# Patient Record
Sex: FEMALE | Race: BLACK OR AFRICAN AMERICAN | Employment: OTHER | URBAN - METROPOLITAN AREA
[De-identification: names, ages, dates, MRNs, and addresses within clinical notes are randomized per-mention and may not be internally consistent; named-entity substitution may affect disease eponyms.]

---

## 2017-03-29 ENCOUNTER — OFFICE VISIT (OUTPATIENT)
Dept: ENT CLINIC | Age: 42
End: 2017-03-29

## 2017-03-29 ENCOUNTER — TELEPHONE (OUTPATIENT)
Dept: ENT CLINIC | Age: 42
End: 2017-03-29

## 2017-03-29 VITALS
HEIGHT: 63 IN | SYSTOLIC BLOOD PRESSURE: 100 MMHG | BODY MASS INDEX: 23.92 KG/M2 | DIASTOLIC BLOOD PRESSURE: 60 MMHG | WEIGHT: 135 LBS | HEART RATE: 62 BPM

## 2017-03-29 DIAGNOSIS — H60.332 ACUTE SWIMMER'S EAR OF LEFT SIDE: Primary | ICD-10-CM

## 2017-03-29 PROCEDURE — 99203 OFFICE O/P NEW LOW 30 MIN: CPT | Performed by: OTOLARYNGOLOGY

## 2018-10-13 ENCOUNTER — HOSPITAL ENCOUNTER (EMERGENCY)
Age: 43
Discharge: HOME | End: 2018-10-13
Payer: COMMERCIAL

## 2018-10-13 VITALS
HEIGHT: 63 IN | RESPIRATION RATE: 14 BRPM | DIASTOLIC BLOOD PRESSURE: 67 MMHG | TEMPERATURE: 97.2 F | OXYGEN SATURATION: 100 % | SYSTOLIC BLOOD PRESSURE: 103 MMHG | WEIGHT: 150 LBS | BODY MASS INDEX: 26.58 KG/M2 | HEART RATE: 59 BPM

## 2018-10-13 DIAGNOSIS — H92.02 OTALGIA OF LEFT EAR: Primary | ICD-10-CM

## 2018-10-13 PROCEDURE — 99282 EMERGENCY DEPT VISIT SF MDM: CPT

## 2018-10-13 RX ORDER — CIPROFLOXACIN AND DEXAMETHASONE 3; 1 MG/ML; MG/ML
4 SUSPENSION/ DROPS AURICULAR (OTIC) 2 TIMES DAILY
Qty: 1 BOTTLE | Refills: 0 | Status: SHIPPED | OUTPATIENT
Start: 2018-10-13 | End: 2018-10-20

## 2018-10-13 RX ORDER — IBUPROFEN 600 MG/1
600 TABLET ORAL EVERY 6 HOURS PRN
Qty: 30 TABLET | Refills: 0 | Status: SHIPPED | OUTPATIENT
Start: 2018-10-13 | End: 2020-12-12

## 2018-10-13 ASSESSMENT — PAIN DESCRIPTION - PAIN TYPE: TYPE: ACUTE PAIN

## 2018-10-13 ASSESSMENT — ENCOUNTER SYMPTOMS
VOMITING: 0
TROUBLE SWALLOWING: 0
COUGH: 0
NAUSEA: 0
SHORTNESS OF BREATH: 0
COLOR CHANGE: 0

## 2018-10-13 ASSESSMENT — PAIN DESCRIPTION - LOCATION: LOCATION: EAR

## 2018-10-13 ASSESSMENT — PAIN DESCRIPTION - ORIENTATION: ORIENTATION: LEFT

## 2018-10-13 ASSESSMENT — PAIN SCALES - GENERAL: PAINLEVEL_OUTOF10: 7

## 2018-10-13 NOTE — ED PROVIDER NOTES
No results found. MEDICAL DECISION MAKING / ED COURSE:      PROCEDURES:   Procedures    None    Patient was given:  Medications - No data to display    Patient presented with some otalgia the left ear. There is no evidence of otitis externa, malignant otitis externa, otitis media, mastoiditis, [place], cellulitis or other emergent etiology. Patient was adamant that she needed antibiotic drops. I had a long conversation with the patient that there is currently no evidence of infectious etiology and that if we put her on antibiotics can lead to resistance. Using shared decision making this patient was extremely adamant a discussed with her that would write her for a prescription but to only take the anti-inflammatories first and only get the drops filled if the ear pain does not improve or gets worse. Patient understood. She will follow-up with her family physician return here for any worsening symptoms or problems at home. The patient tolerated their visit well. I evaluated the patient. The physician was available for consultation as needed. The patient and / or the family were informed of the results of anytests, a time was given to answer questions, a plan was proposed and they agreed with plan. CLINICAL IMPRESSION:  1.  Otalgia of left ear        DISPOSITION Decision To Discharge 10/13/2018 07:37:55 PM      PATIENT REFERRED TO:  Clem Quintanilla MD  [address]   [phone]    Schedule an appointment as soon as possible for a visit in 3 days  For re-check    [facility] Emergency Department  [address] [place] [address]  [phone]    As needed      DISCHARGE MEDICATIONS:  New Prescriptions    CIPROFLOXACIN-DEXAMETHASONE (CIPRODEX) 0.3-0.1 % OTIC SUSPENSION    Place 4 drops in ear(s) 2 times daily for 7 days    IBUPROFEN (ADVIL;MOTRIN) 600 MG TABLET    Take 1 tablet by mouth every 6 hours as needed for Pain       DISCONTINUED MEDICATIONS:  Discontinued Medications    MOMETASONE (NASONEX) 50 MCG/ACT NASAL SPRAY    2 sprays by Nasal route daily    NAPROXEN (NAPROSYN) 500 MG TABLET    Take 1 tablet by mouth 2 times daily (with meals)              (Please note the MDM and HPI sections of this note were completed with a voice recognition program.  Efforts weremade to edit the dictations but occasionally words are mis-transcribed.)    Electronically signed, Zoltan Navarro PA-C,          Zoltan Navarro PA-C  10/13/18 1948

## 2018-10-20 ENCOUNTER — HOSPITAL ENCOUNTER (EMERGENCY)
Age: 43
Discharge: HOME | End: 2018-10-20
Payer: COMMERCIAL

## 2018-10-20 ENCOUNTER — APPOINTMENT (OUTPATIENT)
Dept: GENERAL RADIOLOGY | Age: 43
End: 2018-10-20
Payer: COMMERCIAL

## 2018-10-20 VITALS
RESPIRATION RATE: 18 BRPM | SYSTOLIC BLOOD PRESSURE: 103 MMHG | TEMPERATURE: 97.9 F | HEIGHT: 63 IN | HEART RATE: 70 BPM | WEIGHT: 155 LBS | OXYGEN SATURATION: 94 % | BODY MASS INDEX: 27.46 KG/M2 | DIASTOLIC BLOOD PRESSURE: 69 MMHG

## 2018-10-20 DIAGNOSIS — T59.811A SMOKE INHALATION DUE TO CHEMICAL FUMES AND VAPORS: Primary | ICD-10-CM

## 2018-10-20 DIAGNOSIS — Z57.9 OCCUPATIONAL EXPOSURE IN WORKPLACE: ICD-10-CM

## 2018-10-20 PROCEDURE — 71046 X-RAY EXAM CHEST 2 VIEWS: CPT

## 2018-10-20 PROCEDURE — 99284 EMERGENCY DEPT VISIT MOD MDM: CPT

## 2018-10-20 RX ORDER — PREDNISONE 10 MG/1
60 TABLET ORAL DAILY
Qty: 30 TABLET | Refills: 0 | Status: SHIPPED | OUTPATIENT
Start: 2018-10-20 | End: 2018-10-25

## 2018-10-20 RX ORDER — AMOXICILLIN AND CLAVULANATE POTASSIUM 875; 125 MG/1; MG/1
1 TABLET, FILM COATED ORAL 2 TIMES DAILY
Qty: 20 TABLET | Refills: 0 | Status: SHIPPED | OUTPATIENT
Start: 2018-10-20 | End: 2018-10-30

## 2018-10-20 SDOH — HEALTH STABILITY - PHYSICAL HEALTH: OCCUPATIONAL EXPOSURE TO UNSPECIFIED RISK FACTOR: Z57.9

## 2018-10-20 ASSESSMENT — ENCOUNTER SYMPTOMS
SORE THROAT: 0
NAUSEA: 0
VOMITING: 0
RHINORRHEA: 0
CONSTIPATION: 0
ABDOMINAL PAIN: 0
DIARRHEA: 0
BLOOD IN STOOL: 0
SHORTNESS OF BREATH: 1

## 2018-10-20 ASSESSMENT — PAIN SCALES - GENERAL: PAINLEVEL_OUTOF10: 6

## 2018-10-20 ASSESSMENT — PAIN DESCRIPTION - LOCATION: LOCATION: CHEST

## 2018-10-20 ASSESSMENT — PAIN DESCRIPTION - PAIN TYPE: TYPE: ACUTE PAIN

## 2018-10-20 NOTE — ED PROVIDER NOTES
[address] [facility]  eMERGENCY dEPARTMENT eNCOUnter        Pt Name: Princess Smith  MRN: [id number]  [place] 1975  Date of evaluation: 10/20/2018  Provider: MARISOL Kirby - VESNA  PCP: Mallorie Katz MD      [address] [place] [address]       Chief Complaint   Patient presents with    Shortness of Breath     Pt. comes in today with complaints of shortness of breath that has been going on for a month. Pt. [place] fumes. Pt. would like a note for work, and a prescription for an antibiotic for bronchitis. HISTORY OF PRESENT ILLNESS   (Location/Symptom, Timing/Onset,Context/Setting, Quality, Duration, Modifying Factors, Severity)  Note limiting factors. Princess Smith is a 37 y.o. female who presents emergency Department with concern for shortness of breath. 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 She has been inhaling jet fumes for about the past month. She reports that she thinks this is making her short of breath. She would like a work note to transfer jobs. She reports that she left work today to come here and get the note. She is also requesting antibiotics for bronchitis. She is not a smoker. She denies fever, rash, headaches, dizziness, chest pain, cough, congestion, abdominal pain, nausea, vomiting, diarrhea, constipation, blood in the stool, or painful urination. No family at bedside. Nursing Notes triage note reviewed and agreed with or any disagreements were addressed  in the HPI. REVIEW OF SYSTEMS    (2-9 systems for level 4, 10 or more for level 5)     Review of Systems   Constitutional: Negative for chills and fever. HENT: Negative for postnasal drip, rhinorrhea and sore throat. Eyes: Negative for visual disturbance. Respiratory: Positive for shortness of breath. Cardiovascular: Negative for chest pain.    Gastrointestinal: Negative for abdominal pain, blood in stool, constipation, diarrhea, nausea and vomiting. Genitourinary: Negative for dysuria, flank pain and hematuria. Skin: Negative for rash. Neurological: Negative for weakness and headaches. All other systems reviewed and are negative. Positives and Pertinent negatives as per HPI. Except as noted above in the ROS, all other systems were reviewed and negative. PAST MEDICAL HISTORY     Past Medical History:   Diagnosis Date    Bartholin's cyst     Bell's palsy     Necrotizing pancreatitis          SURGICAL HISTORY       Past Surgical History:   Procedure Laterality Date    BARTHOLIN GLAND CYST EXCISION       SECTION           CURRENT MEDICATIONS       Discharge Medication List as of 10/20/2018 12:09 PM      CONTINUE these medications which have NOT CHANGED    Details   ibuprofen (ADVIL;MOTRIN) 600 MG tablet Take 1 tablet by mouth every 6 hours as needed for Pain, Disp-30 tablet, R-0Print      ciprofloxacin-dexamethasone (CIPRODEX) 0.3-0.1 % otic suspension Place 4 drops in ear(s) 2 times daily for 7 days, Disp-1 Bottle, R-0Print               ALLERGIES     Shellfish-derived products    FAMILY HISTORY     History reviewed. No pertinent family history.        SOCIAL HISTORY       Social History     Social History    Marital status: Single     Spouse name: N/A    Number of children: N/A    Years of education: N/A     Social History Main Topics    Smoking status: Former Smoker     Quit date: 2007    Smokeless tobacco: Never Used    Alcohol use No    Drug use: No    Sexual activity: No     Other Topics Concern    None     Social History Narrative    None       SCREENINGS             PHYSICAL EXAM  (up to 7 for level 4, 8 or more for level 5)     ED Triage Vitals [10/20/18 1052]   BP Temp Temp Source Pulse Resp SpO2 Height Weight   (!) 109/43 97.9 °F (36.6 °C) Oral 60 18 100 % 5' 3\" (1.6 m) 155 lb (70.3 kg)       Physical Exam   Constitutional: She is oriented to person, place, and time. She appears well-developed and well-nourished. No distress. HENT:   Head: Normocephalic and atraumatic. Eyes: Right eye exhibits no discharge. Left eye exhibits no discharge. No scleral icterus. Neck: Normal range of motion. Neck supple. Cardiovascular: Normal rate, regular rhythm, normal heart sounds and intact distal pulses. Exam reveals no gallop and no friction rub. No murmur heard. Pulmonary/Chest: Effort normal and breath sounds normal. No stridor. No respiratory distress. She has no wheezes. She has no rales. She exhibits no tenderness. Abdominal: Soft. Bowel sounds are normal. She exhibits no distension and no mass. There is no tenderness. There is no rebound and no guarding. Musculoskeletal: Normal range of motion. She exhibits no edema or tenderness. Neurological: She is alert and oriented to person, place, and time. Coordination normal.   Skin: Skin is warm and dry. She is not diaphoretic. No pallor. Psychiatric: She has a normal mood and affect. Her behavior is normal.   Nursing note and vitals reviewed. DIAGNOSTIC RESULTS   LABS:    Labs Reviewed - No data to display    All other labs werewithin normal range or not returned as of this dictation. EKG: All EKG's are interpreted by the Emergency Department Physician who either signs or Co-signs this chart in the absence of acardiologist.  Please see their note for interpretation of EKG. RADIOLOGY:   Interpretation per the Radiologist below, if available at the time of this note:    XR CHEST STANDARD (2 VW)   Final Result   No acute cardiopulmonary disease. Xr Chest Standard (2 Vw)    Result Date: 10/20/2018  EXAMINATION: TWO VIEWS OF THE CHEST 10/20/2018 11:34 am COMPARISON: Radiograph 08/23/2018 HISTORY: ORDERING SYSTEM PROVIDED HISTORY: shortness of breath TECHNOLOGIST PROVIDED HISTORY: Reason for exam:->shortness of breath Ordering Physician Provided Reason for Exam: SOB x1 month.

## 2018-11-24 ENCOUNTER — APPOINTMENT (OUTPATIENT)
Dept: CT IMAGING | Age: 43
End: 2018-11-24
Payer: COMMERCIAL

## 2018-11-24 ENCOUNTER — HOSPITAL ENCOUNTER (EMERGENCY)
Age: 43
Discharge: HOME | End: 2018-11-24
Attending: EMERGENCY MEDICINE
Payer: COMMERCIAL

## 2018-11-24 VITALS
OXYGEN SATURATION: 98 % | DIASTOLIC BLOOD PRESSURE: 52 MMHG | SYSTOLIC BLOOD PRESSURE: 100 MMHG | RESPIRATION RATE: 16 BRPM | BODY MASS INDEX: 27.46 KG/M2 | HEART RATE: 60 BPM | WEIGHT: 155 LBS | TEMPERATURE: 98.1 F | HEIGHT: 63 IN

## 2018-11-24 DIAGNOSIS — R51.9 NONINTRACTABLE HEADACHE, UNSPECIFIED CHRONICITY PATTERN, UNSPECIFIED HEADACHE TYPE: Primary | ICD-10-CM

## 2018-11-24 LAB
A/G RATIO: 1.4 (ref 1.1–2.2)
ALBUMIN SERPL-MCNC: 3.8 G/DL (ref 3.4–5)
ALP BLD-CCNC: 67 U/L (ref 40–129)
ALT SERPL-CCNC: 22 U/L (ref 10–40)
ANION GAP SERPL CALCULATED.3IONS-SCNC: 12 MMOL/L (ref 3–16)
AST SERPL-CCNC: 27 U/L (ref 15–37)
BASOPHILS ABSOLUTE: 0 K/UL (ref 0–0.2)
BASOPHILS RELATIVE PERCENT: 0.4 %
BILIRUB SERPL-MCNC: 0.4 MG/DL (ref 0–1)
BUN BLDV-MCNC: 15 MG/DL (ref 7–20)
CALCIUM SERPL-MCNC: 8.6 MG/DL (ref 8.3–10.6)
CHLORIDE BLD-SCNC: 107 MMOL/L (ref 99–110)
CO2: 22 MMOL/L (ref 21–32)
CREAT SERPL-MCNC: 0.8 MG/DL (ref 0.6–1.1)
EOSINOPHILS ABSOLUTE: 0.1 K/UL (ref 0–0.6)
EOSINOPHILS RELATIVE PERCENT: 2.8 %
GFR AFRICAN AMERICAN: >60
GFR NON-AFRICAN AMERICAN: >60
GLOBULIN: 2.7 G/DL
GLUCOSE BLD-MCNC: 112 MG/DL (ref 70–99)
HCG QUALITATIVE: NEGATIVE
HCT VFR BLD CALC: 39.9 % (ref 36–48)
HEMOGLOBIN: 12.8 G/DL (ref 12–16)
LIPASE: 40 U/L (ref 13–60)
LYMPHOCYTES ABSOLUTE: 0.7 K/UL (ref 1–5.1)
LYMPHOCYTES RELATIVE PERCENT: 14.5 %
MCH RBC QN AUTO: 27.8 PG (ref 26–34)
MCHC RBC AUTO-ENTMCNC: 32.2 G/DL (ref 31–36)
MCV RBC AUTO: 86.3 FL (ref 80–100)
MONOCYTES ABSOLUTE: 0.3 K/UL (ref 0–1.3)
MONOCYTES RELATIVE PERCENT: 6.2 %
NEUTROPHILS ABSOLUTE: 3.9 K/UL (ref 1.7–7.7)
NEUTROPHILS RELATIVE PERCENT: 76.1 %
PDW BLD-RTO: 13.8 % (ref 12.4–15.4)
PLATELET # BLD: 173 K/UL (ref 135–450)
PMV BLD AUTO: 10.2 FL (ref 5–10.5)
POTASSIUM SERPL-SCNC: 4.4 MMOL/L (ref 3.5–5.1)
RBC # BLD: 4.62 M/UL (ref 4–5.2)
SODIUM BLD-SCNC: 141 MMOL/L (ref 136–145)
TOTAL PROTEIN: 6.5 G/DL (ref 6.4–8.2)
WBC # BLD: 5.1 K/UL (ref 4–11)

## 2018-11-24 PROCEDURE — 6360000002 HC RX W HCPCS: Performed by: PHYSICIAN ASSISTANT

## 2018-11-24 PROCEDURE — 70450 CT HEAD/BRAIN W/O DYE: CPT

## 2018-11-24 PROCEDURE — 96375 TX/PRO/DX INJ NEW DRUG ADDON: CPT

## 2018-11-24 PROCEDURE — 83690 ASSAY OF LIPASE: CPT

## 2018-11-24 PROCEDURE — 99284 EMERGENCY DEPT VISIT MOD MDM: CPT

## 2018-11-24 PROCEDURE — 85025 COMPLETE CBC W/AUTO DIFF WBC: CPT

## 2018-11-24 PROCEDURE — 36415 COLL VENOUS BLD VENIPUNCTURE: CPT

## 2018-11-24 PROCEDURE — 80053 COMPREHEN METABOLIC PANEL: CPT

## 2018-11-24 PROCEDURE — 96374 THER/PROPH/DIAG INJ IV PUSH: CPT

## 2018-11-24 PROCEDURE — 96361 HYDRATE IV INFUSION ADD-ON: CPT

## 2018-11-24 PROCEDURE — 6370000000 HC RX 637 (ALT 250 FOR IP): Performed by: PHYSICIAN ASSISTANT

## 2018-11-24 PROCEDURE — 2580000003 HC RX 258: Performed by: PHYSICIAN ASSISTANT

## 2018-11-24 PROCEDURE — 84703 CHORIONIC GONADOTROPIN ASSAY: CPT

## 2018-11-24 RX ORDER — METOCLOPRAMIDE HYDROCHLORIDE 5 MG/ML
10 INJECTION INTRAMUSCULAR; INTRAVENOUS ONCE
Status: COMPLETED | OUTPATIENT
Start: 2018-11-24 | End: 2018-11-24

## 2018-11-24 RX ORDER — ACETAMINOPHEN 500 MG
1000 TABLET ORAL ONCE
Status: COMPLETED | OUTPATIENT
Start: 2018-11-24 | End: 2018-11-24

## 2018-11-24 RX ORDER — DIPHENHYDRAMINE HYDROCHLORIDE 50 MG/ML
12.5 INJECTION INTRAMUSCULAR; INTRAVENOUS ONCE
Status: COMPLETED | OUTPATIENT
Start: 2018-11-24 | End: 2018-11-24

## 2018-11-24 RX ORDER — NAPROXEN 500 MG/1
500 TABLET ORAL 2 TIMES DAILY WITH MEALS
COMMUNITY
End: 2020-12-12

## 2018-11-24 RX ORDER — 0.9 % SODIUM CHLORIDE 0.9 %
1000 INTRAVENOUS SOLUTION INTRAVENOUS ONCE
Status: COMPLETED | OUTPATIENT
Start: 2018-11-24 | End: 2018-11-24

## 2018-11-24 RX ADMIN — SODIUM CHLORIDE 1000 ML: 9 INJECTION, SOLUTION INTRAVENOUS at 07:29

## 2018-11-24 RX ADMIN — METOCLOPRAMIDE 10 MG: 5 INJECTION, SOLUTION INTRAMUSCULAR; INTRAVENOUS at 07:30

## 2018-11-24 RX ADMIN — DIPHENHYDRAMINE HYDROCHLORIDE 12.5 MG: 50 INJECTION, SOLUTION INTRAMUSCULAR; INTRAVENOUS at 07:30

## 2018-11-24 RX ADMIN — ACETAMINOPHEN 1000 MG: 500 TABLET, FILM COATED ORAL at 07:29

## 2018-11-24 ASSESSMENT — ENCOUNTER SYMPTOMS
COUGH: 0
VOMITING: 0
NAUSEA: 0
SHORTNESS OF BREATH: 0
RHINORRHEA: 0
DIARRHEA: 0
ABDOMINAL PAIN: 0

## 2018-11-24 ASSESSMENT — PAIN SCALES - GENERAL
PAINLEVEL_OUTOF10: 7
PAINLEVEL_OUTOF10: 7

## 2018-11-24 NOTE — ED PROVIDER NOTES
[address] [facility]  eMERGENCY dEPARTMENT eNCOUnter        Pt Name: Margarita Monahan  MRN: [id number]  [place] 1975  Date of evaluation: 11/24/2018  Provider: Ramon Rosales PA-C  PCP: Talib Turcios MD    This patient was seen and evaluated by the attending physician Joseph Moore MD.      [address] [place] [address]       Chief Complaint   Patient presents with    Headache     headache since this morning. took 5 aleve and it helped some , also c/o facial tingling in L side and weakness of L extremities. denies numbness. HISTORY OF PRESENT ILLNESS   (Location/Symptom, Timing/Onset, Context/Setting, Quality, Duration, Modifying Factors, Severity)  Note limiting factors. Margarita Monahan is a 37 y.o. female who presents to the emergency department today for evaluation for a headache. The patient states that she woke up today she states that she is getting ready for work and noticed a gradual onset of headache to the left side of her head. This began around 5:30 AM this morning. The patient states that she has a \"weakness\" to the left side of her body as well as a \"tingling\" sensation to both sides of her face. Patient states that she did take 5 leave before the ED and her headache is improving she is currently rating her pain as a 7/10. This is not the worse headache of her life. She denies any fall or head injury. No visual changes. She denies any numbness or tingling. She has no other complaints at this time. She denies any fever or chills. No cough or congestion. She denies any abdominal pain, nausea, vomiting or diarrhea. No urinary symptoms. Nursing Notes were all reviewed and agreed with or any disagreements were addressed  in the HPI. REVIEW OF SYSTEMS    (2-9 systems for level 4, 10 or more for level 5)     Review of Systems   Constitutional: Negative for activity change, appetite change, chills and fever.    HENT: Negative for congestion and 155 lb (70.3 kg)       Physical Exam   Constitutional: She is oriented to person, place, and time. She appears well-developed and well-nourished. HENT:   Head: Normocephalic and atraumatic. Right Ear: External ear normal.   Left Ear: External ear normal.   Nose: Nose normal.   Mouth/Throat: Oropharynx is clear and moist. No oropharyngeal exudate. Eyes: Pupils are equal, round, and reactive to light. Conjunctivae and EOM are normal. Right eye exhibits no discharge. Left eye exhibits no discharge. Neck: Normal range of motion. Neck supple. No tracheal deviation present. Cardiovascular: Normal rate, regular rhythm and normal heart sounds. No murmur heard. Pulmonary/Chest: Effort normal and breath sounds normal. No respiratory distress. She has no wheezes. She has no rales. Abdominal: Soft. Bowel sounds are normal. She exhibits no distension and no mass. There is no tenderness. There is no rebound and no guarding. Musculoskeletal: Normal range of motion. Neurological: She is alert and oriented to person, place, and time. She has normal strength. No cranial nerve deficit or sensory deficit. Coordination and gait normal. GCS eye subscore is 4. GCS verbal subscore is 5. GCS motor subscore is 6. Motor strength is 5/5 throughout. Normal sensation light touch. Cranial nerves II through XII are grossly intact. No ataxia with finger to nose or heel to shin. No pronator drift. No leg drift   Skin: Skin is warm and dry. She is not diaphoretic. Psychiatric: She has a normal mood and affect. Her behavior is normal.   Nursing note and vitals reviewed.       DIAGNOSTIC RESULTS   LABS:    Labs Reviewed   CBC WITH AUTO DIFFERENTIAL - Abnormal; Notable for the following:        Result Value    Lymphocytes # 0.7 (*)     All other components within normal limits    Narrative:     Performed at:  [facility]-[address], [facility] [address]   Phone [phone] (BENADRYL) injection 12.5 mg (12.5 mg Intravenous Given 11/24/18 3697)   0.9 % sodium chloride bolus (0 mLs Intravenous Stopped 11/24/18 0840)     The patient presents to the emergency department today for evaluation for a headache. The patient states that she woke up today she states that she is getting ready for work and noticed a gradual onset of headache to the left side of her head. This began around 5:30 AM this morning. The patient states that she has a \"weakness\" to the left side of her body as well as a \"tingling\" sensation to both sides of her face. Patient states that she did take 5 leave before the ED and her headache is improving she is currently rating her pain as a 7/10. This is not the worse headache of her life. She denies any fall or head injury. No visual changes. She denies any numbness or tingling. She has no other complaints at this time. She denies any fever or chills. No cough or congestion. She denies any abdominal pain, nausea, vomiting or diarrhea. No urinary symptoms. Physical exam is unremarkable, there are no focal neurological deficits. CT of head is negative. Patient was given Tylenol, Reglan, Benadryl and fluids in the ED. Upon reevaluation she is overall feeling much better. As patient has an NIH scale of 0, workup in ED is negative, she is overall feeling better after medications do not feel that she needs a LP, or further workup as my suspicion is low at this time for acute subarachnoid hemorrhage, acute intracranial hemorrhage, skull fracture, epidural hematoma, subdural hematoma, CVA, TIA, posterior etiology or other emergent etiology. She is to follow-up with her primary care physician in 2-3 days reevaluation. She is to discuss a possible referral to neurology at that time. She is to return to the ED for any new or worsening symptoms. Patient was understanding is agreeable plan. Stable for discharge        FINAL IMPRESSION      1.  Nonintractable

## 2018-11-24 NOTE — LETTER
[facility] Emergency Department  [address] [facility] [address]  Phone: [phone]               November 24, 2018    Patient: Devika Hou   YOB: 1975   Date of Visit: 11/24/2018       To Whom It May Concern:    Hector Turner was seen and treated in our emergency department on 11/24/2018. She may return to work on 11/26/2018.       Sincerely,       Yemi Noyola RN         Signature:__________________________________

## 2019-01-05 ENCOUNTER — HOSPITAL ENCOUNTER (EMERGENCY)
Age: 44
Discharge: LEFT BEFORE DISCHARGE | End: 2019-01-05
Payer: COMMERCIAL

## 2019-01-05 PROCEDURE — 4500000002 HC ER NO CHARGE

## 2019-03-09 ENCOUNTER — APPOINTMENT (OUTPATIENT)
Dept: CT IMAGING | Age: 44
End: 2019-03-09
Payer: COMMERCIAL

## 2019-03-09 ENCOUNTER — HOSPITAL ENCOUNTER (EMERGENCY)
Age: 44
Discharge: HOME | End: 2019-03-09
Attending: EMERGENCY MEDICINE
Payer: COMMERCIAL

## 2019-03-09 VITALS
HEART RATE: 66 BPM | TEMPERATURE: 97.3 F | SYSTOLIC BLOOD PRESSURE: 108 MMHG | OXYGEN SATURATION: 100 % | DIASTOLIC BLOOD PRESSURE: 65 MMHG | RESPIRATION RATE: 16 BRPM

## 2019-03-09 DIAGNOSIS — K59.00 CONSTIPATION, UNSPECIFIED CONSTIPATION TYPE: ICD-10-CM

## 2019-03-09 DIAGNOSIS — R10.2 SUPRAPUBIC ABDOMINAL PAIN: Primary | ICD-10-CM

## 2019-03-09 LAB
BACTERIA WET PREP: NORMAL
BACTERIA: ABNORMAL /HPF
BILIRUBIN URINE: NEGATIVE
BLOOD, URINE: NEGATIVE
CLARITY: CLEAR
CLUE CELLS: NORMAL
COLOR: YELLOW
EPITHELIAL CELLS WET PREP: NORMAL
EPITHELIAL CELLS, UA: ABNORMAL /HPF
GLUCOSE URINE: NEGATIVE MG/DL
HCG(URINE) PREGNANCY TEST: NEGATIVE
KETONES, URINE: NEGATIVE MG/DL
LEUKOCYTE ESTERASE, URINE: ABNORMAL
MICROSCOPIC EXAMINATION: YES
NITRITE, URINE: NEGATIVE
PH UA: 6 (ref 5–8)
PROTEIN UA: NEGATIVE MG/DL
RBC UA: ABNORMAL /HPF (ref 0–2)
RBC WET PREP: NORMAL
REASON FOR REJECTION: NORMAL
REJECTED TEST: NORMAL
SOURCE WET PREP: NORMAL
SPECIFIC GRAVITY UA: 1 (ref 1–1.03)
TRICHOMONAS PREP: NORMAL
URINE REFLEX TO CULTURE: YES
URINE TYPE: ABNORMAL
UROBILINOGEN, URINE: 0.2 E.U./DL
WBC UA: ABNORMAL /HPF (ref 0–5)
WBC WET PREP: NORMAL
YEAST WET PREP: NORMAL

## 2019-03-09 PROCEDURE — 87210 SMEAR WET MOUNT SALINE/INK: CPT

## 2019-03-09 PROCEDURE — 87086 URINE CULTURE/COLONY COUNT: CPT

## 2019-03-09 PROCEDURE — 81001 URINALYSIS AUTO W/SCOPE: CPT

## 2019-03-09 PROCEDURE — 99284 EMERGENCY DEPT VISIT MOD MDM: CPT

## 2019-03-09 PROCEDURE — 87491 CHLMYD TRACH DNA AMP PROBE: CPT

## 2019-03-09 PROCEDURE — 84703 CHORIONIC GONADOTROPIN ASSAY: CPT

## 2019-03-09 PROCEDURE — 74176 CT ABD & PELVIS W/O CONTRAST: CPT

## 2019-03-09 PROCEDURE — 87591 N.GONORRHOEAE DNA AMP PROB: CPT

## 2019-03-09 RX ORDER — IBUPROFEN 200 MG
600 TABLET ORAL EVERY 8 HOURS PRN
Qty: 60 TABLET | Refills: 0 | OUTPATIENT
Start: 2019-03-09 | End: 2020-12-12

## 2019-03-09 RX ORDER — POLYETHYLENE GLYCOL 3350 17 G/17G
17 POWDER, FOR SOLUTION ORAL 2 TIMES DAILY PRN
Qty: 510 G | Refills: 0 | Status: SHIPPED | OUTPATIENT
Start: 2019-03-09

## 2019-03-09 ASSESSMENT — ENCOUNTER SYMPTOMS
ALLERGIC/IMMUNOLOGIC NEGATIVE: 1
COUGH: 0
NAUSEA: 0
DIARRHEA: 0
BACK PAIN: 0
VOMITING: 0
CONSTIPATION: 0
WHEEZING: 0
SHORTNESS OF BREATH: 0
STRIDOR: 0
COLOR CHANGE: 0
ABDOMINAL PAIN: 1
ABDOMINAL DISTENTION: 0

## 2019-03-09 ASSESSMENT — PAIN SCALES - GENERAL: PAINLEVEL_OUTOF10: 7

## 2019-03-11 LAB
C TRACH DNA GENITAL QL NAA+PROBE: NEGATIVE
N. GONORRHOEAE DNA: NEGATIVE
URINE CULTURE, ROUTINE: NORMAL

## 2019-05-11 ENCOUNTER — HOSPITAL ENCOUNTER (EMERGENCY)
Age: 44
Discharge: HOME | End: 2019-05-11
Payer: COMMERCIAL

## 2019-05-11 ENCOUNTER — APPOINTMENT (OUTPATIENT)
Dept: GENERAL RADIOLOGY | Age: 44
End: 2019-05-11
Payer: COMMERCIAL

## 2019-05-11 VITALS
HEIGHT: 63 IN | TEMPERATURE: 98.2 F | HEART RATE: 55 BPM | DIASTOLIC BLOOD PRESSURE: 60 MMHG | SYSTOLIC BLOOD PRESSURE: 95 MMHG | OXYGEN SATURATION: 100 % | RESPIRATION RATE: 16 BRPM | WEIGHT: 150 LBS | BODY MASS INDEX: 26.58 KG/M2

## 2019-05-11 DIAGNOSIS — J20.9 ACUTE BRONCHITIS, UNSPECIFIED ORGANISM: Primary | ICD-10-CM

## 2019-05-11 PROCEDURE — 6370000000 HC RX 637 (ALT 250 FOR IP): Performed by: PHYSICIAN ASSISTANT

## 2019-05-11 PROCEDURE — 94640 AIRWAY INHALATION TREATMENT: CPT

## 2019-05-11 PROCEDURE — 99283 EMERGENCY DEPT VISIT LOW MDM: CPT

## 2019-05-11 PROCEDURE — 71046 X-RAY EXAM CHEST 2 VIEWS: CPT

## 2019-05-11 RX ORDER — IPRATROPIUM BROMIDE AND ALBUTEROL SULFATE 2.5; .5 MG/3ML; MG/3ML
1 SOLUTION RESPIRATORY (INHALATION) ONCE
Status: COMPLETED | OUTPATIENT
Start: 2019-05-11 | End: 2019-05-11

## 2019-05-11 RX ORDER — ALBUTEROL SULFATE 90 UG/1
2 AEROSOL, METERED RESPIRATORY (INHALATION) EVERY 4 HOURS PRN
Qty: 1 INHALER | Refills: 0 | Status: SHIPPED | OUTPATIENT
Start: 2019-05-11 | End: 2021-08-11

## 2019-05-11 RX ORDER — PREDNISONE 20 MG/1
60 TABLET ORAL ONCE
Status: COMPLETED | OUTPATIENT
Start: 2019-05-11 | End: 2019-05-11

## 2019-05-11 RX ORDER — PREDNISONE 10 MG/1
TABLET ORAL
Qty: 30 TABLET | Refills: 0 | Status: SHIPPED | OUTPATIENT
Start: 2019-05-11 | End: 2019-05-21

## 2019-05-11 RX ADMIN — PREDNISONE 60 MG: 20 TABLET ORAL at 11:27

## 2019-05-11 RX ADMIN — IPRATROPIUM BROMIDE AND ALBUTEROL SULFATE 1 AMPULE: .5; 3 SOLUTION RESPIRATORY (INHALATION) at 11:29

## 2019-05-11 ASSESSMENT — PAIN SCALES - GENERAL: PAINLEVEL_OUTOF10: 9

## 2019-05-11 ASSESSMENT — ENCOUNTER SYMPTOMS
BACK PAIN: 0
SORE THROAT: 0
CHEST TIGHTNESS: 0
DIARRHEA: 0
SHORTNESS OF BREATH: 1
COLOR CHANGE: 0
VOMITING: 0
NAUSEA: 0
ABDOMINAL PAIN: 0
COUGH: 1

## 2019-05-11 ASSESSMENT — PAIN DESCRIPTION - LOCATION: LOCATION: GENERALIZED

## 2019-05-11 NOTE — ED PROVIDER NOTES
**EVALUATED BY ADVANCED PRACTICE PROVIDER**        [address] [facility]      Pt Name: Atul Savage  HSD:[id number]  [place] 1975  Date of evaluation: 5/11/2019  Provider: Lian Blank PA-C      Chief Complaint:    Chief Complaint   Patient presents with    Cough     Patient c/o productive cough, chills, fatigue. States she feels very weak. Nursing Notes, Past Medical Hx, Past Surgical Hx, Social Hx, Allergies, and Family Hx were all reviewed and agreed with or any disagreements were addressed in the HPI.    HPI:  (Location, Duration, Timing, Severity,Quality, Assoc Sx, Context, Modifying factors)  This is a  40 y.o. female who presents the emergency department with reports of cough, fever and chills as well as some mild weakness is been ongoing for approximately 2 weeks. Patient states she's had increasing symptoms related to cough. She goes on to report that she feels some tray shortness of breath especially when she is coughing. She states that she is not a smoker and she thinks that she has bronchitis. She goes on to report she has not had a documented fever that she is aware of but she does feel chilled. She has some associated symptoms of mild fatigue and malaise. She reports that she did get an influenza sedation this year. She states she's not currently experiencing substernal chest pain or palpitations. She states she has not had difficulty such as this in the past.  Because of the above-mentioned she presents for evaluation and treatment. Patient states that she has no additional complaints voiced the present time. Upon further questioning the patient denies a history of unilateral leg pain or swelling. She denies a history of DVT and her PE. She has no additional thromboembolic risk factors.   When asked the patient states that she normally has a blood pressure on the lower end of normal.  This is confirmed in reviewing previous emergency Department visit vital signs as recently as a month ago. PastMedical/Surgical History:      Diagnosis Date    Bartholin's cyst     Bell's palsy     Necrotizing pancreatitis          Procedure Laterality Date    BARTHOLIN GLAND CYST EXCISION       SECTION         Medications:  Previous Medications    IBUPROFEN (ADVIL) 200 MG TABLET    Take 3 tablets by mouth every 8 hours as needed for Pain    IBUPROFEN (ADVIL;MOTRIN) 600 MG TABLET    Take 1 tablet by mouth every 6 hours as needed for Pain    NAPROXEN (NAPROSYN) 500 MG TABLET    Take 500 mg by mouth 2 times daily (with meals)    POLYETHYLENE GLYCOL (MIRALAX) POWDER    Take 17 g by mouth 2 times daily as needed (constipation)       Review of Systems:  Review of Systems   Constitutional: Positive for chills and fatigue. Negative for activity change and fever. HENT: Negative for ear pain and sore throat. Respiratory: Positive for cough and shortness of breath. Negative for chest tightness. Cardiovascular: Negative for chest pain, palpitations and leg swelling. Gastrointestinal: Negative for abdominal pain, diarrhea, nausea and vomiting. Genitourinary: Negative for dysuria and flank pain. Musculoskeletal: Negative for back pain and myalgias. Skin: Negative for color change and wound. Neurological: Negative for seizures, syncope and headaches. Positives and Pertinent negatives as per HPI. Except as noted above in the ROS, problem specific ROS was completed and is negative. Physical Exam:  Physical Exam   Constitutional: She is oriented to person, place, and time. She appears well-developed and well-nourished. No distress. HENT:   Head: Normocephalic and atraumatic. Right Ear: External ear normal.   Left Ear: External ear normal.   Eyes: Conjunctivae are normal. Right eye exhibits no discharge. Left eye exhibits no discharge. No scleral icterus. Neck: Normal range of motion. No JVD present. Cardiovascular: Normal rate and regular rhythm. Exam reveals no gallop and no friction rub. No murmur heard. Pulmonary/Chest: Effort normal. No accessory muscle usage. No respiratory distress. She has wheezes. She has no rhonchi. She has no rales. Abdominal: Soft. Normal appearance. She exhibits no distension and no mass. There is no tenderness. There is no rigidity, no rebound, no guarding and no CVA tenderness. Neurological: She is alert and oriented to person, place, and time. She has normal strength. No cranial nerve deficit or sensory deficit. Coordination normal. GCS eye subscore is 4. GCS verbal subscore is 5. GCS motor subscore is 6. Skin: Skin is warm and dry. She is not diaphoretic. Psychiatric: She has a normal mood and affect. Her behavior is normal.   Nursing note and vitals reviewed. MEDICAL DECISION MAKING    Vitals:    Vitals:    05/11/19 1111 05/11/19 1130   BP: 95/60    Pulse: 55    Resp: 16 16   Temp: 98.2 °F (36.8 °C)    SpO2: 100% 100%   Weight: 150 lb (68 kg)    Height: 5' 3\" (1.6 m)        LABS:Labs Reviewed - No data to display     Remainder of labs reviewed and werenegative at this time or not returned at the time of this note. RADIOLOGY:   Non-plain film images such as CT, Ultrasound and MRI are read by the radiologist. Melly Bledsoe PA-C have directly visualized the radiologic plain film image(s) with the below findings:        Interpretation per the Radiologist below, if available at the time of thisnote:    XR CHEST STANDARD (2 VW)   Final Result   Negative chest.              MEDICAL DECISION MAKING / ED COURSE:      PROCEDURES:   Procedures  None    Patient was given:  Medications   predniSONE (DELTASONE) tablet 60 mg (60 mg Oral Given 5/11/19 1127)   ipratropium-albuterol (DUONEB) nebulizer solution 1 ampule (1 ampule Inhalation Given 5/11/19 1129)       The patient's detailed history of present illness is documented as above.  Upon arrival to the emergency department the patient's vital signs are as documented. The patient is noted to be hemodynamically stable and afebrile. Physical examination findings are as above. Patient was offered a big gulp. The emergency department because her blood pressure being on the lower ends of normal and since she states that is the case she refused it. She was given a DuoNeb breathing treatment and this dramatically improved her symptoms of both cough as well as her sensation of feeling somewhat short of breath. She was also given her initial dose of prednisone. Chest x-ray shows no evidence of acute cardiopulmonary process specifically no infiltrate. I discussed the treatment of her bronchitis on an outpatient basis and follow-up with Jose Posada her primary care physician. The patient has been made aware of the signs and symptoms which would necessitate an immediate return to the emergency department and verbalizes an understanding of these signs and symptoms. There is no current evidence to suggest sepsis, meningitis, epiglottitis, pneumonia, acute bacterial sinusitis, streptococcal pharyngitis, otitis media, or other bacterial infection that would be managed with antibiotics. The patient is tolerating by mouth without difficulty and is in evidence of acute distress on examination. Breath sounds are clear to ascultation. Vital signs are unremarkable for significant abnormality. Supportive care recommended at this time and the patient can be managed on an outpatient basis with follow-up with their primary care provider. Strict return precautions given for changing or worsening pain, trouble swallowing or breating, neck stiffness, fever, or rash. The patient tolerated their visit well. I evaluated the patient. The physician was available for consultation as needed.   The patient and / or the family were informed of the results of anytests, a time was given to answer questions, a plan was proposed and they agreed with plan.      CLINICAL IMPRESSION:  1. Acute bronchitis, unspecified organism        DISPOSITION Decision To Discharge 05/11/2019 12:16:05 PM      PATIENT REFERRED TO:  Josiane Blankenship MD  [address]  [address] [place] [address]  Glenna Choudhury  [phone]    Schedule an appointment as soon as possible for a visit   As needed    [facility] Emergency Department  [address] [facility]  [phone]    If symptoms worsen      DISCHARGE MEDICATIONS:  New Prescriptions    ALBUTEROL SULFATE HFA (PROVENTIL HFA) 108 (90 BASE) MCG/ACT INHALER    Inhale 2 puffs into the lungs every 4 hours as needed for Wheezing or Shortness of Breath With spacer (and mask if indicated). Thanks.     PREDNISONE (DELTASONE) 10 MG TABLET    5 tabs po qam for 2 days then 4,3,2,1 tabs qam for 2 days each total of 10 days       DISCONTINUED MEDICATIONS:  Discontinued Medications    No medications on file            (Please note the MDM and HPI sections of this note were completed with a voice recognition program.  Efforts weremade to edit the dictations but occasionally words are mis-transcribed.)    Electronically signed, Renato Nicholson PA-C,          Mark Castano PA-C  05/11/19 1567

## 2019-05-11 NOTE — ED NOTES
Pt discharged in stable condition, VSS, no signs of distress. Discharge instructions and meds reviewed. Pt verbalizes understanding and states no further questions or concerns unaddressed.        Jyotsna Prabhakar RN  05/11/19 1167

## 2020-07-24 ENCOUNTER — HOSPITAL ENCOUNTER (EMERGENCY)
Age: 45
Discharge: HOME | End: 2020-07-24
Attending: EMERGENCY MEDICINE
Payer: COMMERCIAL

## 2020-07-24 ENCOUNTER — APPOINTMENT (OUTPATIENT)
Dept: GENERAL RADIOLOGY | Age: 45
End: 2020-07-24
Payer: COMMERCIAL

## 2020-07-24 VITALS
WEIGHT: 153 LBS | TEMPERATURE: 98.8 F | BODY MASS INDEX: 27.11 KG/M2 | DIASTOLIC BLOOD PRESSURE: 63 MMHG | HEART RATE: 59 BPM | RESPIRATION RATE: 13 BRPM | OXYGEN SATURATION: 98 % | HEIGHT: 63 IN | SYSTOLIC BLOOD PRESSURE: 111 MMHG

## 2020-07-24 LAB
A/G RATIO: 1.5 (ref 1.1–2.2)
ALBUMIN SERPL-MCNC: 4.2 G/DL (ref 3.4–5)
ALP BLD-CCNC: 60 U/L (ref 40–129)
ALT SERPL-CCNC: 20 U/L (ref 10–40)
ANION GAP SERPL CALCULATED.3IONS-SCNC: 8 MMOL/L (ref 3–16)
AST SERPL-CCNC: 32 U/L (ref 15–37)
BASOPHILS ABSOLUTE: 0 K/UL (ref 0–0.2)
BASOPHILS RELATIVE PERCENT: 0.4 %
BILIRUB SERPL-MCNC: 0.4 MG/DL (ref 0–1)
BILIRUBIN URINE: NEGATIVE
BLOOD, URINE: NEGATIVE
BUN BLDV-MCNC: 15 MG/DL (ref 7–20)
CALCIUM SERPL-MCNC: 8.9 MG/DL (ref 8.3–10.6)
CHLORIDE BLD-SCNC: 106 MMOL/L (ref 99–110)
CHLORIDE URINE RANDOM: 32 MMOL/L
CLARITY: CLEAR
CO2: 25 MMOL/L (ref 21–32)
COLOR: YELLOW
CREAT SERPL-MCNC: 0.9 MG/DL (ref 0.6–1.1)
EKG ATRIAL RATE: 53 BPM
EKG DIAGNOSIS: NORMAL
EKG P AXIS: 30 DEGREES
EKG P-R INTERVAL: 162 MS
EKG Q-T INTERVAL: 432 MS
EKG QRS DURATION: 72 MS
EKG QTC CALCULATION (BAZETT): 405 MS
EKG R AXIS: -25 DEGREES
EKG T AXIS: 11 DEGREES
EKG VENTRICULAR RATE: 53 BPM
EOSINOPHILS ABSOLUTE: 0.2 K/UL (ref 0–0.6)
EOSINOPHILS RELATIVE PERCENT: 2.6 %
GFR AFRICAN AMERICAN: >60
GFR NON-AFRICAN AMERICAN: >60
GLOBULIN: 2.8 G/DL
GLUCOSE BLD-MCNC: 98 MG/DL (ref 70–99)
GLUCOSE URINE: NEGATIVE MG/DL
HCT VFR BLD CALC: 42 % (ref 36–48)
HEMOGLOBIN: 13.7 G/DL (ref 12–16)
KETONES, URINE: NEGATIVE MG/DL
LEUKOCYTE ESTERASE, URINE: NEGATIVE
LIPASE: 64 U/L (ref 13–60)
LYMPHOCYTES ABSOLUTE: 1.1 K/UL (ref 1–5.1)
LYMPHOCYTES RELATIVE PERCENT: 19 %
MCH RBC QN AUTO: 28 PG (ref 26–34)
MCHC RBC AUTO-ENTMCNC: 32.7 G/DL (ref 31–36)
MCV RBC AUTO: 85.6 FL (ref 80–100)
MICROSCOPIC EXAMINATION: NORMAL
MONOCYTES ABSOLUTE: 0.4 K/UL (ref 0–1.3)
MONOCYTES RELATIVE PERCENT: 7 %
NEUTROPHILS ABSOLUTE: 4.1 K/UL (ref 1.7–7.7)
NEUTROPHILS RELATIVE PERCENT: 71 %
NITRITE, URINE: NEGATIVE
PDW BLD-RTO: 14.9 % (ref 12.4–15.4)
PH UA: 6.5 (ref 5–8)
PLATELET # BLD: 187 K/UL (ref 135–450)
PMV BLD AUTO: 9.3 FL (ref 5–10.5)
POTASSIUM REFLEX MAGNESIUM: 5.5 MMOL/L (ref 3.5–5.1)
POTASSIUM, UR: 23.7 MMOL/L
PROTEIN UA: NEGATIVE MG/DL
RBC # BLD: 4.9 M/UL (ref 4–5.2)
SARS-COV-2, PCR: NOT DETECTED
SODIUM BLD-SCNC: 139 MMOL/L (ref 136–145)
SODIUM URINE: 22 MMOL/L
SPECIFIC GRAVITY UA: 1 (ref 1–1.03)
TOTAL CK: 208 U/L (ref 26–192)
TOTAL PROTEIN: 7 G/DL (ref 6.4–8.2)
TROPONIN: <0.01 NG/ML
URINE TYPE: NORMAL
UROBILINOGEN, URINE: 0.2 E.U./DL
WBC # BLD: 5.8 K/UL (ref 4–11)

## 2020-07-24 PROCEDURE — 84133 ASSAY OF URINE POTASSIUM: CPT

## 2020-07-24 PROCEDURE — 83690 ASSAY OF LIPASE: CPT

## 2020-07-24 PROCEDURE — 93010 ELECTROCARDIOGRAM REPORT: CPT | Performed by: INTERNAL MEDICINE

## 2020-07-24 PROCEDURE — 93005 ELECTROCARDIOGRAM TRACING: CPT | Performed by: EMERGENCY MEDICINE

## 2020-07-24 PROCEDURE — U0003 INFECTIOUS AGENT DETECTION BY NUCLEIC ACID (DNA OR RNA); SEVERE ACUTE RESPIRATORY SYNDROME CORONAVIRUS 2 (SARS-COV-2) (CORONAVIRUS DISEASE [COVID-19]), AMPLIFIED PROBE TECHNIQUE, MAKING USE OF HIGH THROUGHPUT TECHNOLOGIES AS DESCRIBED BY CMS-2020-01-R: HCPCS

## 2020-07-24 PROCEDURE — 82436 ASSAY OF URINE CHLORIDE: CPT

## 2020-07-24 PROCEDURE — 84484 ASSAY OF TROPONIN QUANT: CPT

## 2020-07-24 PROCEDURE — 6370000000 HC RX 637 (ALT 250 FOR IP): Performed by: EMERGENCY MEDICINE

## 2020-07-24 PROCEDURE — 81003 URINALYSIS AUTO W/O SCOPE: CPT

## 2020-07-24 PROCEDURE — 71046 X-RAY EXAM CHEST 2 VIEWS: CPT

## 2020-07-24 PROCEDURE — 84300 ASSAY OF URINE SODIUM: CPT

## 2020-07-24 PROCEDURE — 82550 ASSAY OF CK (CPK): CPT

## 2020-07-24 PROCEDURE — 85025 COMPLETE CBC W/AUTO DIFF WBC: CPT

## 2020-07-24 PROCEDURE — 80053 COMPREHEN METABOLIC PANEL: CPT

## 2020-07-24 PROCEDURE — 99284 EMERGENCY DEPT VISIT MOD MDM: CPT

## 2020-07-24 RX ORDER — SODIUM POLYSTYRENE SULFONATE 15 G/60ML
15 SUSPENSION ORAL; RECTAL DAILY
Qty: 180 ML | Refills: 0 | Status: SHIPPED | OUTPATIENT
Start: 2020-07-24 | End: 2020-07-27

## 2020-07-24 RX ORDER — MECLIZINE HYDROCHLORIDE 25 MG/1
25 TABLET ORAL 3 TIMES DAILY PRN
Qty: 15 TABLET | Refills: 0 | Status: SHIPPED | OUTPATIENT
Start: 2020-07-24 | End: 2020-08-03

## 2020-07-24 RX ORDER — MECLIZINE HCL 12.5 MG/1
12.5 TABLET ORAL ONCE
Status: COMPLETED | OUTPATIENT
Start: 2020-07-24 | End: 2020-07-24

## 2020-07-24 RX ADMIN — MECLIZINE 12.5 MG: 12.5 TABLET ORAL at 09:51

## 2020-07-24 NOTE — ED NOTES
Patient in [facility] again, being disruptive to staff. Telling us that she is not happy with her care. Patient redirected and educated on need to remain in room due to risk for infection.       Mary Mao RN  07/24/20 8593

## 2020-07-24 NOTE — ED NOTES
Pt d/c instructions given, v/u. New Scripts for home discussed, pt v/u. Denies needs at this time. A&O with no signs of distress. Pt ambulated to exit.        Galileo Strong RN  07/24/20 7840

## 2020-07-24 NOTE — ED NOTES
This RN entered room, preformed hand hygiene and introduced patient. Patient was sitting in chair, this RN asked if patient was able to get into the bed for IV placement, the patient refused to move to the bed and insisted in getting gum out of her purse. Patient refusing to leave monitor on and continues to tell this RN, \"you are too young, you are not respecting me, you do not have to use a tourniquet to receive blood, you are not treating me with care\" at this point patient is yelling at this RN degrading me and tell me I am not appropriate for the job due to my age and asking if it is okay to preform procedures prior to completing them. Patient told this RN that the blood being drawn was going to make her ugly and that this RN was being too rough on her delicate skin. This RN feels verbally attacked and spoke with management about a nurse that may be a better fit for the patient. The patient said to this RN, Daniel iKng, you disgraceful bitch\". This RN left room and is attempting to find another staff member to cover her care. Blood work and urine was sent for this patient.       Lola Thomas RN  07/24/20 1675

## 2020-07-24 NOTE — ED PROVIDER NOTES
[address] [facility] PROVIDER NOTE    Patient Identification  Pt Name: Margaret Rubi  MRN: [id number]  [place] 1975  Date of evaluation: 7/24/2020  Provider: Lamont Renteria MD  PCP: Les Torres MD    Chief Complaint  Urinary Tract Infection (Pt reporting UTI and started on abx, but states the symptoms haven't improved. Pt also reporting dizziness when she moves certain ways.)      HPI  (History provided by patient)  This is a 39 y.o. female who was brought in by self for intermittent dizziness with certain positions that she has had for the last few weeks. Patient also reports intermittent mild shortness of breath without associated chest pain. She did have an episode of chest pain 2 days ago, but it has not returned since. Patient denies associated numbness, tingling, focal weakness, visual changes, speech deficits, and headache. She is not having vomiting, but she has felt nauseous. She denies diarrhea and constipation. She does not have abdominal pain. Patient thinks she may have a urinary tract infection. She states she has had urinary frequency, but denies dysuria, hematuria, and urinary hesitancy. She is not having vaginal discharge or bleeding. She denies possible STD exposure as she states she has not had sexual contact and years. She has not had fever or chills and cannot recall any exposure to someone with COVID. However, she is afraid she may have it given her unusual persistent symptoms. Patient reports recent installation of water softener. She also reports increased exercise and physical activity recently, correlated with the onset of symptoms. ROS  10 systems reviewed, pertinent positives/negatives per HPI otherwise noted to be negative.     I have reviewed the following nursing documentation:  Allergies: Shellfish-derived products    Past medical history:   Past Medical History:   Diagnosis Date    Bartholin's cyst     Bell's palsy     Necrotizing pancreatitis      Past surgical history:   Past Surgical History:   Procedure Laterality Date    BARTHOLIN GLAND CYST EXCISION       SECTION         Home medications:   Previous Medications    ALBUTEROL SULFATE HFA (PROVENTIL HFA) 108 (90 BASE) MCG/ACT INHALER    Inhale 2 puffs into the lungs every 4 hours as needed for Wheezing or Shortness of Breath With spacer (and mask if indicated). Thanks. IBUPROFEN (ADVIL) 200 MG TABLET    Take 3 tablets by mouth every 8 hours as needed for Pain    IBUPROFEN (ADVIL;MOTRIN) 600 MG TABLET    Take 1 tablet by mouth every 6 hours as needed for Pain    NAPROXEN (NAPROSYN) 500 MG TABLET    Take 500 mg by mouth 2 times daily (with meals)    POLYETHYLENE GLYCOL (MIRALAX) POWDER    Take 17 g by mouth 2 times daily as needed (constipation)       Social history:  reports that she quit smoking about 13 years ago. She has never used smokeless tobacco. She reports that she does not drink alcohol or use drugs. Family history:  History reviewed. No pertinent family history. Exam  ED Triage Vitals [20 0922]   BP Temp Temp Source Pulse Resp SpO2 Height Weight   111/63 98.8 °F (37.1 °C) Oral 59 16 98 % 5' 3\" (1.6 m) 153 lb (69.4 kg)     Nursing note and vitals reviewed. Constitutional: Well developed, well nourished. Non-toxic in appearance. HENT:      Head: Normocephalic and atraumatic. Ears: External ears normal.      Nose: Nose normal.     Mouth: Membrane mucosa moist and pink. Eyes: Anicteric sclera. No discharge. Neck: Supple. Trachea midline. Cardiovascular: RRR; no murmurs, rubs, or gallops. Pulmonary/Chest: Effort normal. No respiratory distress. CTAB. No stridor. No wheezes. No rales. Abdominal: Soft. No distension. Non-tender to palpation  Musculoskeletal: Moves all extremities. No gross deformity. Neurological: Alert and oriented to person, place, time, and situation. CN II-XII intact as tested.  Strength 5/5 in upper and lower 8.2 g/dL    Alb 4.2 3.4 - 5.0 g/dL    Albumin/Globulin Ratio 1.5 1.1 - 2.2    Total Bilirubin 0.4 0.0 - 1.0 mg/dL    Alkaline Phosphatase 60 40 - 129 U/L    ALT 20 10 - 40 U/L    AST 32 15 - 37 U/L    Globulin 2.8 g/dL   Lipase   Result Value Ref Range    Lipase 64.0 (H) 13.0 - 60.0 U/L   Troponin   Result Value Ref Range    Troponin <0.01 <0.01 ng/mL   Urinalysis, reflex to microscopic   Result Value Ref Range    Color, UA YELLOW Straw/Yellow    Clarity, UA Clear Clear    Glucose, Ur Negative Negative mg/dL    Bilirubin Urine Negative Negative    Ketones, Urine Negative Negative mg/dL    Specific Gravity, UA 1.005 1.005 - 1.030    Blood, Urine Negative Negative    pH, UA 6.5 5.0 - 8.0    Protein, UA Negative Negative mg/dL    Urobilinogen, Urine 0.2 <2.0 E.U./dL    Nitrite, Urine Negative Negative    Leukocyte Esterase, Urine Negative Negative    Microscopic Examination Not Indicated     Urine Type NotGiven    CK   Result Value Ref Range    Total  (H) 26 - 192 U/L       MDM and ED Course  Surprisingly, the patient had a mild hyperkalemia at 5.5 without obvious cause initially. Given her history of exercise, I added on CK to see if this was due to muscle/cellular breakdown. The CK was mildly elevated, so I suspect this to be the case. I am putting the patient on 3 days of Kayexalate to bring her potassium down and I have advised her to follow-up with her PCP. Other causes, such as renal failure, were not present, so I do not believe she needs a nephrology evaluation or consultation at this time. The patient's vital signs are stable and she has no evidence of hyperkalemia changes on her EKG, so further interventions or acute inpatient treatment are also not indicated. This may explain the patient's vague symptoms. Further, although the patient was concerned she has a UTI, her urine was found to be completely clean with no evidence of that at all, so antibiotics are not indicated.     Also have low suspicion of stroke or stroke spectrum disease. The patient has an NIH stroke score of 0 with a completely normal neurologic exam and a normal test of skew, so her risk of stroke is well under 1%. Further evaluation for this is not indicated. I estimate there is LOW risk for INTRACRANIAL HEMORRHAGE,SUBARACHNOID HEMORRHAGE, SUBDURAL HEMATOMA, STROKE, TIA, MENINGITIS, ENCEPHALITIS, SPINAL CORD INJURY/COMPRESSION/LESION, VERTEBROBASILAR INSUFFICIENCY, thus I consider the discharge disposition reasonable. Romana Pak and I have discussed the diagnosis and risks, and we agree with discharging home to follow-up with their primary doctor. We also discussed returning to the Emergency Department immediately if new or worsening symptoms occur. We have discussed the symptoms which are most concerning (e.g., numbness, tingling, weakness, facial droop, speech changes) that necessitate immediate return. Final Impression  1. Acute hyperkalemia    2. Dizziness        Blood pressure 111/63, pulse 59, temperature 98.8 °F (37.1 °C), temperature source Oral, resp. rate 13, height 5' 3\" (1.6 m), weight 153 lb (69.4 kg), last menstrual period 06/24/2020, SpO2 98 %. Disposition:  Discharge      Patient Referrals:  [facility] Emergency Department  [address] [facility]  [phone]    As needed, If symptoms worsen or new symptoms develop    Rosmery Arriaga MD  [address] [phone]    In 3 days  for re-evaluation and re-check of your potassium level      Discharge Medications:  New Prescriptions    MECLIZINE (ANTIVERT) 25 MG TABLET    Take 1 tablet by mouth 3 times daily as needed for Dizziness    SODIUM POLYSTYRENE (SPS) 15 GM/60ML SUSPENSION    Take 60 mLs by mouth daily for 3 days         This chart was generated using the [address] [place] [address] [address] dictation system.  I created this record but it may contain dictation errors given the limitations of this technology.        Rebeca Harding MD  07/24/20 3139

## 2020-07-24 NOTE — ED NOTES
Patient keeps coming in king asking to leave and yelling at staff regarding wanting to leave. Patient was updated on need for CK for discharge. Patient refuses to stay in room to wait regardless of infection control and CDC guidelines. Patient refusing vitals at this time.       Lola Thomas RN  07/24/20 3536

## 2020-12-12 ENCOUNTER — HOSPITAL ENCOUNTER (EMERGENCY)
Age: 45
Discharge: HOME | End: 2020-12-12
Payer: COMMERCIAL

## 2020-12-12 VITALS
RESPIRATION RATE: 18 BRPM | SYSTOLIC BLOOD PRESSURE: 113 MMHG | BODY MASS INDEX: 25.69 KG/M2 | WEIGHT: 145 LBS | HEIGHT: 63 IN | TEMPERATURE: 98.1 F | DIASTOLIC BLOOD PRESSURE: 78 MMHG | HEART RATE: 69 BPM | OXYGEN SATURATION: 96 %

## 2020-12-12 PROCEDURE — 99283 EMERGENCY DEPT VISIT LOW MDM: CPT

## 2020-12-12 RX ORDER — HYDROCODONE BITARTRATE AND ACETAMINOPHEN 5; 325 MG/1; MG/1
1 TABLET ORAL EVERY 6 HOURS PRN
Qty: 8 TABLET | Refills: 0 | Status: SHIPPED | OUTPATIENT
Start: 2020-12-12 | End: 2020-12-14

## 2020-12-12 RX ORDER — PENICILLIN V POTASSIUM 500 MG/1
500 TABLET ORAL 4 TIMES DAILY
Qty: 28 TABLET | Refills: 0 | Status: SHIPPED | OUTPATIENT
Start: 2020-12-12 | End: 2020-12-19

## 2020-12-12 RX ORDER — NAPROXEN 500 MG/1
500 TABLET ORAL 2 TIMES DAILY PRN
Qty: 20 TABLET | Refills: 0 | OUTPATIENT
Start: 2020-12-12 | End: 2022-05-24

## 2020-12-12 ASSESSMENT — ENCOUNTER SYMPTOMS
ABDOMINAL PAIN: 0
SHORTNESS OF BREATH: 0
DIARRHEA: 0
CHEST TIGHTNESS: 0
NAUSEA: 0
VOMITING: 0

## 2020-12-12 ASSESSMENT — PAIN SCALES - GENERAL: PAINLEVEL_OUTOF10: 8

## 2020-12-12 NOTE — ED PROVIDER NOTES
[address] [facility]        Pt Name: Andrea Pineda  MRN: [id number]  [place] 1975  Date of evaluation: 12/12/2020  Provider: Amie Cesar PA-C  PCP: Laura Mccullough MD    ERIN. I have evaluated this patient. My supervising physician was available for consultation. CHIEF COMPLAINT       Chief Complaint   Patient presents with    Dental Pain     Pt presents to the ED with left sided tooth pain after having a root canal done on Monday. Pt also thinks they need to have an antibiotic because they believe that they it is in infected. Denies N/V/Fever        HISTORY OF PRESENT ILLNESS   (Location, Timing/Onset, Context/Setting, Quality, Duration, Modifying Factors, Severity, Associated Signs and Symptoms)  Note limiting factors. Andrea Pineda is a 39 y.o. female presents the emergency department with reports of dentalgia. Patient had difficulties with a root canal.  She states that she has been dealing with this tooth for some time and last week at the dental clinic at [facility] she had the tooth extracted. Patient tells me that she does smoke. She states she has had increasing pain over the past 2 days and notes that what used to be a blood clot in the area is no tiny yellowish in color. She states that she has pain and discomfort rated to be 8 out of 10. She is able to eat and drink and handle her secretions and swallow without significant difficulty but because of increasing symptoms and concern for potential infection she presents to the ED for evaluation and treatment. Nursing Notes were all reviewed and agreed with or any disagreements were addressed in the HPI. REVIEW OF SYSTEMS    (2-9 systems for level 4, 10 or more for level 5)     Review of Systems   Constitutional: Negative for activity change, chills and fever. HENT: Positive for dental problem.     Respiratory: Negative for chest tightness and shortness of breath. Cardiovascular: Negative for chest pain. Gastrointestinal: Negative for abdominal pain, diarrhea, nausea and vomiting. Genitourinary: Negative for dysuria and flank pain. Positives and Pertinent negatives as per HPI. Except as noted above in the ROS, all other systems were reviewed and negative. PAST MEDICAL HISTORY     Past Medical History:   Diagnosis Date    Bartholin's cyst     Bell's palsy     Necrotizing pancreatitis          SURGICAL HISTORY     Past Surgical History:   Procedure Laterality Date    BARTHOLIN GLAND CYST EXCISION       SECTION           CURRENTMEDICATIONS       Previous Medications    ALBUTEROL SULFATE HFA (PROVENTIL HFA) 108 (90 BASE) MCG/ACT INHALER    Inhale 2 puffs into the lungs every 4 hours as needed for Wheezing or Shortness of Breath With spacer (and mask if indicated). Thanks. POLYETHYLENE GLYCOL (MIRALAX) POWDER    Take 17 g by mouth 2 times daily as needed (constipation)    SODIUM POLYSTYRENE (SPS) 15 GM/60ML SUSPENSION    Take 60 mLs by mouth daily for 3 days         ALLERGIES     Shellfish-derived products    FAMILYHISTORY     History reviewed. No pertinent family history. SOCIAL HISTORY       Social History     Tobacco Use    Smoking status: Former Smoker     Quit date: 2007     Years since quittin.4    Smokeless tobacco: Never Used   Substance Use Topics    Alcohol use: No    Drug use: No       SCREENINGS             PHYSICAL EXAM    (up to 7 for level 4, 8 or more for level 5)     ED Triage Vitals [20 1017]   BP Temp Temp Source Pulse Resp SpO2 Height Weight   113/78 98.1 °F (36.7 °C) Oral 69 18 96 % 5' 3\" (1.6 m) 145 lb (65.8 kg)       Physical Exam  Vitals signs and nursing note reviewed. Constitutional:       General: She is awake. She is not in acute distress. Appearance: She is well-developed. She is not ill-appearing or diaphoretic. HENT:      Head: Normocephalic and atraumatic.  No raccoon eyes, Alvarado's sign or laceration. Right Ear: Hearing and external ear normal.      Left Ear: Hearing and external ear normal.      Nose: Nose normal.      Mouth/Throat:      Lips: Pink. Mouth: Mucous membranes are moist.      Dentition: Abnormal dentition. Dental tenderness present. No dental caries. Eyes:      General: No scleral icterus. Right eye: No discharge. Left eye: No discharge. Conjunctiva/sclera: Conjunctivae normal.   Neck:      Musculoskeletal: Normal range of motion. Vascular: No JVD. Cardiovascular:      Rate and Rhythm: Normal rate and regular rhythm. Heart sounds: No murmur. No friction rub. No gallop. Pulmonary:      Effort: Pulmonary effort is normal. No accessory muscle usage or respiratory distress. Breath sounds: Normal breath sounds. No wheezing, rhonchi or rales. Skin:     General: Skin is warm and dry. Neurological:      Mental Status: She is alert and oriented to person, place, and time. GCS: GCS eye subscore is 4. GCS verbal subscore is 5. GCS motor subscore is 6. Cranial Nerves: No cranial nerve deficit. Sensory: No sensory deficit. Coordination: Coordination normal.   Psychiatric:         Behavior: Behavior normal. Behavior is cooperative. DIAGNOSTIC RESULTS   LABS:    Labs Reviewed - No data to display    All other labs were within normal range or not returned as of this dictation. EKG: All EKG's are interpreted by the Emergency Department Physician in the absence of a cardiologist.  Please see their note for interpretation of EKG. RADIOLOGY:   Non-plain film images such as CT, Ultrasound and MRI are read by the radiologist. Plain radiographic images are visualized and preliminarily interpreted by the ED Provider with the below findings:        Interpretation per the Radiologist below, if available at the time of this note:    No orders to display     No results found.         PROCEDURES Dentalgia          DISPOSITION/PLAN   DISPOSITION Decision To Discharge 12/12/2020 10:54:24 AM      PATIENT REFERREDTO:    Call [facility] on Monday and inform them of the events        [facility] Emergency Department  [address] [place] [address]  [phone]    If symptoms worsen      DISCHARGE MEDICATIONS:  New Prescriptions    HYDROCODONE-ACETAMINOPHEN (NORCO) 5-325 MG PER TABLET    Take 1 tablet by mouth every 6 hours as needed for Pain for up to 2 days.     NAPROXEN (NAPROSYN) 500 MG TABLET    Take 1 tablet by mouth 2 times daily as needed for Pain    PENICILLIN V POTASSIUM (VEETID) 500 MG TABLET    Take 1 tablet by mouth 4 times daily for 7 days       DISCONTINUED MEDICATIONS:  Discontinued Medications    IBUPROFEN (ADVIL) 200 MG TABLET    Take 3 tablets by mouth every 8 hours as needed for Pain    IBUPROFEN (ADVIL;MOTRIN) 600 MG TABLET    Take 1 tablet by mouth every 6 hours as needed for Pain    NAPROXEN (NAPROSYN) 500 MG TABLET    Take 500 mg by mouth 2 times daily (with meals)              (Please note that portions of this note were completed with a voice recognition program.  Efforts were made to edit the dictations but occasionally words are mis-transcribed.)    Cristofer Truong PA-C (electronically signed)           Maury Gaviria PA-C  12/12/20 1100

## 2020-12-12 NOTE — ED NOTES
Pt Discharged in stable condition, VSS, no signs of distress, discharge instructions and meds reviewed. Pt verbalizes understanding and states no further questions or concerns unaddressed.        Ramesh Johansen RN  12/12/20 5758

## 2021-08-11 ENCOUNTER — HOSPITAL ENCOUNTER (EMERGENCY)
Age: 46
Discharge: HOME | End: 2021-08-11
Payer: COMMERCIAL

## 2021-08-11 VITALS
WEIGHT: 141 LBS | TEMPERATURE: 97.5 F | DIASTOLIC BLOOD PRESSURE: 64 MMHG | BODY MASS INDEX: 24.98 KG/M2 | OXYGEN SATURATION: 100 % | HEIGHT: 63 IN | SYSTOLIC BLOOD PRESSURE: 100 MMHG | HEART RATE: 67 BPM | RESPIRATION RATE: 18 BRPM

## 2021-08-11 DIAGNOSIS — N89.8 VAGINAL DISCHARGE: Primary | ICD-10-CM

## 2021-08-11 LAB
BACTERIA WET PREP: NORMAL
BACTERIA: ABNORMAL /HPF
BILIRUBIN URINE: NEGATIVE
BLOOD, URINE: NEGATIVE
CLARITY: ABNORMAL
CLUE CELLS: NORMAL
COLOR: YELLOW
EPITHELIAL CELLS WET PREP: NORMAL
EPITHELIAL CELLS, UA: 8 /HPF (ref 0–5)
GLUCOSE URINE: NEGATIVE MG/DL
HCG(URINE) PREGNANCY TEST: NEGATIVE
HYALINE CASTS: 2 /LPF (ref 0–8)
KETONES, URINE: NEGATIVE MG/DL
LEUKOCYTE ESTERASE, URINE: ABNORMAL
MICROSCOPIC EXAMINATION: YES
NITRITE, URINE: NEGATIVE
PH UA: 5.5 (ref 5–8)
PROTEIN UA: NEGATIVE MG/DL
RBC UA: 2 /HPF (ref 0–4)
RBC WET PREP: NORMAL
SOURCE WET PREP: NORMAL
SPECIFIC GRAVITY UA: 1.02 (ref 1–1.03)
TRICHOMONAS PREP: NORMAL
URINE REFLEX TO CULTURE: ABNORMAL
URINE TYPE: ABNORMAL
UROBILINOGEN, URINE: 0.2 E.U./DL
WBC UA: 6 /HPF (ref 0–5)
WBC WET PREP: NORMAL
YEAST WET PREP: NORMAL

## 2021-08-11 PROCEDURE — 6360000002 HC RX W HCPCS: Performed by: PHYSICIAN ASSISTANT

## 2021-08-11 PROCEDURE — 96372 THER/PROPH/DIAG INJ SC/IM: CPT

## 2021-08-11 PROCEDURE — 99283 EMERGENCY DEPT VISIT LOW MDM: CPT

## 2021-08-11 PROCEDURE — 87210 SMEAR WET MOUNT SALINE/INK: CPT

## 2021-08-11 PROCEDURE — 6370000000 HC RX 637 (ALT 250 FOR IP): Performed by: PHYSICIAN ASSISTANT

## 2021-08-11 PROCEDURE — 81001 URINALYSIS AUTO W/SCOPE: CPT

## 2021-08-11 PROCEDURE — 87491 CHLMYD TRACH DNA AMP PROBE: CPT

## 2021-08-11 PROCEDURE — 84703 CHORIONIC GONADOTROPIN ASSAY: CPT

## 2021-08-11 PROCEDURE — 87591 N.GONORRHOEAE DNA AMP PROB: CPT

## 2021-08-11 RX ORDER — CEFTRIAXONE 1 G/1
500 INJECTION, POWDER, FOR SOLUTION INTRAMUSCULAR; INTRAVENOUS ONCE
Status: COMPLETED | OUTPATIENT
Start: 2021-08-11 | End: 2021-08-11

## 2021-08-11 RX ORDER — AZITHROMYCIN 250 MG/1
1000 TABLET, FILM COATED ORAL ONCE
Status: COMPLETED | OUTPATIENT
Start: 2021-08-11 | End: 2021-08-11

## 2021-08-11 RX ADMIN — AZITHROMYCIN MONOHYDRATE 1000 MG: 250 TABLET ORAL at 15:15

## 2021-08-11 RX ADMIN — CEFTRIAXONE SODIUM 500 MG: 1 INJECTION, POWDER, FOR SOLUTION INTRAMUSCULAR; INTRAVENOUS at 15:15

## 2021-08-11 ASSESSMENT — ENCOUNTER SYMPTOMS
DIARRHEA: 0
ABDOMINAL PAIN: 0
SHORTNESS OF BREATH: 0
COUGH: 0
RHINORRHEA: 0
NAUSEA: 0
VOMITING: 0

## 2021-08-11 ASSESSMENT — PAIN SCALES - GENERAL: PAINLEVEL_OUTOF10: 7

## 2021-08-11 NOTE — ED PROVIDER NOTES
[facility]        Pt Name: Lindsey Bedolla  MRN: [id number]  [place] 1975  Date of evaluation: 8/11/2021  Provider: Olamide Vance PA-C  PCP: Alex Varela MD  Note Started: 3:26 PM EDT       ERIN. I have evaluated this patient. My supervising physician was available for consultation. CHIEF COMPLAINT       Chief Complaint   Patient presents with    Vaginal Pain     pt states vaginal irritation concerned for STD, bad white discharge, pt states swollen glands, bad odor       HISTORY OF PRESENT ILLNESS   (Location, Timing/Onset, Context/Setting, Quality, Duration, Modifying Factors, Severity, Associated Signs and Symptoms)  Note limiting factors. Chief Complaint: Vaginal discomfort    Lindsey Bedolla is a 55 y.o. female who presents to the emergency department today for evaluation for vaginal discomfort. The patient states that she was sexually active with a new partner in January of this year, and she states that she has not actually been sexually active since. The patient states that over the past 2 weeks she has noticed some white vaginal discharge and some discomfort to her vaginal area and is concerned that this could be an STD from her a sexual encounter in January. Patient denies any vaginal bleeding. She denies any abdominal pain, pelvic pain. She has no fever chills. She has no nausea, vomiting or diarrhea. She has no dysuria hematuria patient denies any rashes. She is just reporting discomfort to her vaginal area only which she is rating is a 7/10, patient denies any known alleviating or aggravating factors. She states that she like to be tested and treated in the ED while she is here. Nursing Notes were all reviewed and agreed with or any disagreements were addressed in the HPI.     REVIEW OF SYSTEMS    (2-9 systems for level 4, 10 or more for level 5)     Review of Systems   Constitutional: Negative for activity change, appetite change, chills and fever. HENT: Negative for congestion and rhinorrhea. Respiratory: Negative for cough and shortness of breath. Cardiovascular: Negative for chest pain. Gastrointestinal: Negative for abdominal pain, diarrhea, nausea and vomiting. Genitourinary: Positive for vaginal discharge. Negative for difficulty urinating, dysuria and hematuria. Positives and Pertinent negatives as per HPI. Except as noted above in the ROS, all other systems were reviewed and negative. PAST MEDICAL HISTORY     Past Medical History:   Diagnosis Date    Bartholin's cyst     Bell's palsy     Necrotizing pancreatitis          SURGICAL HISTORY     Past Surgical History:   Procedure Laterality Date    BARTHOLIN GLAND CYST EXCISION       SECTION           [address]       Discharge Medication List as of 2021  4:03 PM      CONTINUE these medications which have NOT CHANGED    Details   albuterol sulfate HFA (PROVENTIL HFA) 108 (90 Base) MCG/ACT inhaler Inhale 2 puffs into the lungs every 4 hours as needed for Wheezing or Shortness of Breath With spacer (and mask if indicated). Thanks. , Disp-1 Inhaler, R-0Print      naproxen (NAPROSYN) 500 MG tablet Take 1 tablet by mouth 2 times daily as needed for Pain, Disp-20 tablet, R-0Print      sodium polystyrene (SPS) 15 GM/60ML suspension Take 60 mLs by mouth daily for 3 days, Disp-180 mL,R-0Print      polyethylene glycol (MIRALAX) powder Take 17 g by mouth 2 times daily as needed (constipation), Disp-510 g, R-0Print               ALLERGIES     Shellfish-derived products    FAMILYHISTORY     History reviewed. No pertinent family history. SOCIAL HISTORY       Social History     Tobacco Use    Smoking status: Former Smoker     Quit date: 2007     Years since quittin.1    Smokeless tobacco: Never Used   Vaping Use    Vaping Use: Never used   Substance Use Topics    Alcohol use: No    Drug use:  No SCREENINGS             PHYSICAL EXAM    (up to 7 for level 4, 8 or more for level 5)     ED Triage Vitals [08/11/21 1448]   BP Temp Temp Source Pulse Resp SpO2 Height Weight   100/64 97.5 °F (36.4 °C) Oral 67 18 100 % 5' 3\" (1.6 m) 141 lb (64 kg)       Physical Exam  Vitals and nursing note reviewed. Constitutional:       Appearance: She is well-developed. She is not diaphoretic. HENT:      Head: Normocephalic and atraumatic. Right Ear: External ear normal.      Left Ear: External ear normal.      Nose: Nose normal.   Eyes:      General:         Right eye: No discharge. Left eye: No discharge. Neck:      Trachea: No tracheal deviation. Cardiovascular:      Rate and Rhythm: Normal rate and regular rhythm. Heart sounds: No murmur heard. Pulmonary:      Effort: Pulmonary effort is normal. No respiratory distress. Breath sounds: Normal breath sounds. No wheezing or rales. Abdominal:      General: Bowel sounds are normal. There is no distension. Palpations: Abdomen is soft. Tenderness: There is no abdominal tenderness. There is no guarding. Genitourinary:     Comments: External  exam performed only, there is erythema to the bilateral labia majora. No ulcers. No cankers  Musculoskeletal:         General: Normal range of motion. Cervical back: Normal range of motion and neck supple. Skin:     General: Skin is warm and dry. Neurological:      Mental Status: She is alert and oriented to person, place, and time.    Psychiatric:         Behavior: Behavior normal.         DIAGNOSTIC RESULTS   LABS:    Labs Reviewed   URINE RT REFLEX TO CULTURE - Abnormal; Notable for the following components:       Result Value    Clarity, UA CLOUDY (*)     Leukocyte Esterase, Urine TRACE (*)     All other components within normal limits    Narrative:     Performed at:  [facility] [address], [facility], [address]   Phone [phone] for possible STDs as she states that she did have a new sexual partner in January. She tells me that she has not had any sexual encounters since then but she believes that there could be an STD present    Physical examination, her abdomen is benign. External  exam performed only does show mild erythema,  bilateral labia majora    Urine shows no evidence of infection, she has 8 epithelial cells and 6 red blood cells we will culture before treatment. Pregnancy is negative. Wet prep is pending    Gonorrhea and Chlamydia cultures are pending. Patient was given Rocephin and Zithromax in the ED per her request.  She did not feel that she would be compliant with doxycycline. Patient elected to leave before the wet prep results, she states that she has to go  her son. I did discuss with her that I would call in a prescription if needed. I recommend that she follow-up with her primary care physician within 2 to 3 days for reevaluation. She is to return to the ED for any new or worsening symptoms, stable for discharge. My suspicion is low at this time for topic pregnancy, tubo-ovarian abscess, ovarian torsion, acute surgical abdomen kidney stone, infected stone, pyelonephritis or other emergent etiology. FINAL IMPRESSION      1.  Vaginal discharge          DISPOSITION/PLAN   DISPOSITION        PATIENT REFERRED TO:  Ángel Moe MD  [address] [place], [place] [id number] [id number]    Schedule an appointment as soon as possible for a visit in 2 days      [facility] Emergency Department  [address] [facility]  [phone]    As needed, If symptoms worsen      DISCHARGE MEDICATIONS:  Discharge Medication List as of 8/11/2021  4:03 PM          DISCONTINUED MEDICATIONS:  Discharge Medication List as of 8/11/2021  4:03 PM      STOP taking these medications       ibuprofen (ADVIL) 200 MG tablet Comments:   Reason for Stopping:                      (Please note that portions of this note were completed with a voice recognition program.  Efforts were made to edit the dictations but occasionally words are mis-transcribed.)    James Alexandre PA-C (electronically signed)            James Alexandre PA-C  08/11/21 2313

## 2021-08-12 LAB
C TRACH DNA GENITAL QL NAA+PROBE: NEGATIVE
N. GONORRHOEAE DNA: NEGATIVE

## 2021-10-14 ENCOUNTER — HOSPITAL ENCOUNTER (EMERGENCY)
Age: 46
Discharge: HOME | End: 2021-10-14
Attending: STUDENT IN AN ORGANIZED HEALTH CARE EDUCATION/TRAINING PROGRAM
Payer: COMMERCIAL

## 2021-10-14 VITALS
HEART RATE: 64 BPM | OXYGEN SATURATION: 100 % | RESPIRATION RATE: 19 BRPM | TEMPERATURE: 98.2 F | DIASTOLIC BLOOD PRESSURE: 68 MMHG | SYSTOLIC BLOOD PRESSURE: 108 MMHG

## 2021-10-14 DIAGNOSIS — N89.8 VAGINAL DISCHARGE: Primary | ICD-10-CM

## 2021-10-14 LAB
BILIRUBIN URINE: NEGATIVE
BLOOD, URINE: NEGATIVE
CLARITY: CLEAR
COLOR: YELLOW
GLUCOSE URINE: NEGATIVE MG/DL
HCG(URINE) PREGNANCY TEST: NEGATIVE
KETONES, URINE: NEGATIVE MG/DL
LEUKOCYTE ESTERASE, URINE: NEGATIVE
MICROSCOPIC EXAMINATION: NORMAL
NITRITE, URINE: NEGATIVE
PH UA: 6.5 (ref 5–8)
PROTEIN UA: NEGATIVE MG/DL
SPECIFIC GRAVITY UA: 1.01 (ref 1–1.03)
URINE TYPE: NORMAL
UROBILINOGEN, URINE: 0.2 E.U./DL

## 2021-10-14 PROCEDURE — 99284 EMERGENCY DEPT VISIT MOD MDM: CPT

## 2021-10-14 PROCEDURE — 84703 CHORIONIC GONADOTROPIN ASSAY: CPT

## 2021-10-14 PROCEDURE — 81003 URINALYSIS AUTO W/O SCOPE: CPT

## 2021-10-14 ASSESSMENT — ENCOUNTER SYMPTOMS
EYES NEGATIVE: 1
ALLERGIC/IMMUNOLOGIC NEGATIVE: 1
GASTROINTESTINAL NEGATIVE: 1
RESPIRATORY NEGATIVE: 1

## 2021-10-14 NOTE — ED PROVIDER NOTES
ED Attending Attestation Note     Date of evaluation: 10/14/2021    This patient was seen by the resident. I have seen and examined the patient, agree with the workup, evaluation, management and diagnosis. The care plan has been discussed. I have reviewed the ECG and concur with the resident's interpretation. My assessment reveals a woman who reports a chronic history of concern for odor in her vaginal area. She has had multiple evaluations without etiology identified. She has no abdominal pain today and symptoms are isolated to vaginal odor with possible associated discharge on my history. She had a recent CT abdomen pelvis which was reassuring. There was some concern for possible diverticulosis on that CT scan, but the history and exam here does not suggest any type of diverticulitis. On exam, she has a benign abdomen with not TTP, rebound or guarding. No fever and reassuring vitals. Given the absence abdominal pain we do not feel that laboratory studies the bladder required. Will check urinalysis and plan for evaluation clinically of the pelvic region. If reassuring clinical evaluation with no evidence of Bartholin's gland cyst recurrence or significant abnormality, I suspect she likely will be able to follow-up with gynecology.      Nasir Olivares MD  10/14/21 5984

## 2021-10-14 NOTE — ED PROVIDER NOTES
[facility] RESIDENT NOTE       Date of evaluation: 10/14/2021    Chief Complaint     Vaginal Discharge (pt states, \"Ann had an odor discharge, that smells like the gas in my colon. Ann been FL3XX and I think I have a fistula. Its been some months now. \" pt states her GYN told her everything was normal, states, \"Ann been to 5 different professionals and they state everything was normal. I guess I need to get my  to make them tell me the truth. \")      History of Present Illness     Margo Garza is a 55 y.o. female who presents with vaginal discharge. Patient states she has had the vaginal discharge on and off for the last 5 months. States the discharge is sometimes white and is sometimes clear. Describes it as very foul-smelling, most like fecal material.  She has not had any vaginal pain. She has not had any urinary symptoms such as frequency urgency, or pelvic pain. She has had normal bowel movements without any constipation or diarrhea. She states she was sexually active in January however has since had STD testing multiple times it has been negative and has also been empirically treated for gonorrhea chlamydia and bacterial vaginosis and has not since had sexual contact. Patient also states she has been seen multiple times by other providers including her primary care doctor and gynecologist who had performed exams that have been normal.    Review of Systems     Review of Systems   Constitutional: Negative. HENT: Negative. Eyes: Negative. Respiratory: Negative. Cardiovascular: Negative. Gastrointestinal: Negative. Endocrine: Negative. Genitourinary: Positive for vaginal discharge. Negative for difficulty urinating, frequency, genital sores, pelvic pain and urgency. Musculoskeletal: Negative. Skin: Negative. Allergic/Immunologic: Negative. Neurological: Negative. Hematological: Negative. Psychiatric/Behavioral: Negative. All other systems reviewed and are negative. Past Medical, Surgical, Family, and Social History     She has a past medical history of Bartholin's cyst, Bell's palsy, and Necrotizing pancreatitis. She has a past surgical history that includes  section and Bartholin gland cyst excision. Her family history is not on file. She reports that she quit smoking about 14 years ago. She has never used smokeless tobacco. She reports that she does not drink alcohol and does not use drugs. Medications     Discharge Medication List as of 10/14/2021  2:57 PM      CONTINUE these medications which have NOT CHANGED    Details   naproxen (NAPROSYN) 500 MG tablet Take 1 tablet by mouth 2 times daily as needed for Pain, Disp-20 tablet, R-0Print      sodium polystyrene (SPS) 15 GM/60ML suspension Take 60 mLs by mouth daily for 3 days, Disp-180 mL,R-0Print      albuterol sulfate HFA (PROVENTIL HFA) 108 (90 Base) MCG/ACT inhaler Inhale 2 puffs into the lungs every 4 hours as needed for Wheezing or Shortness of Breath With spacer (and mask if indicated). Thanks. , Disp-1 Inhaler, R-0Print      polyethylene glycol (MIRALAX) powder Take 17 g by mouth 2 times daily as needed (constipation), Disp-510 g, R-0Print             Allergies     She is allergic to shellfish-derived products. Physical Exam     INITIAL VITALS: BP: 107/73, Temp: 98.2 °F (36.8 °C), Pulse: 65, Resp: 19, SpO2: 100 %   Physical Exam  Vitals and nursing note reviewed. Exam conducted with a chaperone present. Constitutional:       General: She is not in acute distress. Appearance: She is normal weight. She is not toxic-appearing. HENT:      Head: Normocephalic and atraumatic. Right Ear: Tympanic membrane normal.      Left Ear: Tympanic membrane normal.      Nose: Nose normal. No congestion. Mouth/Throat:      Mouth: Mucous membranes are moist.      Pharynx: Oropharynx is clear.    Eyes:      Extraocular Movements: Extraocular movements intact. Conjunctiva/sclera: Conjunctivae normal.      Pupils: Pupils are equal, round, and reactive to light. Cardiovascular:      Rate and Rhythm: Normal rate and regular rhythm. Pulses: Normal pulses. Pulmonary:      Effort: Pulmonary effort is normal.      Breath sounds: Normal breath sounds. Abdominal:      General: There is no distension. Palpations: Abdomen is soft. Tenderness: There is no guarding. Hernia: There is no hernia in the left inguinal area or right inguinal area. Genitourinary:     Exam position: Supine. Pubic Area: No rash or pubic lice. Labia:         Right: No rash, tenderness or lesion. Left: No rash, tenderness or lesion. Urethra: No urethral swelling. Vagina: Normal. No signs of injury and foreign body. No vaginal discharge, erythema, tenderness, bleeding or lesions. Cervix: No cervical motion tenderness, discharge, friability, lesion or erythema. Uterus: Normal. Not tender. Adnexa: Right adnexa normal and left adnexa normal.   Musculoskeletal:         General: Normal range of motion. Cervical back: Neck supple. No rigidity or tenderness. Right lower leg: No edema. Lymphadenopathy:      Lower Body: No right inguinal adenopathy. Skin:     General: Skin is warm and dry. Capillary Refill: Capillary refill takes less than 2 seconds. Findings: No erythema or rash. Neurological:      General: No focal deficit present. Mental Status: She is alert and oriented to person, place, and time. Mental status is at baseline.    Psychiatric:         Mood and Affect: Mood normal.         Behavior: Behavior normal.         DiagnosticResults       RADIOLOGY:  No orders to display       LABS:   Results for orders placed or performed during the hospital encounter of 10/14/21   Urinalysis, reflex to microscopic   Result Value Ref Range    Color, UA Yellow Straw/Yellow Clarity, UA Clear Clear    Glucose, Ur Negative Negative mg/dL    Bilirubin Urine Negative Negative    Ketones, Urine Negative Negative mg/dL    Specific Gravity, UA 1.015 1.005 - 1.030    Blood, Urine Negative Negative    pH, UA 6.5 5.0 - 8.0    Protein, UA Negative Negative mg/dL    Urobilinogen, Urine 0.2 <2.0 E.U./dL    Nitrite, Urine Negative Negative    Leukocyte Esterase, Urine Negative Negative    Microscopic Examination Not Indicated     Urine Type NotGiven    Pregnancy, urine   Result Value Ref Range    HCG(Urine) Pregnancy Test Negative Detects HCG level >20 MIU/mL       ED BEDSIDE ULTRASOUND:  None     RECENT VITALS:  BP: 108/68, Temp: 98.2 °F (36.8 °C), Pulse: 64,Resp: 19, SpO2: 100 %     Procedures     None     ED Course     Nursing Notes, Past Medical Hx, Past Surgical Hx, Social Hx, Allergies, and Family Hx were reviewed. The patient was given the followingmedications:  No orders of the defined types were placed in this encounter. CONSULTS:  None    MEDICAL DECISION MAKING / ASSESSMENT / Kelley Raymundo is a 55 y.o. female presenting with vaginal discharge. On physical exam the patient's abdomen is soft and nontender. On genitourinary exam, she has normal external genitalia without any rashes or swelling. On internal exam she has a normal vagina without any redness, tenderness or areas of abscess or fluctuance. She has a normal-appearing cervix without any redness, os closed cervical os and no cervical motional tenderness. No evidence of a fistula or abscess. Patient also did not have any vaginal discharge on my exam and had normal vaginal odor. Overall patient has reassuring exam, will be given a referral for gynecology for second opinion as patient does not like her current gynecologist.  Urinalysis was sent which was normal.  Patient declined STD testing given prior negative testing without any new sexual contacts.     This patient was also evaluated by the attending physician. All care plans werediscussed and agreed upon. Clinical Impression     1.  Vaginal discharge        Disposition     PATIENT REFERRED TO:  Ann Delaney MD  [address] [place] [address]  [phone]            DISCHARGE MEDICATIONS:  Discharge Medication List as of 10/14/2021  2:57 PM          DISPOSITION Decision To Discharge 10/14/2021 02:38:34 PM      Cecilio Menjivar MD  Resident  10/14/21 0195

## 2021-10-15 ENCOUNTER — TELEPHONE (OUTPATIENT)
Dept: GYNECOLOGY | Age: 46
End: 2021-10-15

## 2021-10-15 NOTE — TELEPHONE ENCOUNTER
Seen in ER at [facility] on 10/14/21 for vaginal discharge. She is calling requesting an appointment, please review and advise how quickly she needs to be seen.  She can be reached at [phone]

## 2021-10-15 NOTE — TELEPHONE ENCOUNTER
Patient advised, but Patient would like to see Dr. Yamile Jin and she would like a appointment on a Thursday in February, appt made DONE

## 2022-02-08 ENCOUNTER — OFFICE VISIT (OUTPATIENT)
Dept: GYNECOLOGY | Age: 47
End: 2022-02-08
Payer: COMMERCIAL

## 2022-02-08 VITALS
SYSTOLIC BLOOD PRESSURE: 106 MMHG | OXYGEN SATURATION: 98 % | HEIGHT: 63 IN | DIASTOLIC BLOOD PRESSURE: 72 MMHG | BODY MASS INDEX: 25.45 KG/M2 | RESPIRATION RATE: 17 BRPM | HEART RATE: 76 BPM | WEIGHT: 143.6 LBS

## 2022-02-08 DIAGNOSIS — R82.90 ABNORMAL URINE ODOR: ICD-10-CM

## 2022-02-08 DIAGNOSIS — N75.0 BARTHOLIN CYST: ICD-10-CM

## 2022-02-08 DIAGNOSIS — N89.8 VAGINAL DISCHARGE: Primary | ICD-10-CM

## 2022-02-08 DIAGNOSIS — N89.8 VAGINAL ODOR: ICD-10-CM

## 2022-02-08 PROCEDURE — G8419 CALC BMI OUT NRM PARAM NOF/U: HCPCS | Performed by: OBSTETRICS & GYNECOLOGY

## 2022-02-08 PROCEDURE — 1036F TOBACCO NON-USER: CPT | Performed by: OBSTETRICS & GYNECOLOGY

## 2022-02-08 PROCEDURE — 99213 OFFICE O/P EST LOW 20 MIN: CPT | Performed by: OBSTETRICS & GYNECOLOGY

## 2022-02-08 PROCEDURE — G8427 DOCREV CUR MEDS BY ELIG CLIN: HCPCS | Performed by: OBSTETRICS & GYNECOLOGY

## 2022-02-08 PROCEDURE — G8484 FLU IMMUNIZE NO ADMIN: HCPCS | Performed by: OBSTETRICS & GYNECOLOGY

## 2022-02-08 RX ORDER — SULFAMETHOXAZOLE AND TRIMETHOPRIM 800; 160 MG/1; MG/1
1 TABLET ORAL 2 TIMES DAILY
Qty: 14 TABLET | Refills: 0 | Status: SHIPPED | OUTPATIENT
Start: 2022-02-08 | End: 2022-02-15

## 2022-02-08 RX ORDER — FLUCONAZOLE 150 MG/1
150 TABLET ORAL ONCE
Qty: 1 TABLET | Refills: 1 | Status: SHIPPED | OUTPATIENT
Start: 2022-02-08 | End: 2022-02-08

## 2022-02-08 NOTE — PROGRESS NOTES
[address] [place] [address]  Dept: [phone]  Dept Fax: [phone]  Loc: [phone]     2022    Meme Lloyd (:  1975) is a 52 y.o. female, here for evaluation of the following medical concerns:    Patient having some discharge with odor and some irritation. Patient with hx bartholin cyst surgery on left side-not sure what type of surgery. Review of Systems   Constitutional: Negative. HENT: Negative. Eyes: Negative. Respiratory: Negative. Cardiovascular: Negative. Gastrointestinal: Negative. Genitourinary: Positive for vaginal discharge. Musculoskeletal: Negative. Skin: Negative. Neurological: Negative. Psychiatric/Behavioral: Negative.       Date of Birth 1975  Past Medical History:   Diagnosis Date    Bacterial vaginosis     Bartholin's cyst     Bell's palsy     Fibroid     hx [place]    Necrotizing pancreatitis      Past Surgical History:   Procedure Laterality Date    BARTHOLIN GLAND CYST EXCISION       SECTION      DILATION AND CURETTAGE OF UTERUS       OB History    Para Term  AB Living   3 3 3     3   SAB IAB Ectopic Molar Multiple Live Births                    # Outcome Date GA Lbr Alexandr/2nd Weight Sex Delivery Anes PTL Lv   3 Term     M CS-LTranv      2 Term     M CS-LTranv      1 Term     M CS-LTranv        Social History     Socioeconomic History    Marital status: Single     Spouse name: Not on file    Number of children: Not on file    Years of education: Not on file    Highest education level: Not on file   Occupational History    Not on file   Tobacco Use    Smoking status: Former Smoker     Quit date: 2007     Years since quittin.6    Smokeless tobacco: Never Used   Vaping Use    Vaping Use: Never used   Substance and Sexual Activity    Alcohol use: No    Drug use: No    Sexual activity: Not Currently     Partners: Male   Other Topics Concern    Not on file   Social History Narrative    Not on file     Social Determinants of Health     Financial Resource Strain:     Difficulty of Paying Living Expenses: Not on file   Food Insecurity:     Worried About [address] in the Last Year: Not on file    Juan of Food in the Last Year: Not on file   Transportation Needs:     Lack of Transportation (Medical): Not on file    Lack of Transportation (Non-Medical):  Not on file   Physical Activity:     Days of Exercise per Week: Not on file    Minutes of Exercise per Session: Not on file   Stress:     Feeling of Stress : Not on file   Social Connections:     Frequency of Communication with Friends and Family: Not on file    Frequency of Social Gatherings with Friends and Family: Not on file    Attends Jew Services: Not on file    Active Member of [address] [place] [address] or Organizations: Not on file    Attends Club or Organization Meetings: Not on file    Marital Status: Not on file   Intimate Partner Violence:     Fear of Current or Ex-Partner: Not on file    Emotionally Abused: Not on file    Physically Abused: Not on file    Sexually Abused: Not on file   Housing Stability:     Unable to Pay for Housing in the Last Year: Not on file    Number of [place] in the Last Year: Not on file    Unstable Housing in the Last Year: Not on file     Allergies   Allergen Reactions    Shellfish-Derived Products Itching     Outpatient Medications Marked as Taking for the 22 encounter (Office Visit) with Екатерина Hernandez MD   Medication Sig Dispense Refill    sulfamethoxazole-trimethoprim (BACTRIM DS) 800-160 MG per tablet Take 1 tablet by mouth 2 times daily for 7 days 14 tablet 0    [] fluconazole (DIFLUCAN) 150 MG tablet Take 1 tablet by mouth once for 1 dose 1 tablet 1    polyethylene glycol (MIRALAX) powder Take 17 g by mouth 2 times daily as needed (constipation) 510 g 0     History used   Substance and Sexual Activity    Alcohol use: No    Drug use: No    Sexual activity: Not Currently     Partners: Male   Other Topics Concern    Not on file   Social History Narrative    Not on file     Social Determinants of Health     Financial Resource Strain:     Difficulty of Paying Living Expenses: Not on file   Food Insecurity:     Worried About Running Out of Food in the Last Year: Not on file    Juan of Food in the Last Year: Not on file   Transportation Needs:     Lack of Transportation (Medical): Not on file    Lack of Transportation (Non-Medical): Not on file   Physical Activity:     Days of Exercise per Week: Not on file    Minutes of Exercise per Session: Not on file   Stress:     Feeling of Stress : Not on file   Social Connections:     Frequency of Communication with Friends and Family: Not on file    Frequency of Social Gatherings with Friends and Family: Not on file    Attends Protestant Services: Not on file    Active Member of [address] [place] [address] or Organizations: Not on file    Attends Club or Organization Meetings: Not on file    Marital Status: Not on file   Intimate Partner Violence:     Fear of Current or Ex-Partner: Not on file    Emotionally Abused: Not on file    Physically Abused: Not on file    Sexually Abused: Not on file   Housing Stability:     Unable to Pay for Housing in the Last Year: Not on file    Number of [place] in the Last Year: Not on file    Unstable Housing in the Last Year: Not on file        History reviewed. No pertinent family history. Vitals:    02/08/22 1540   BP: 106/72   Site: Right Upper Arm   Position: Sitting   Cuff Size: Large Adult   Pulse: 76   Resp: 17   SpO2: 98%   Weight: 143 lb 9.6 oz (65.1 kg)   Height: 5' 3\" (1.6 m)     Estimated body mass index is 25.44 kg/m² as calculated from the following:    Height as of this encounter: 5' 3\" (1.6 m). Weight as of this encounter: 143 lb 9.6 oz (65.1 kg).     Physical Exam  Constitutional:       General: She is not in acute distress. Appearance: She is well-developed. She is not diaphoretic. HENT:      Head: Normocephalic. Eyes:      Pupils: Pupils are equal, round, and reactive to light. Abdominal:      General: There is no distension. Palpations: Abdomen is soft. There is no mass. Tenderness: There is no abdominal tenderness. There is no guarding or rebound. Genitourinary:     Labia:         Right: No rash, tenderness, lesion or injury. Left: Tenderness present. No rash, lesion or injury. Vagina: No signs of injury and foreign body. Vaginal discharge present. No erythema, tenderness or bleeding. Cervix: No cervical motion tenderness, discharge or friability. Uterus: Not deviated, not enlarged, not fixed and not tender. Adnexa:         Right: No mass, tenderness or fullness. Left: No mass, tenderness or fullness. Rectum: No external hemorrhoid. Comments: Normal urethral meatus, nl urethra, nl bladder. Irritation on left vagina-slight opening with some drainage and tenderness  Musculoskeletal:         General: No tenderness. Normal range of motion. Skin:     General: Skin is warm and dry. Coloration: Skin is not pale. Findings: No erythema or rash. Neurological:      Mental Status: She is alert and oriented to person, place, and time. Psychiatric:         Behavior: Behavior normal.         Thought Content: Thought content normal.         Judgment: Judgment normal.         ASSESSMENT/PLAN:  1. Vaginal discharge/odor-cultures  2. Bartholin cyst drainage-old scar with some fluid draining-bactrim  3.  Urine odor-UC

## 2022-02-09 LAB
BACTERIA WET PREP: NORMAL
CLUE CELLS: NORMAL
EPITHELIAL CELLS WET PREP: NORMAL
RBC WET PREP: NORMAL
SOURCE WET PREP: NORMAL
TRICHOMONAS PREP: NORMAL
WBC WET PREP: NORMAL
YEAST WET PREP: NORMAL

## 2022-02-10 LAB
GENITAL CULTURE, ROUTINE: ABNORMAL
GENITAL CULTURE, ROUTINE: ABNORMAL
GRAM STAIN RESULT: ABNORMAL
ORGANISM: ABNORMAL
URINE CULTURE, ROUTINE: ABNORMAL
WOUND/ABSCESS: ABNORMAL
WOUND/ABSCESS: ABNORMAL

## 2022-02-12 ASSESSMENT — ENCOUNTER SYMPTOMS
GASTROINTESTINAL NEGATIVE: 1
RESPIRATORY NEGATIVE: 1
EYES NEGATIVE: 1

## 2022-02-13 LAB
FINAL REPORT: NORMAL
PRELIMINARY: NORMAL

## 2022-02-25 DIAGNOSIS — A49.1 GROUP B STREPTOCOCCAL INFECTION: Primary | ICD-10-CM

## 2022-02-25 RX ORDER — AMPICILLIN 500 MG/1
500 CAPSULE ORAL 3 TIMES DAILY
Qty: 21 CAPSULE | Refills: 0 | OUTPATIENT
Start: 2022-02-25 | End: 2022-03-02 | Stop reason: SDUPTHER

## 2022-03-02 DIAGNOSIS — A49.1 GROUP B STREPTOCOCCAL INFECTION: ICD-10-CM

## 2022-03-02 RX ORDER — AMPICILLIN 500 MG/1
500 CAPSULE ORAL 3 TIMES DAILY
Qty: 21 CAPSULE | Refills: 0 | Status: SHIPPED | OUTPATIENT
Start: 2022-03-02 | End: 2022-03-09

## 2022-03-02 NOTE — TELEPHONE ENCOUNTER
Patient believes that she needs another round of antibiotics. Symptoms are clearing but not completely gone.     Patient can be reached at [facility] [address] [place] [address] 0998

## 2022-03-07 ENCOUNTER — TELEPHONE (OUTPATIENT)
Dept: GYNECOLOGY | Age: 47
End: 2022-03-07

## 2022-03-07 NOTE — TELEPHONE ENCOUNTER
Patient requesting an appointment. Says nothing has cleared up. Says she still has 5-7 pills left. Please advise.       Patient can be reached at [phone]

## 2022-03-18 ENCOUNTER — HOSPITAL ENCOUNTER (EMERGENCY)
Age: 47
Discharge: LEFT BEFORE DISCHARGE | End: 2022-03-18

## 2022-03-21 ENCOUNTER — HOSPITAL ENCOUNTER (EMERGENCY)
Age: 47
Discharge: HOME | End: 2022-03-21
Payer: COMMERCIAL

## 2022-03-21 VITALS
DIASTOLIC BLOOD PRESSURE: 75 MMHG | RESPIRATION RATE: 18 BRPM | BODY MASS INDEX: 23.79 KG/M2 | TEMPERATURE: 98 F | OXYGEN SATURATION: 100 % | HEART RATE: 60 BPM | SYSTOLIC BLOOD PRESSURE: 114 MMHG | WEIGHT: 134.3 LBS

## 2022-03-21 DIAGNOSIS — Z71.1 CONCERN ABOUT STD IN FEMALE WITHOUT DIAGNOSIS: Primary | ICD-10-CM

## 2022-03-21 DIAGNOSIS — N94.9 VAGINAL DISCOMFORT: ICD-10-CM

## 2022-03-21 LAB
BACTERIA WET PREP: NORMAL
BILIRUBIN URINE: NEGATIVE
BLOOD, URINE: NEGATIVE
CLARITY: CLEAR
CLUE CELLS: NORMAL
COLOR: YELLOW
EPITHELIAL CELLS WET PREP: NORMAL
EPITHELIAL CELLS, UA: 2 /HPF (ref 0–5)
GLUCOSE URINE: NEGATIVE MG/DL
HCG(URINE) PREGNANCY TEST: NEGATIVE
HYALINE CASTS: 0 /LPF (ref 0–8)
KETONES, URINE: NEGATIVE MG/DL
LEUKOCYTE ESTERASE, URINE: ABNORMAL
MICROSCOPIC EXAMINATION: YES
NITRITE, URINE: NEGATIVE
PH UA: 7 (ref 5–8)
PROTEIN UA: NEGATIVE MG/DL
RBC UA: 0 /HPF (ref 0–4)
RBC WET PREP: NORMAL
SOURCE WET PREP: NORMAL
SPECIFIC GRAVITY UA: 1.01 (ref 1–1.03)
TRICHOMONAS PREP: NORMAL
URINE REFLEX TO CULTURE: ABNORMAL
URINE TYPE: ABNORMAL
UROBILINOGEN, URINE: 0.2 E.U./DL
WBC UA: 2 /HPF (ref 0–5)
WBC WET PREP: NORMAL
YEAST WET PREP: NORMAL

## 2022-03-21 PROCEDURE — 87491 CHLMYD TRACH DNA AMP PROBE: CPT

## 2022-03-21 PROCEDURE — 6360000002 HC RX W HCPCS: Performed by: GENERAL ACUTE CARE HOSPITAL

## 2022-03-21 PROCEDURE — 84703 CHORIONIC GONADOTROPIN ASSAY: CPT

## 2022-03-21 PROCEDURE — 81001 URINALYSIS AUTO W/SCOPE: CPT

## 2022-03-21 PROCEDURE — 6370000000 HC RX 637 (ALT 250 FOR IP): Performed by: GENERAL ACUTE CARE HOSPITAL

## 2022-03-21 PROCEDURE — 87210 SMEAR WET MOUNT SALINE/INK: CPT

## 2022-03-21 PROCEDURE — 87591 N.GONORRHOEAE DNA AMP PROB: CPT

## 2022-03-21 PROCEDURE — 96365 THER/PROPH/DIAG IV INF INIT: CPT

## 2022-03-21 PROCEDURE — 2580000003 HC RX 258

## 2022-03-21 PROCEDURE — 99283 EMERGENCY DEPT VISIT LOW MDM: CPT

## 2022-03-21 RX ORDER — CYCLOSPORINE 0.5 MG/ML
EMULSION OPHTHALMIC
COMMUNITY
Start: 2022-01-25

## 2022-03-21 RX ORDER — VALACYCLOVIR HYDROCHLORIDE 500 MG/1
TABLET, FILM COATED ORAL
COMMUNITY
Start: 2022-02-22

## 2022-03-21 RX ORDER — CEFTRIAXONE 1 G/1
500 INJECTION, POWDER, FOR SOLUTION INTRAMUSCULAR; INTRAVENOUS ONCE
Status: COMPLETED | OUTPATIENT
Start: 2022-03-21 | End: 2022-03-21

## 2022-03-21 RX ORDER — DOXYCYCLINE HYCLATE 100 MG
100 TABLET ORAL ONCE
Status: COMPLETED | OUTPATIENT
Start: 2022-03-21 | End: 2022-03-21

## 2022-03-21 RX ORDER — DOXYCYCLINE HYCLATE 100 MG
100 TABLET ORAL 2 TIMES DAILY
Qty: 14 TABLET | Refills: 0 | Status: SHIPPED | OUTPATIENT
Start: 2022-03-21 | End: 2022-03-28

## 2022-03-21 RX ORDER — ACYCLOVIR 400 MG/1
TABLET ORAL
COMMUNITY
Start: 2022-02-22

## 2022-03-21 RX ADMIN — DOXYCYCLINE HYCLATE 100 MG: 100 TABLET, COATED ORAL at 11:03

## 2022-03-21 RX ADMIN — WATER 10 ML: 1 INJECTION INTRAMUSCULAR; INTRAVENOUS; SUBCUTANEOUS at 11:04

## 2022-03-21 RX ADMIN — CEFTRIAXONE SODIUM 500 MG: 1 INJECTION, POWDER, FOR SOLUTION INTRAMUSCULAR; INTRAVENOUS at 11:03

## 2022-03-21 ASSESSMENT — PAIN SCALES - GENERAL: PAINLEVEL_OUTOF10: 9

## 2022-03-21 ASSESSMENT — PAIN DESCRIPTION - PAIN TYPE: TYPE: ACUTE PAIN

## 2022-03-21 ASSESSMENT — PAIN DESCRIPTION - LOCATION: LOCATION: ABDOMEN

## 2022-03-21 ASSESSMENT — PAIN - FUNCTIONAL ASSESSMENT: PAIN_FUNCTIONAL_ASSESSMENT: 0-10

## 2022-03-21 NOTE — ED PROVIDER NOTES
[address]        Pt Name: Sean Faustin  MRN: [id number]  [place] 1975  Date of evaluation: 3/21/2022  Provider: MARISOL Cortez - CNP  PCP: Dontae Parnell MD  Note Started: 10:48 AM EDT       ERIN. I have evaluated this patient. My supervising physician was available for consultation. CHIEF COMPLAINT       Chief Complaint   Patient presents with    Exposure to STD     pt had new sexual partner, feels like she has contracted a std oral and vaginally. not feel well       HISTORY OF PRESENT ILLNESS   (Location, Timing/Onset, Context/Setting, Quality, Duration, Modifying Factors, Severity, Associated Signs and Symptoms)  Note limiting factors. Chief Complaint: Throat discomfort, vaginal discomfort, concern for STD    Sean Faustin is a 52 y.o. female who presents to the emergency department today reporting concern for possible STD. Patient states that she had a new sexual partner over the past week and states that over the last couple of days she has not felt well. She states that her throat is sore. She states that her vagina feels \"irritated\". She states that she has had vaginal discharge and vaginal odor as well. Patient with known history of genital herpes. She denies concern for outbreak at present. She denies any urinary symptoms. She denies fever, chills, other symptoms. Nursing Notes were all reviewed and agreed with or any disagreements were addressed in the HPI. REVIEW OF SYSTEMS    (2-9 systems for level 4, 10 or more for level 5)     Review of Systems   Constitutional: Negative for chills, fatigue and fever. HENT: Positive for mouth sores and sore throat. Negative for congestion. Eyes: Negative for visual disturbance. Respiratory: Negative for cough, chest tightness, shortness of breath and wheezing. Cardiovascular: Negative for chest pain and palpitations.    Gastrointestinal: Negative for abdominal pain, nausea and vomiting. Endocrine: Negative for polydipsia and polyuria. Genitourinary: Positive for genital sores and vaginal discharge. Negative for difficulty urinating, dysuria and flank pain. Reports history of genital herpes   Musculoskeletal: Negative for neck pain and neck stiffness. Skin: Negative for rash. Allergic/Immunologic: Negative for immunocompromised state. Neurological: Negative for dizziness, weakness, light-headedness and headaches. Hematological: Does not bruise/bleed easily. Psychiatric/Behavioral: Negative for suicidal ideas. Positives and Pertinent negatives as per HPI. Except as noted above in the ROS, all other systems were reviewed and negative. PAST MEDICAL HISTORY     Past Medical History:   Diagnosis Date    Bacterial vaginosis     Bartholin's cyst     Bell's palsy     Fibroid     hx [place]    Necrotizing pancreatitis          SURGICAL HISTORY     Past Surgical History:   Procedure Laterality Date    BARTHOLIN GLAND CYST EXCISION       SECTION      DILATION AND CURETTAGE OF UTERUS           [address]       Discharge Medication List as of 3/21/2022 11:08 AM      CONTINUE these medications which have NOT CHANGED    Details   acyclovir (ZOVIRAX) 400 MG tablet Historical Med      RESTASIS 0.05 % ophthalmic emulsion DAWHistorical Med      valACYclovir (VALTREX) 500 MG tablet Historical Med      naproxen (NAPROSYN) 500 MG tablet Take 1 tablet by mouth 2 times daily as needed for Pain, Disp-20 tablet, R-0Print      sodium polystyrene (SPS) 15 GM/60ML suspension Take 60 mLs by mouth daily for 3 days, Disp-180 mL,R-0Print      albuterol sulfate HFA (PROVENTIL HFA) 108 (90 Base) MCG/ACT inhaler Inhale 2 puffs into the lungs every 4 hours as needed for Wheezing or Shortness of Breath With spacer (and mask if indicated). Thanks. , Disp-1 Inhaler, R-0Print      polyethylene glycol (MIRALAX) powder Take 17 g by mouth 2 times daily as needed (constipation), Disp-510 g, R-0Print               ALLERGIES     Shellfish-derived products    FAMILYHISTORY     No family history on file. SOCIAL HISTORY       Social History     Tobacco Use    Smoking status: Former Smoker     Quit date: 2007     Years since quittin.7    Smokeless tobacco: Never Used   Vaping Use    Vaping Use: Never used   Substance Use Topics    Alcohol use: No    Drug use: No       SCREENINGS    Avril Coma Scale  Eye Opening: Spontaneous  Best Verbal Response: Oriented  Best Motor Response: Obeys commands  Avril Coma Scale Score: 15        PHYSICAL EXAM    (up to 7 for level 4, 8 or more for level 5)     ED Triage Vitals   BP Temp Temp Source Pulse Resp SpO2 Height Weight   22 0928 22 0928 22 0928 22 0928 22 0928 22 09 -- 22 09   114/75 98 °F (36.7 °C) Oral 60 18 100 %  134 lb 4.8 oz (60.9 kg)       Physical Exam  Vitals and nursing note reviewed. Exam conducted with a chaperone present. Constitutional:       General: She is not in acute distress. Appearance: Normal appearance. She is not ill-appearing, toxic-appearing or diaphoretic. HENT:      Head: Normocephalic and atraumatic. Right Ear: External ear normal.      Left Ear: External ear normal.      Nose: Nose normal.      Mouth/Throat:      Mouth: Mucous membranes are moist.      Pharynx: Oropharynx is clear. Eyes:      General:         Right eye: No discharge. Left eye: No discharge. Extraocular Movements: Extraocular movements intact. Conjunctiva/sclera: Conjunctivae normal.   Cardiovascular:      Rate and Rhythm: Normal rate and regular rhythm. Pulses: Normal pulses. Heart sounds: Normal heart sounds. Pulmonary:      Effort: Pulmonary effort is normal. No respiratory distress. Breath sounds: Normal breath sounds. Abdominal:      General: Bowel sounds are normal.      Palpations: Abdomen is soft. Tenderness: There is no abdominal tenderness. There is no right CVA tenderness or left CVA tenderness. Genitourinary:     General: Normal vulva. Vagina: No signs of injury and foreign body. Vaginal discharge, erythema and tenderness present. Cervix: Normal.      Uterus: Normal.       Adnexa: Right adnexa normal and left adnexa normal.      Rectum: Normal.   Musculoskeletal:         General: Normal range of motion. Cervical back: Normal range of motion and neck supple. No rigidity or tenderness. Skin:     General: Skin is warm and dry. Capillary Refill: Capillary refill takes less than 2 seconds. Neurological:      General: No focal deficit present. Mental Status: She is alert and oriented to person, place, and time. Psychiatric:         Mood and Affect: Mood normal.         Behavior: Behavior normal.         Thought Content: Thought content normal.         Judgment: Judgment normal.         DIAGNOSTIC RESULTS   LABS:    Labs Reviewed   URINALYSIS WITH REFLEX TO CULTURE - Abnormal; Notable for the following components:       Result Value    Leukocyte Esterase, Urine TRACE (*)     All other components within normal limits   WET PREP, GENITAL   C.TRACHOMATIS N.GONORRHOEAE DNA   PREGNANCY, URINE   MICROSCOPIC URINALYSIS       When ordered only abnormal lab results are displayed. All other labs were within normal range or not returned as of this dictation. EKG: When ordered, EKG's are interpreted by the Emergency Department Physician in the absence of a cardiologist.  Please see their note for interpretation of EKG. RADIOLOGY:   Non-plain film images such as CT, Ultrasound and MRI are read by the radiologist. Plain radiographic images are visualized and preliminarily interpreted by the ED Provider with the below findings:        Interpretation per the Radiologist below, if available at the time of this note:    No orders to display     No results found.         PROCEDURES   Unless otherwise noted below, none     Procedures    CRITICAL CARE TIME   none    CONSULTS:  None      EMERGENCY DEPARTMENT COURSE and DIFFERENTIAL DIAGNOSIS/MDM:   Vitals:    Vitals:    03/21/22 0926 03/21/22 0928   BP:  114/75   Pulse:  60   Resp:  18   Temp:  98 °F (36.7 °C)   TempSrc:  Oral   SpO2:  100%   Weight: 134 lb 4.8 oz (60.9 kg)        Patient was given the following medications:  Medications   cefTRIAXone (ROCEPHIN) injection 500 mg (500 mg IntraMUSCular Given 3/21/22 1103)   doxycycline hyclate (VIBRA-TABS) tablet 100 mg (100 mg Oral Given 3/21/22 1103)   sterile water injection (10 mLs  Given 3/21/22 1104)           Previous records reviewed in order to gain further information regarding patient's PMH as well as her HPI. Nursing notes reviewed. This a 80-year-old -American female who presents to the emergency department today requesting STD check. Patient reports having a new sexual partner over the past week and states that since having sexual intercourse both vaginally and orally she has not felt well. Physical exam complete. Patient is nontoxic, afebrile, normotensive. No signs or symptoms of acute distress noted. Pelvic exam as above. Wet prep is negative. Patient does request empiric STD treatment. Patient counseled on the importance of safe sex practices. She is encouraged to refrain from sexual intercourse for the next 14 days. She advised to notify her sexual partners so that they to may be treated. She agrees to follow-up with her GYN specialist as directed. She agrees return for high fever, incessant vomiting, severe pain, any other worsening symptoms. She is discharged home in stable condition. FINAL IMPRESSION      1. Concern about STD in female without diagnosis    2.  Vaginal discomfort          DISPOSITION/PLAN   DISPOSITION Decision To Discharge 03/21/2022 10:48:20 AM      PATIENT REFERRED TO:  Chandra Shone, MD  [facility]  [id number]  [address] [place] [address]  [phone]      As needed      DISCHARGE MEDICATIONS:  Discharge Medication List as of 3/21/2022 11:08 AM      START taking these medications    Details   doxycycline hyclate (VIBRA-TABS) 100 MG tablet Take 1 tablet by mouth 2 times daily for 7 days, Disp-14 tablet, R-0Normal             DISCONTINUED MEDICATIONS:  Discharge Medication List as of 3/21/2022 11:08 AM      STOP taking these medications       ibuprofen (ADVIL) 200 MG tablet Comments:   Reason for Stopping:                      (Please note that portions of this note were completed with a voice recognition program.  Efforts were made to edit the dictations but occasionally words are mis-transcribed.)    Finley Cooks, APRN - CNP (electronically signed)            Finley Cooks, APRN - CNP  03/23/22 8930

## 2022-03-21 NOTE — ED NOTES
Pt discharged home, verbalized discharge instructions. Ambulated independently to exit without difficulty.        Maria Esther Esquivel RN  03/21/22 4550

## 2022-03-22 LAB
C TRACH DNA GENITAL QL NAA+PROBE: NEGATIVE
N. GONORRHOEAE DNA: NEGATIVE

## 2022-03-23 ASSESSMENT — ENCOUNTER SYMPTOMS
NAUSEA: 0
ABDOMINAL PAIN: 0
SHORTNESS OF BREATH: 0
WHEEZING: 0
COUGH: 0
VOMITING: 0
CHEST TIGHTNESS: 0
SORE THROAT: 1

## 2022-03-30 RX ORDER — AMOXICILLIN AND CLAVULANATE POTASSIUM 875; 125 MG/1; MG/1
1 TABLET, FILM COATED ORAL 2 TIMES DAILY
Qty: 14 TABLET | Refills: 0 | Status: SHIPPED | OUTPATIENT
Start: 2022-03-30 | End: 2022-04-06

## 2022-03-30 NOTE — TELEPHONE ENCOUNTER
Patient has lingering symptoms. Symptoms: odor in urine and abdominal cramping on right side. Patient states that she might need another appointment or antibiotics. Please advise.      [facility] [address] [place] [address] 058

## 2022-04-07 ENCOUNTER — OFFICE VISIT (OUTPATIENT)
Dept: GYNECOLOGY | Age: 47
End: 2022-04-07
Payer: COMMERCIAL

## 2022-04-07 VITALS
HEART RATE: 64 BPM | HEIGHT: 63 IN | BODY MASS INDEX: 23.77 KG/M2 | RESPIRATION RATE: 17 BRPM | DIASTOLIC BLOOD PRESSURE: 58 MMHG | SYSTOLIC BLOOD PRESSURE: 110 MMHG | WEIGHT: 134.13 LBS

## 2022-04-07 DIAGNOSIS — R10.2 PELVIC PAIN: Primary | ICD-10-CM

## 2022-04-07 PROCEDURE — G8427 DOCREV CUR MEDS BY ELIG CLIN: HCPCS | Performed by: OBSTETRICS & GYNECOLOGY

## 2022-04-07 PROCEDURE — 1036F TOBACCO NON-USER: CPT | Performed by: OBSTETRICS & GYNECOLOGY

## 2022-04-07 PROCEDURE — G8420 CALC BMI NORM PARAMETERS: HCPCS | Performed by: OBSTETRICS & GYNECOLOGY

## 2022-04-07 PROCEDURE — 99213 OFFICE O/P EST LOW 20 MIN: CPT | Performed by: OBSTETRICS & GYNECOLOGY

## 2022-04-08 LAB — URINE CULTURE, ROUTINE: NORMAL

## 2022-04-10 RX ORDER — CONJUGATED ESTROGENS 0.62 MG/G
0.5 CREAM VAGINAL
Qty: 1 EACH | Refills: 1 | COMMUNITY
Start: 2022-04-11 | End: 2022-08-09

## 2022-04-10 ASSESSMENT — ENCOUNTER SYMPTOMS
RESPIRATORY NEGATIVE: 1
GASTROINTESTINAL NEGATIVE: 1
EYES NEGATIVE: 1

## 2022-04-11 NOTE — PROGRESS NOTES
[address] [place] [address]  Dept: [phone]  Dept Fax: [phone]  Loc: [phone]     2022    Jordan Primrose (:  1975) is a 52 y.o. female, here for evaluation of the following medical concerns:    Patient is having some vaginal irritation. Patient with some urinary discomfort. Feels better where bartholin cyst was. Patient with pelvic cramping. Vaginal Discharge    Gynecologic Exam        Review of Systems   Constitutional: Negative. HENT: Negative. Eyes: Negative. Respiratory: Negative. Cardiovascular: Negative. Gastrointestinal: Negative. Genitourinary: Positive for difficulty urinating and vaginal discharge. Musculoskeletal: Negative. Skin: Negative. Neurological: Negative. Psychiatric/Behavioral: Negative.       Date of Birth 1975  Past Medical History:   Diagnosis Date    Bacterial vaginosis     Bartholin's cyst     Bell's palsy     Fibroid     hx [place]    Necrotizing pancreatitis      Past Surgical History:   Procedure Laterality Date    BARTHOLIN GLAND CYST EXCISION       SECTION      DILATION AND CURETTAGE OF UTERUS       OB History    Para Term  AB Living   3 3 3     3   SAB IAB Ectopic Molar Multiple Live Births                    # Outcome Date GA Lbr Alexandr/2nd Weight Sex Delivery Anes PTL Lv   3 Term     M CS-LTranv      2 Term     M CS-LTranv      1 Term     M CS-LTranv        Social History     Socioeconomic History    Marital status: Single     Spouse name: Not on file    Number of children: Not on file    Years of education: Not on file    Highest education level: Not on file   Occupational History    Not on file   Tobacco Use    Smoking status: Former Smoker     Quit date: 2007     Years since quittin.8    Smokeless tobacco: Never Used   Vaping Use    Vaping Use: Never used   Substance and Sexual Activity    Alcohol use: No    Drug use: No    Sexual activity: Not Currently     Partners: Male   Other Topics Concern    Not on file   Social History Narrative    Not on file     Social Determinants of Health     Financial Resource Strain:     Difficulty of Paying Living Expenses: Not on file   Food Insecurity:     Worried About Running Out of Food in the Last Year: Not on file    Juan of Food in the Last Year: Not on file   Transportation Needs:     Lack of Transportation (Medical): Not on file    Lack of Transportation (Non-Medical): Not on file   Physical Activity:     Days of Exercise per Week: Not on file    Minutes of Exercise per Session: Not on file   Stress:     Feeling of Stress : Not on file   Social Connections:     Frequency of Communication with Friends and Family: Not on file    Frequency of Social Gatherings with Friends and Family: Not on file    Attends Restorationist Services: Not on file    Active Member of [address] [place] [address] or Organizations: Not on file    Attends Club or Organization Meetings: Not on file    Marital Status: Not on file   Intimate Partner Violence:     Fear of Current or Ex-Partner: Not on file    Emotionally Abused: Not on file    Physically Abused: Not on file    Sexually Abused: Not on file   Housing Stability:     Unable to Pay for Housing in the Last Year: Not on file    Number of [place] in the Last Year: Not on file    Unstable Housing in the Last Year: Not on file     Allergies   Allergen Reactions    Shellfish-Derived Products Itching     Outpatient Medications Marked as Taking for the 4/7/22 encounter (Office Visit) with Lori Martinez MD   Medication Sig Dispense Refill    acyclovir (ZOVIRAX) 400 MG tablet       RESTASIS 0.05 % ophthalmic emulsion       valACYclovir (VALTREX) 500 MG tablet       naproxen (NAPROSYN) 500 MG tablet Take 1 tablet by mouth 2 times daily as needed for Pain 20 tablet 0     History reviewed.  No pertinent family history. BP (!) 110/58 (Site: Right Upper Arm, Position: Sitting, Cuff Size: Medium Adult)   Pulse 64   Resp 17   Ht 5' 3\" (1.6 m)   Wt 134 lb 2 oz (60.8 kg)   BMI 23.76 kg/m²       Prior to Visit Medications    Medication Sig Taking? Authorizing Provider   acyclovir (ZOVIRAX) 400 MG tablet  Yes Historical Provider, MD   RESTASIS 0.05 % ophthalmic emulsion  Yes Historical Provider, MD   valACYclovir (VALTREX) 500 MG tablet  Yes Historical Provider, MD   naproxen (NAPROSYN) 500 MG tablet Take 1 tablet by mouth 2 times daily as needed for Pain Yes Trung Mcnair PA-C   sodium polystyrene (SPS) 15 GM/60ML suspension Take 60 mLs by mouth daily for 3 days  Coni Grant MD   albuterol sulfate HFA (PROVENTIL HFA) 108 (90 Base) MCG/ACT inhaler Inhale 2 puffs into the lungs every 4 hours as needed for Wheezing or Shortness of Breath With spacer (and mask if indicated). Thanks.   Trung Mcnair PA-C   polyethylene glycol (MIRALAX) powder Take 17 g by mouth 2 times daily as needed (constipation)  Patient not taking: Reported on 2022  Daniel Castellanos MD   ibuprofen (ADVIL) 200 MG tablet Take 3 tablets by mouth every 8 hours as needed for Pain  Daniel Castellanos MD        Allergies   Allergen Reactions    Shellfish-Derived Products Itching       Past Medical History:   Diagnosis Date    Bacterial vaginosis     Bartholin's cyst     Bell's palsy     Fibroid     hx [place]    Necrotizing pancreatitis        Past Surgical History:   Procedure Laterality Date    BARTHOLIN GLAND CYST EXCISION       SECTION      DILATION AND CURETTAGE OF UTERUS         Social History     Socioeconomic History    Marital status: Single     Spouse name: Not on file    Number of children: Not on file    Years of education: Not on file    Highest education level: Not on file   Occupational History    Not on file   Tobacco Use    Smoking status: Former Smoker     Quit date: 2007     Years since quittin.8    Smokeless tobacco: Never Used   Vaping Use    Vaping Use: Never used   Substance and Sexual Activity    Alcohol use: No    Drug use: No    Sexual activity: Not Currently     Partners: Male   Other Topics Concern    Not on file   Social History Narrative    Not on file     Social Determinants of Health     Financial Resource Strain:     Difficulty of Paying Living Expenses: Not on file   Food Insecurity:     Worried About Running Out of Food in the Last Year: Not on file    Juan of Food in the Last Year: Not on file   Transportation Needs:     Lack of Transportation (Medical): Not on file    Lack of Transportation (Non-Medical): Not on file   Physical Activity:     Days of Exercise per Week: Not on file    Minutes of Exercise per Session: Not on file   Stress:     Feeling of Stress : Not on file   Social Connections:     Frequency of Communication with Friends and Family: Not on file    Frequency of Social Gatherings with Friends and Family: Not on file    Attends Spiritism Services: Not on file    Active Member of [address] [place] [address] [employer] or Organizations: Not on file    Attends Club or Organization Meetings: Not on file    Marital Status: Not on file   Intimate Partner Violence:     Fear of Current or Ex-Partner: Not on file    Emotionally Abused: Not on file    Physically Abused: Not on file    Sexually Abused: Not on file   Housing Stability:     Unable to Pay for Housing in the Last Year: Not on file    Number of [place] in the Last Year: Not on file    Unstable Housing in the Last Year: Not on file        History reviewed. No pertinent family history. Vitals:    04/07/22 1104   BP: (!) 110/58   Site: Right Upper Arm   Position: Sitting   Cuff Size: Medium Adult   Pulse: 64   Resp: 17   Weight: 134 lb 2 oz (60.8 kg)   Height: 5' 3\" (1.6 m)     Estimated body mass index is 23.76 kg/m² as calculated from the following:    Height as of this encounter: 5' 3\" (1.6 m).     Weight as of this encounter: 134 lb 2 oz (60.8 kg). Physical Exam  Constitutional:       General: She is not in acute distress. Appearance: She is well-developed. She is not diaphoretic. HENT:      Head: Normocephalic. Eyes:      Pupils: Pupils are equal, round, and reactive to light. Abdominal:      General: There is no distension. Palpations: Abdomen is soft. There is no mass. Tenderness: There is no abdominal tenderness. There is no guarding or rebound. Genitourinary:     Labia:         Right: No rash, tenderness, lesion or injury. Left: No rash, tenderness, lesion or injury. Vagina: No signs of injury and foreign body. No vaginal discharge, erythema, tenderness or bleeding. Cervix: No cervical motion tenderness, discharge or friability. Uterus: Not deviated, not enlarged, not fixed and not tender. Adnexa:         Right: No mass, tenderness or fullness. Left: No mass, tenderness or fullness. Rectum: No external hemorrhoid. Comments: Normal urethral meatus, nl urethra, nl bladder. Musculoskeletal:         General: No tenderness. Normal range of motion. Skin:     General: Skin is warm and dry. Coloration: Skin is not pale. Findings: No erythema or rash. Neurological:      Mental Status: She is alert and oriented to person, place, and time. Psychiatric:         Behavior: Behavior normal.         Thought Content: Thought content normal.         Judgment: Judgment normal.         ASSESSMENT/PLAN:  1. Pelvic pain    - US PELVIS COMPLETE; Future  - US NON OB TRANSVAGINAL; Future  - Culture, Urine    2. Hx dysuria-UC  3. Vaginal dryness-try premarin cream-twice weekly.  Samples given

## 2022-04-13 RX ORDER — AMOXICILLIN AND CLAVULANATE POTASSIUM 875; 125 MG/1; MG/1
TABLET, FILM COATED ORAL
Qty: 14 TABLET | Refills: 0 | OUTPATIENT
Start: 2022-04-13

## 2022-04-28 ENCOUNTER — HOSPITAL ENCOUNTER (OUTPATIENT)
Dept: ULTRASOUND IMAGING | Age: 47
Discharge: HOME | End: 2022-04-28
Payer: COMMERCIAL

## 2022-04-28 DIAGNOSIS — R10.2 PELVIC PAIN: ICD-10-CM

## 2022-04-28 PROCEDURE — 76856 US EXAM PELVIC COMPLETE: CPT

## 2022-04-28 PROCEDURE — 76830 TRANSVAGINAL US NON-OB: CPT

## 2022-05-24 ENCOUNTER — HOSPITAL ENCOUNTER (EMERGENCY)
Age: 47
Discharge: HOME | End: 2022-05-24
Attending: EMERGENCY MEDICINE
Payer: COMMERCIAL

## 2022-05-24 VITALS
RESPIRATION RATE: 16 BRPM | TEMPERATURE: 97.7 F | OXYGEN SATURATION: 100 % | SYSTOLIC BLOOD PRESSURE: 121 MMHG | DIASTOLIC BLOOD PRESSURE: 76 MMHG | HEART RATE: 63 BPM

## 2022-05-24 DIAGNOSIS — S39.92XA LOWER BACK INJURY, INITIAL ENCOUNTER: Primary | ICD-10-CM

## 2022-05-24 PROCEDURE — 6360000002 HC RX W HCPCS: Performed by: EMERGENCY MEDICINE

## 2022-05-24 PROCEDURE — 6370000000 HC RX 637 (ALT 250 FOR IP): Performed by: EMERGENCY MEDICINE

## 2022-05-24 PROCEDURE — 96372 THER/PROPH/DIAG INJ SC/IM: CPT

## 2022-05-24 PROCEDURE — 99284 EMERGENCY DEPT VISIT MOD MDM: CPT

## 2022-05-24 RX ORDER — METHOCARBAMOL 750 MG/1
750-1500 TABLET, FILM COATED ORAL EVERY 8 HOURS PRN
Qty: 40 TABLET | Refills: 0 | Status: SHIPPED | OUTPATIENT
Start: 2022-05-24 | End: 2022-05-31 | Stop reason: ALTCHOICE

## 2022-05-24 RX ORDER — CYCLOBENZAPRINE HCL 10 MG
10 TABLET ORAL 3 TIMES DAILY PRN
Qty: 21 TABLET | Refills: 0 | Status: SHIPPED | OUTPATIENT
Start: 2022-05-24 | End: 2022-05-24

## 2022-05-24 RX ORDER — DEXAMETHASONE SODIUM PHOSPHATE 10 MG/ML
6 INJECTION, SOLUTION INTRAMUSCULAR; INTRAVENOUS ONCE
Status: COMPLETED | OUTPATIENT
Start: 2022-05-24 | End: 2022-05-24

## 2022-05-24 RX ORDER — KETOROLAC TROMETHAMINE 10 MG/1
10 TABLET, FILM COATED ORAL EVERY 6 HOURS PRN
Qty: 20 TABLET | Refills: 0 | Status: SHIPPED | OUTPATIENT
Start: 2022-05-24 | End: 2022-05-31 | Stop reason: ALTCHOICE

## 2022-05-24 RX ORDER — LIDOCAINE 4 G/G
1 PATCH TOPICAL DAILY
Status: DISCONTINUED | OUTPATIENT
Start: 2022-05-24 | End: 2022-05-24 | Stop reason: HOSPADM

## 2022-05-24 RX ORDER — LIDOCAINE 4 G/G
1 PATCH TOPICAL DAILY
Qty: 30 PATCH | Refills: 0 | Status: SHIPPED | OUTPATIENT
Start: 2022-05-24 | End: 2022-06-23

## 2022-05-24 RX ORDER — KETOROLAC TROMETHAMINE 30 MG/ML
30 INJECTION, SOLUTION INTRAMUSCULAR; INTRAVENOUS ONCE
Status: COMPLETED | OUTPATIENT
Start: 2022-05-24 | End: 2022-05-24

## 2022-05-24 RX ADMIN — DEXAMETHASONE SODIUM PHOSPHATE 6 MG: 10 INJECTION INTRAMUSCULAR; INTRAVENOUS at 10:32

## 2022-05-24 RX ADMIN — KETOROLAC TROMETHAMINE 30 MG: 30 INJECTION, SOLUTION INTRAMUSCULAR at 10:32

## 2022-05-24 ASSESSMENT — PAIN - FUNCTIONAL ASSESSMENT: PAIN_FUNCTIONAL_ASSESSMENT: 0-10

## 2022-05-24 ASSESSMENT — PAIN SCALES - GENERAL: PAINLEVEL_OUTOF10: 10

## 2022-05-24 ASSESSMENT — LIFESTYLE VARIABLES: HOW OFTEN DO YOU HAVE A DRINK CONTAINING ALCOHOL: NEVER

## 2022-05-24 NOTE — ED NOTES
Pt presents to triage requesting to speak with provider about questions on medications. Pt also asking to receive first dose of medications. Pt given first dose of medications per orders, questions answered by provider. Pt given new discharge instructions reflecting change in medications, all questions answered. Pt ambulated from ER without incident.      Gayla Gates RN  05/24/22 1638

## 2022-05-24 NOTE — ED PROVIDER NOTES
[address] PROVIDER NOTE    Patient Identification  Pt Name: Luisa Chaudhry  MRN: [id number]  [employer] 1975  Date of evaluation: 2022  Provider: Daniel Soto MD  PCP: Kelton Tabor MD    HPI  Luisa Chaudhry is a 52 y.o. female who presents to the ED for a back injury. She thinks she injured it lifting two 15 lb weights Friday while working out. She has pain in the left low back, radiating down the posterior and lateral left thigh. She denies saddle anesthesia, urinary retention, fecal incontinence, focal leg numbness, focal leg weakness, fever, or chills. No other complaints, modifying factors or associated symptoms. Nursing notes reviewed. Allergies: Shellfish-derived products  Past medical history:   Past Medical History:   Diagnosis Date    Bacterial vaginosis     Bartholin's cyst     Bell's palsy     Fibroid     hx [place]    Necrotizing pancreatitis      Past surgical history:   Past Surgical History:   Procedure Laterality Date    BARTHOLIN GLAND CYST EXCISION       SECTION      DILATION AND CURETTAGE OF UTERUS       Home medications:   Previous Medications    ACYCLOVIR (ZOVIRAX) 400 MG TABLET        ALBUTEROL SULFATE HFA (PROVENTIL HFA) 108 (90 BASE) MCG/ACT INHALER    Inhale 2 puffs into the lungs every 4 hours as needed for Wheezing or Shortness of Breath With spacer (and mask if indicated). Thanks. CONJUGATED ESTROGENS (PREMARIN) 0.625 MG/GM VAGINAL CREAM    Place 0.5 g vaginally Twice a Week    POLYETHYLENE GLYCOL (MIRALAX) POWDER    Take 17 g by mouth 2 times daily as needed (constipation)    RESTASIS 0.05 % OPHTHALMIC EMULSION        SODIUM POLYSTYRENE (SPS) 15 GM/60ML SUSPENSION    Take 60 mLs by mouth daily for 3 days    VALACYCLOVIR (VALTREX) 500 MG TABLET         Social history:  reports that she quit smoking about 14 years ago.  She has never used smokeless tobacco. She reports that she does not drink alcohol and does not use drugs. Family history:  History reviewed. No pertinent family history. REVIEW OF SYSTEMS  8 systems reviewed, pertinent positives per HPI otherwise noted to be negative    PHYSICAL EXAM  /76   Pulse 63   Temp 97.7 °F (36.5 °C)   Resp 16   SpO2 100%   GENERAL APPEARANCE: Awake and alert. Cooperative. No acute distress. HEAD: Normocephalic. Atraumatic. EYES: Anicteric sclera. EOM grossly intact. ENT: Mucous membranes are moist.   CV: Brisk capillary refill. LUNGS: Breathing is unlabored. Speaking comfortably in full sentences. EXTREMITIES: MAEE. No acute deformities. BACK: Tender to palpation in low back over the left sciatic notch. Nontender to palpation of the lumbar and thoracic spine. No palpable step-offs or deformities. SKIN: Warm and dry. NEUROLOGICAL: Alert and oriented. Normal sensation to light touch over the medial and lateral thighs, medial and lateral calves, and plantar and dorsal feet. Normal patellar reflexes bilaterally. Normal strength in hips, flexion and extension, knee flexion extension, and dorsal and plantar flexion. ED COURSE/MDM  Patient seen and evaluated. Given her normal lower extremity neurological exam and the location of her tenderness, sciatica secondary to low back strain is the likely cause of her symptoms. The risks of x-ray, CT, and MRI outweigh the benefit in this patient. I discussed plan and findings with Patient. I do feel patient can be safely discharged to home. Recommend follow up with PCP. Reasons to RT ED discussed, including saddle anesthesia, urinary retention, fecal incontinence, focal leg numbness, focal leg weakness. . Patient expresses understanding and is in agreement with plan. Patient Referrals:   Your PCP at [facility] an appointment as soon as possible for a visit in 3 days  for re-evaluation    [facility]  [address] [facility]  [phone]    As needed, If symptoms worsen or new symptoms develop      Discharge Medications:  New Prescriptions    CYCLOBENZAPRINE (FLEXERIL) 10 MG TABLET    Take 1 tablet by mouth 3 times daily as needed for Muscle spasms    KETOROLAC (TORADOL) 10 MG TABLET    Take 1 tablet by mouth every 6 hours as needed for Pain    LIDOCAINE 4 % EXTERNAL PATCH    Place 1 patch onto the skin daily       FINAL IMPRESSION  1. Lower back injury, initial encounter        Blood pressure 121/76, pulse 63, temperature 97.7 °F (36.5 °C), resp. rate 16, SpO2 100 %. DISPOSITION  Patient was discharged to home in good condition. This chart was generated using the [address] [place], [place] [address]Th  dictation system. I created this record but it may contain dictation errors given the limitations of this technology.        Teodora Steen MD  06/03/22 0107

## 2022-05-24 NOTE — ED NOTES
Pt discharged w/ prescriptions. Not given first dose in ER. Discharge instructions and prescriptions reviewed with pt. Pt allowed opportunity to ask questions and verbalized understanding.        Sebastian Benjamin) [facility]  05/24/22 9726

## 2022-05-24 NOTE — Clinical Note
Claudia Walker was seen and treated in our emergency department on 5/24/2022. She may return to work on 05/26/2022. If you have any questions or concerns, please don't hesitate to call.       Sinai Sy MD

## 2022-05-24 NOTE — Clinical Note
Ericka Owen was seen and treated in our emergency department on 5/24/2022. She may return to work on 05/26/2022. If you have any questions or concerns, please don't hesitate to call.       Cody Levine MD

## 2022-05-31 ENCOUNTER — OFFICE VISIT (OUTPATIENT)
Dept: ORTHOPEDIC SURGERY | Age: 47
End: 2022-05-31
Payer: COMMERCIAL

## 2022-05-31 VITALS — BODY MASS INDEX: 23.75 KG/M2 | WEIGHT: 134.04 LBS | HEIGHT: 63 IN

## 2022-05-31 DIAGNOSIS — R20.2 LEFT LEG PARESTHESIAS: ICD-10-CM

## 2022-05-31 DIAGNOSIS — M54.50 LOW BACK PAIN, UNSPECIFIED BACK PAIN LATERALITY, UNSPECIFIED CHRONICITY, UNSPECIFIED WHETHER SCIATICA PRESENT: ICD-10-CM

## 2022-05-31 DIAGNOSIS — S33.9XXA SPRAIN OF LIGAMENT OF LUMBOSACRAL JOINT, INITIAL ENCOUNTER: ICD-10-CM

## 2022-05-31 PROCEDURE — G8420 CALC BMI NORM PARAMETERS: HCPCS | Performed by: INTERNAL MEDICINE

## 2022-05-31 PROCEDURE — G8427 DOCREV CUR MEDS BY ELIG CLIN: HCPCS | Performed by: INTERNAL MEDICINE

## 2022-05-31 PROCEDURE — 1036F TOBACCO NON-USER: CPT | Performed by: INTERNAL MEDICINE

## 2022-05-31 PROCEDURE — 99204 OFFICE O/P NEW MOD 45 MIN: CPT | Performed by: INTERNAL MEDICINE

## 2022-05-31 RX ORDER — METHYLPREDNISOLONE 4 MG/1
TABLET ORAL
Qty: 1 KIT | Refills: 0 | Status: SHIPPED | OUTPATIENT
Start: 2022-05-31 | End: 2022-07-06 | Stop reason: ALTCHOICE

## 2022-05-31 RX ORDER — MELOXICAM 15 MG/1
15 TABLET ORAL DAILY
Qty: 30 TABLET | Refills: 2 | Status: SHIPPED | OUTPATIENT
Start: 2022-05-31 | End: 2022-07-06 | Stop reason: SDUPTHER

## 2022-05-31 RX ORDER — TIZANIDINE 4 MG/1
4 TABLET ORAL 3 TIMES DAILY PRN
Qty: 30 TABLET | Refills: 1 | Status: SHIPPED | OUTPATIENT
Start: 2022-05-31 | End: 2022-06-14

## 2022-05-31 NOTE — PROGRESS NOTES
Chief Complaint:   Chief Complaint   Patient presents with    Lower Back Pain     LUMBAR, started 9 days ago while working at [employer] and started having pain go from L hip to front of L leg/groin, hurts to stand too long, walking, or doing everyday activity, sitting too long also gives discomfort          History of Present Illness:       Patient is a 52 y.o. female presents with the above complaint. The symptoms began 5/23/2022  and started with an injury event occurred in the workplace at [employer] . The patient describes a aching, burning pain that does radiate. The symptoms are constant  and are are worsening since the onset. Was lifting a box from the floor estimated to weigh over 50 pounds wherein she experienced pain over the left side of her lower back. Continued working but the pain worsened over 24 hours prompting evaluation in the emergency room setting on 5/24/2022. She has not returned to work since that time. She developed the onset of left lower limb pain the day following the injury. She had no preceding back pain or limitations with ADLs or recreational activities related to her low back. The symptoms of back pain do notshow a discogenic provacative pattern and are worsened by standing and walking and improved with sitting. There is not new onset weakness or progressive weakness of the lower extremities that has developed. The patient denies new onset bowel or bladder dysfunction. There  is no history of previous spinal trauma. The patient does not have history or orthopaedic lumbar spine surgery. Pain localizes to the lumbar region axial and left paraxial    Painl levels: 10    There is lower limb pain . The back pain : limb pain is 50 : 50 left lower limb pain localized to the left extensor foreleg described as numbness in quality. Work-up to date has included: None  Prior treatment has included NSAID-Naprosyn, Robaxin and Lidoderm.  This patient reports some improvement with this treatment. The patient has no history or autoimmune disease, inflammatory arthropathy or crystal arthropathy. .      Past Medical History:        Past Medical History:   Diagnosis Date    Bacterial vaginosis     Bartholin's cyst     Bell's palsy     Fibroid     hx [place]    Necrotizing pancreatitis          Past Surgical History:   Procedure Laterality Date    BARTHOLIN GLAND CYST EXCISION       SECTION      DILATION AND CURETTAGE OF UTERUS           Present Medications:         Current Outpatient Medications   Medication Sig Dispense Refill    methylPREDNISolone (MEDROL, MIRIAM,) 4 MG tablet By mouth. 1 kit 0    meloxicam (MOBIC) 15 MG tablet Take 1 tablet by mouth daily Start after completing Medrol. 30 tablet 2    tiZANidine (ZANAFLEX) 4 MG tablet Take 1 tablet by mouth 3 times daily as needed (Muscle tightness/spasm) 30 tablet 1    lidocaine 4 % external patch Place 1 patch onto the skin daily 30 patch 0    conjugated estrogens (PREMARIN) 0.625 MG/GM vaginal cream Place 0.5 g vaginally Twice a Week 1 each 1    acyclovir (ZOVIRAX) 400 MG tablet       RESTASIS 0.05 % ophthalmic emulsion       valACYclovir (VALTREX) 500 MG tablet       sodium polystyrene (SPS) 15 GM/60ML suspension Take 60 mLs by mouth daily for 3 days 180 mL 0    albuterol sulfate HFA (PROVENTIL HFA) 108 (90 Base) MCG/ACT inhaler Inhale 2 puffs into the lungs every 4 hours as needed for Wheezing or Shortness of Breath With spacer (and mask if indicated). Thanks. 1 Inhaler 0    polyethylene glycol (MIRALAX) powder Take 17 g by mouth 2 times daily as needed (constipation) (Patient not taking: Reported on 2022) 510 g 0     No current facility-administered medications for this visit. Allergies:         Allergies   Allergen Reactions    Shellfish-Derived Products Itching        Social History:         Social History     Socioeconomic History    Marital status: Single     Spouse name: Not on file    Number of children: Not on file    Years of education: Not on file    Highest education level: Not on file   Occupational History    Not on file   Tobacco Use    Smoking status: Former Smoker     Quit date: 2007     Years since quittin.9    Smokeless tobacco: Never Used   Vaping Use    Vaping Use: Never used   Substance and Sexual Activity    Alcohol use: No    Drug use: No    Sexual activity: Not Currently     Partners: Male   Other Topics Concern    Not on file   Social History Narrative    Not on file     Social Determinants of Health     Financial Resource Strain:     Difficulty of Paying Living Expenses: Not on file   Food Insecurity:     Worried About Running Out of Food in the Last Year: Not on file    Juan of Food in the Last Year: Not on file   Transportation Needs:     Lack of Transportation (Medical): Not on file    Lack of Transportation (Non-Medical):  Not on file   Physical Activity:     Days of Exercise per Week: Not on file    Minutes of Exercise per Session: Not on file   Stress:     Feeling of Stress : Not on file   Social Connections:     Frequency of Communication with Friends and Family: Not on file    Frequency of Social Gatherings with Friends and Family: Not on file    Attends Congregational Services: Not on file    Active Member of [address] [place] [address] or Organizations: Not on file    Attends Club or Organization Meetings: Not on file    Marital Status: Not on file   Intimate Partner Violence:     Fear of Current or Ex-Partner: Not on file    Emotionally Abused: Not on file    Physically Abused: Not on file    Sexually Abused: Not on file   Housing Stability:     Unable to Pay for Housing in the Last Year: Not on file    Number of [place] in the Last Year: Not on file    Unstable Housing in the Last Year: Not on file        Review of Symptoms:    Pertinent items are noted in HPI    Review of systems reviewed from Patient History Form dated on    and   available in the patient's chart under the Media tab. Vital Signs: There were no vitals filed for this visit. General Exam:     Constitutional: Patient is adequately groomed with no evidence of malnutrition  Mental Status: The patient is oriented to time, place and person. The patient's mood and affect are appropriate. Vascular: Examination reveals no swelling or calf tenderness. Peripheral pulses are palpable and 2+. Lymphatics: no lymphadenopathy of the inguinal region or lower extremity      Physical Exam: lower back      Primary Exam:    Inspection: Atrophy appreciable curvature      Palpation: No focal trigger point tenderness      Range of Motion: 80/10 pain with flexion      Strength: Normal lower extremity      Special Tests: Negative SLR      Skin: There are no rashes, ulcerations or lesions. Gait: Nonantalgic      Reflex asymmetric decrease at the knee on left as compared to right     Additional Comments:        Additional Examinations:          Neurolgic -Light touch sensation and manual muscle testing normal L2-S1. No fasiculations. Pattella tendon reflex +2 on the right +1 on the left and Achilles tendon reflexes +2 bilaterally. Office Imaging Results/Procedures PerformedToday:      Radiology:      X-rays obtained and reviewed in office:   Views 3 views lumbar spine   Location lumbar spine   Impression normal alignment on the AP projection there is hemilumbarization of the S1 vertebral segment on the left. SI joints have normal radiographic appearance lateral projection reveals rudimentary disc at S1-S2 no other areas of focal events intervertebral disc space narrowing mild facet arthrosis at L5-S1.        Office Procedures:     Orders Placed This Encounter   Procedures    XR LUMBAR SPINE (2-3 VIEWS)     Standing Status:   Future     Number of Occurrences:   1     Standing Expiration Date:   5/26/2023     Order Specific Question:   Reason for exam:     Answer:   pain Other Outside Imaging and Testing Personally Reviewed:    No results found. Assessment   Impression: . Encounter Diagnoses   Name Primary?  Sprain of ligament of lumbosacral joint, initial encounter     Left leg paresthesias     Low back pain, unspecified back pain laterality, unspecified chronicity, unspecified whether sciatica present               Plan: Activity modification, lumbar spine precautions  lumbar spinestabilization home exercise program  Medical pain management: muscle relaxant: Zanaflex for Robaxin, medrol followed by meloxicam with GI precaution discontinue Naprosyn  MRI evaluation if symptoms worsen for show no improvement  Remain off work until 6/4/2022 at which time she can return w/ restrictions documented in the medical record  Monitor left patellar tendon reflex on follow-up    Approximately  45 minutes was spent related to previewing pertinent medical documentation prior to the patient's visit along with counseling during the patient's visit with respect to treatment options inclusive of alternatives to treatment and the complications and risks related to those treatment options along with expectations of outcome related to those treatments and inclusive of time in the documentation and ordering of testing and treatment after the visit. The nature of the finding, probable diagnosis and likely treatment was thoroughly discussed with the patient. The options, risks, complications, alternative treatment as well as some of the differential diagnosis was discussed. The patient was thoroughly informed and all questions were answered. the patient indicated understanding and satisfaction with the discussion.       Orders:        Orders Placed This Encounter   Procedures    XR LUMBAR SPINE (2-3 VIEWS)     Standing Status:   Future     Number of Occurrences:   1     Standing Expiration Date:   5/26/2023     Order Specific Question:   Reason for exam:     Answer: pain           Disclaimer: \"This note was dictated with voice recognition software. Though review and correction are routine, we apologize for any errors. \"

## 2022-05-31 NOTE — LETTER
[address]  [address] [facility] [address]  Phone: [phone]  Fax: [phone]    Isauro Henry MD        May 31, 2022     Patient: Gustavo Tenorio   YOB: 1975   Date of Visit: 5/31/2022       To Whom It May Concern: It is my medical opinion that Joseph Petty Should remain off work from 5/26/2022 through Amy 3, 2022. Penny Dawson She will return to work with restrictions on June 4, 2022. Light duty restrictions recommended at this time include:    No repetitive bending or stooping  No lifting from floor to waist  Carrying limited to 20 pounds with use of both arms position close to body  Pushing and pulling limited to 10 pounds occasional frequency  Allow transition from sitting to standing as needed for symptom control  These restrictions are in effect for the next 3 weeks      If you have any questions or concerns, please don't hesitate to call.     Sincerely,      Ebenezer Potter MD.    Isauro Henry MD

## 2022-06-02 ENCOUNTER — HOSPITAL ENCOUNTER (EMERGENCY)
Age: 47
Discharge: HOME | End: 2022-06-02
Payer: COMMERCIAL

## 2022-06-02 VITALS
WEIGHT: 140 LBS | OXYGEN SATURATION: 100 % | SYSTOLIC BLOOD PRESSURE: 111 MMHG | HEART RATE: 84 BPM | BODY MASS INDEX: 24.8 KG/M2 | TEMPERATURE: 97.7 F | DIASTOLIC BLOOD PRESSURE: 87 MMHG | HEIGHT: 63 IN | RESPIRATION RATE: 16 BRPM

## 2022-06-02 DIAGNOSIS — S39.012A BACK STRAIN, INITIAL ENCOUNTER: Primary | ICD-10-CM

## 2022-06-02 DIAGNOSIS — M25.552 HIP PAIN, ACUTE, LEFT: ICD-10-CM

## 2022-06-02 PROCEDURE — 99283 EMERGENCY DEPT VISIT LOW MDM: CPT

## 2022-06-02 RX ORDER — IBUPROFEN 800 MG/1
800 TABLET ORAL EVERY 8 HOURS PRN
Qty: 30 TABLET | Refills: 0 | Status: SHIPPED | OUTPATIENT
Start: 2022-06-02 | End: 2022-07-06 | Stop reason: ALTCHOICE

## 2022-06-02 ASSESSMENT — LIFESTYLE VARIABLES: HOW OFTEN DO YOU HAVE A DRINK CONTAINING ALCOHOL: NEVER

## 2022-06-02 NOTE — Clinical Note
Vicky Tolbert was seen and treated in our emergency department on 6/2/2022. She may return to work on 06/05/2022. If you have any questions or concerns, please don't hesitate to call.       LUIS DANIEL Myrick

## 2022-06-02 NOTE — ED PROVIDER NOTES
[address] [facility]        Pt Name: Saul Mcpherson  MRN: [id number]  [place] 1975  Date of evaluation: 6/2/2022  Provider: LUIS DANIEL Myrick  PCP: Clary Nice MD  Note Started: 11:18 AM EDT       ERIN. I have evaluated this patient. My supervising physician was available for consultation. CHIEF COMPLAINT       Chief Complaint   Patient presents with    Back Pain     back pain into thigh muscle, now all the way down L leg. appt to see ortho in 2 weeks       HISTORY OF PRESENT ILLNESS   (Location, Timing/Onset, Context/Setting, Quality, Duration, Modifying Factors, Severity, Associated Signs and Symptoms)  Note limiting factors. Chief Complaint: Left lower back left hip pain    Saul Mcpherson is a 52 y.o. female who presents with lower back and hip pain for about 12 days. Patient has been here in the emergency department and did see orthopedic provider yesterday. Patient states that the orthopedic provider did an x-ray of her pelvic and hip area which did not show any acute abnormalities per the patient. Patient was given steroid medication at that time and another medication that he cannot remember. Patient was told to follow-up with orthopedics in 1 to 2 weeks for recheck. When patient was here in the emergency department she was also given pain medications and muscle relaxants which have been minimally effective. Patient has not been taking ibuprofen as scheduled every 8 hours we discussed this today. Also discussed about seeing physical therapy for this injury. Patient states that symptoms began when she was lifting heavy boxes at her [employer] job causing a strain in her back that is radiate down to her left hip and left thigh. Patient denies any fevers, chills, nausea, vomiting or abdominal pain. Patient denies any spinal manipulation or IV drug use.     Nursing Notes were all reviewed and agreed with or any disagreements were addressed in the HPI. REVIEW OF SYSTEMS    (2-9 systems for level 4, 10 or more for level 5)     Review of Systems    Positives and Pertinent negatives as per HPI. Except as noted above in the ROS, all other systems were reviewed and negative. PAST MEDICAL HISTORY     Past Medical History:   Diagnosis Date    Bacterial vaginosis     Bartholin's cyst     Bell's palsy     Fibroid     hx [place]    Necrotizing pancreatitis          SURGICAL HISTORY     Past Surgical History:   Procedure Laterality Date    BARTHOLIN GLAND CYST EXCISION       SECTION      DILATION AND CURETTAGE OF UTERUS           CURRENTMEDICATIONS       Previous Medications    ACYCLOVIR (ZOVIRAX) 400 MG TABLET        ALBUTEROL SULFATE HFA (PROVENTIL HFA) 108 (90 BASE) MCG/ACT INHALER    Inhale 2 puffs into the lungs every 4 hours as needed for Wheezing or Shortness of Breath With spacer (and mask if indicated). Thanks. CONJUGATED ESTROGENS (PREMARIN) 0.625 MG/GM VAGINAL CREAM    Place 0.5 g vaginally Twice a Week    LIDOCAINE 4 % EXTERNAL PATCH    Place 1 patch onto the skin daily    MELOXICAM (MOBIC) 15 MG TABLET    Take 1 tablet by mouth daily Start after completing Medrol. METHYLPREDNISOLONE (MEDROL, MIRIAM,) 4 MG TABLET    By mouth. POLYETHYLENE GLYCOL (MIRALAX) POWDER    Take 17 g by mouth 2 times daily as needed (constipation)    RESTASIS 0.05 % OPHTHALMIC EMULSION        SODIUM POLYSTYRENE (SPS) 15 GM/60ML SUSPENSION    Take 60 mLs by mouth daily for 3 days    TIZANIDINE (ZANAFLEX) 4 MG TABLET    Take 1 tablet by mouth 3 times daily as needed (Muscle tightness/spasm)    VALACYCLOVIR (VALTREX) 500 MG TABLET             ALLERGIES     Shellfish-derived products    FAMILYHISTORY     History reviewed. No pertinent family history.        SOCIAL HISTORY       Social History     Tobacco Use    Smoking status: Former Smoker     Quit date: 2007     Years since quittin.9    Smokeless tobacco: Never Gait: Gait is intact. Deep Tendon Reflexes:      Reflex Scores:       Patellar reflexes are 2+ on the right side and 2+ on the left side. Psychiatric:         Mood and Affect: Mood normal.         Behavior: Behavior normal.         DIAGNOSTIC RESULTS   LABS:    Labs Reviewed - No data to display    When ordered only abnormal lab results are displayed. All other labs were within normal range or not returned as of this dictation. EKG: When ordered, EKG's are interpreted by the Emergency Department Physician in the absence of a cardiologist.  Please see their note for interpretation of EKG. RADIOLOGY:   Non-plain film images such as CT, Ultrasound and MRI are read by the radiologist. Plain radiographic images are visualized and preliminarily interpreted by the ED Provider with the below findings:        Interpretation per the Radiologist below, if available at the time of this note:    No orders to display     XR LUMBAR SPINE (2-3 VIEWS)    Result Date: 5/31/2022  Radiology exam is complete. No Radiologist dictation. Please follow up with ordering provider. PROCEDURES   Unless otherwise noted below, none     Procedures    CRITICAL CARE TIME       CONSULTS:  None      EMERGENCY DEPARTMENT COURSE and DIFFERENTIAL DIAGNOSIS/MDM:   Vitals:    Vitals:    06/02/22 1058   BP: 111/87   Pulse: 84   Resp: 16   Temp: 97.7 °F (36.5 °C)   SpO2: 100%   Weight: 140 lb (63.5 kg)   Height: 5' 3\" (1.6 m)       Patient was given the following medications:  Medications - No data to display      Is this patient to be included in the SEP-1 Core Measure due to severe sepsis or septic shock? No   Exclusion criteria - the patient is NOT to be included for SEP-1 Core Measure due to: Infection is not suspected     80-year-old female presents with left lower back pain that radiates to the left hip and left thigh. Patient has been evaluated by orthopedics yesterday and was given medications with steroids.   Patient states that occasions that was given to her in the emergency department on May 24 have not been as helpful. Patient denies any fevers or chills. Patient states that this all started when she lifted a heavy box at work. Today she has a otherwise benign examination with mild tenderness to the paraspinal muscles over the lower back. Patient has a normal gait and normal sensation to light touch bilaterally lower legs. We discussed about follow-up with physical therapy as this can help with musculoskeletal injuries and she is willing to take the referral but is not ready to set up an appointment yet. I completed a structured, evidence-based clinical evaluation to screen for acute non-traumatic spinal emergencies. The patient has a normal detailed neurologic exam and a low red flag score. The evidence indicates that the patient is very low risk for an acute spinal emergency and this is consistent with my clinical intuition. The risk of further workup is higher than the likelihood of the patient having a spinal epidural abscess or other dangerous emergency spinal condition. It is, therefore, in the patients best interest not to do additional emergent testing at this time. FINAL IMPRESSION      1. Back strain, initial encounter    2.  Hip pain, acute, left          DISPOSITION/PLAN   DISPOSITION Decision To Discharge 06/02/2022 11:16:44 AM      PATIENT REFERRED TO:  Lonie Sandifer, MD  [address] [place], [place] [id number] [id number]    Schedule an appointment as soon as possible for a visit in 1 week  recheck    [facility] Emergency Department  [address] [facility]  [phone]    As needed, If symptoms worsen    [facility]  [address]  Schedule an appointment as soon as possible for a visit in 3 days  recheck      DISCHARGE MEDICATIONS:  New Prescriptions    IBUPROFEN (ADVIL;MOTRIN) 800 MG TABLET    Take 1 tablet by mouth every 8 hours as needed for Pain       DISCONTINUED MEDICATIONS:  Discontinued Medications    No medications on file              (Please note that portions of this note were completed with a voice recognition program.  Efforts were made to edit the dictations but occasionally words are mis-transcribed.)    LUIS DANIEL Byers (electronically signed)            LUIS DANIEL Byers  06/02/22 114

## 2022-06-02 NOTE — Clinical Note
Keri Kem was seen and treated in our emergency department on 6/2/2022. She may return to work on 06/05/2022. If you have any questions or concerns, please don't hesitate to call.       LUIS DANIEL Garcia

## 2022-06-04 ENCOUNTER — HOSPITAL ENCOUNTER (EMERGENCY)
Age: 47
Discharge: HOME | End: 2022-06-04
Attending: EMERGENCY MEDICINE
Payer: COMMERCIAL

## 2022-06-04 ENCOUNTER — APPOINTMENT (OUTPATIENT)
Dept: CT IMAGING | Age: 47
End: 2022-06-04
Payer: COMMERCIAL

## 2022-06-04 VITALS
BODY MASS INDEX: 24.45 KG/M2 | WEIGHT: 138 LBS | RESPIRATION RATE: 20 BRPM | SYSTOLIC BLOOD PRESSURE: 112 MMHG | DIASTOLIC BLOOD PRESSURE: 59 MMHG | HEIGHT: 63 IN | TEMPERATURE: 97.7 F | OXYGEN SATURATION: 100 % | HEART RATE: 76 BPM

## 2022-06-04 DIAGNOSIS — R10.32 LEFT LOWER QUADRANT ABDOMINAL PAIN: Primary | ICD-10-CM

## 2022-06-04 DIAGNOSIS — K59.00 CONSTIPATION, UNSPECIFIED CONSTIPATION TYPE: ICD-10-CM

## 2022-06-04 DIAGNOSIS — M54.16 LUMBAR RADICULAR PAIN: ICD-10-CM

## 2022-06-04 LAB
ANION GAP SERPL CALCULATED.3IONS-SCNC: 7 MMOL/L (ref 3–16)
BACTERIA: NORMAL /HPF
BASOPHILS ABSOLUTE: 0.1 K/UL (ref 0–0.2)
BASOPHILS RELATIVE PERCENT: 0.8 %
BILIRUBIN URINE: NEGATIVE
BLOOD, URINE: NEGATIVE
BUN BLDV-MCNC: 28 MG/DL (ref 7–20)
CALCIUM SERPL-MCNC: 9.4 MG/DL (ref 8.3–10.6)
CHLORIDE BLD-SCNC: 104 MMOL/L (ref 99–110)
CLARITY: CLEAR
CO2: 28 MMOL/L (ref 21–32)
COLOR: YELLOW
CREAT SERPL-MCNC: 0.7 MG/DL (ref 0.6–1.1)
EOSINOPHILS ABSOLUTE: 0.2 K/UL (ref 0–0.6)
EOSINOPHILS RELATIVE PERCENT: 2.5 %
EPITHELIAL CELLS, UA: 0 /HPF (ref 0–5)
GFR AFRICAN AMERICAN: >60
GFR NON-AFRICAN AMERICAN: >60
GLUCOSE BLD-MCNC: 74 MG/DL (ref 70–99)
GLUCOSE URINE: NEGATIVE MG/DL
HCG QUALITATIVE: NEGATIVE
HCT VFR BLD CALC: 42.1 % (ref 36–48)
HEMOGLOBIN: 13.7 G/DL (ref 12–16)
HYALINE CASTS: 0 /LPF (ref 0–8)
KETONES, URINE: NEGATIVE MG/DL
LEUKOCYTE ESTERASE, URINE: NEGATIVE
LYMPHOCYTES ABSOLUTE: 0.6 K/UL (ref 1–5.1)
LYMPHOCYTES RELATIVE PERCENT: 9.4 %
MCH RBC QN AUTO: 28.8 PG (ref 26–34)
MCHC RBC AUTO-ENTMCNC: 32.6 G/DL (ref 31–36)
MCV RBC AUTO: 88.4 FL (ref 80–100)
MICROSCOPIC EXAMINATION: YES
MONOCYTES ABSOLUTE: 0.4 K/UL (ref 0–1.3)
MONOCYTES RELATIVE PERCENT: 5.6 %
NEUTROPHILS ABSOLUTE: 5.2 K/UL (ref 1.7–7.7)
NEUTROPHILS RELATIVE PERCENT: 81.7 %
NITRITE, URINE: NEGATIVE
PDW BLD-RTO: 14.6 % (ref 12.4–15.4)
PH UA: >=9 (ref 5–8)
PLATELET # BLD: 195 K/UL (ref 135–450)
PMV BLD AUTO: 8 FL (ref 5–10.5)
POTASSIUM REFLEX MAGNESIUM: 4.6 MMOL/L (ref 3.5–5.1)
PROTEIN UA: 30 MG/DL
RBC # BLD: 4.76 M/UL (ref 4–5.2)
RBC UA: 0 /HPF (ref 0–4)
SEDIMENTATION RATE, ERYTHROCYTE: 9 MM/HR (ref 0–20)
SODIUM BLD-SCNC: 139 MMOL/L (ref 136–145)
SPECIFIC GRAVITY UA: 1.02 (ref 1–1.03)
URINE REFLEX TO CULTURE: ABNORMAL
URINE TYPE: ABNORMAL
UROBILINOGEN, URINE: 1 E.U./DL
WBC # BLD: 6.4 K/UL (ref 4–11)
WBC UA: 0 /HPF (ref 0–5)

## 2022-06-04 PROCEDURE — 81001 URINALYSIS AUTO W/SCOPE: CPT

## 2022-06-04 PROCEDURE — 96375 TX/PRO/DX INJ NEW DRUG ADDON: CPT

## 2022-06-04 PROCEDURE — 6370000000 HC RX 637 (ALT 250 FOR IP): Performed by: EMERGENCY MEDICINE

## 2022-06-04 PROCEDURE — 84703 CHORIONIC GONADOTROPIN ASSAY: CPT

## 2022-06-04 PROCEDURE — 85025 COMPLETE CBC W/AUTO DIFF WBC: CPT

## 2022-06-04 PROCEDURE — 74176 CT ABD & PELVIS W/O CONTRAST: CPT

## 2022-06-04 PROCEDURE — 6360000002 HC RX W HCPCS: Performed by: EMERGENCY MEDICINE

## 2022-06-04 PROCEDURE — 36415 COLL VENOUS BLD VENIPUNCTURE: CPT

## 2022-06-04 PROCEDURE — 99284 EMERGENCY DEPT VISIT MOD MDM: CPT

## 2022-06-04 PROCEDURE — 96374 THER/PROPH/DIAG INJ IV PUSH: CPT

## 2022-06-04 PROCEDURE — 80048 BASIC METABOLIC PNL TOTAL CA: CPT

## 2022-06-04 PROCEDURE — 85652 RBC SED RATE AUTOMATED: CPT

## 2022-06-04 RX ORDER — KETOROLAC TROMETHAMINE 30 MG/ML
15 INJECTION, SOLUTION INTRAMUSCULAR; INTRAVENOUS ONCE
Status: COMPLETED | OUTPATIENT
Start: 2022-06-04 | End: 2022-06-04

## 2022-06-04 RX ORDER — ACETAMINOPHEN 500 MG
1000 TABLET ORAL ONCE
Status: COMPLETED | OUTPATIENT
Start: 2022-06-04 | End: 2022-06-04

## 2022-06-04 RX ORDER — ORPHENADRINE CITRATE 30 MG/ML
60 INJECTION INTRAMUSCULAR; INTRAVENOUS ONCE
Status: COMPLETED | OUTPATIENT
Start: 2022-06-04 | End: 2022-06-04

## 2022-06-04 RX ORDER — HYDROCODONE BITARTRATE AND ACETAMINOPHEN 5; 325 MG/1; MG/1
1 TABLET ORAL EVERY 4 HOURS PRN
Qty: 10 TABLET | Refills: 0 | Status: SHIPPED | OUTPATIENT
Start: 2022-06-04 | End: 2022-06-07

## 2022-06-04 RX ORDER — DOCUSATE SODIUM 100 MG/1
100 CAPSULE, LIQUID FILLED ORAL 2 TIMES DAILY
Qty: 60 CAPSULE | Refills: 0 | Status: SHIPPED | OUTPATIENT
Start: 2022-06-04 | End: 2022-07-04

## 2022-06-04 RX ORDER — DEXAMETHASONE SODIUM PHOSPHATE 10 MG/ML
10 INJECTION, SOLUTION INTRAMUSCULAR; INTRAVENOUS ONCE
Status: COMPLETED | OUTPATIENT
Start: 2022-06-04 | End: 2022-06-04

## 2022-06-04 RX ORDER — MAGNESIUM CARB/ALUMINUM HYDROX 105-160MG
296 TABLET,CHEWABLE ORAL
Qty: 296 ML | Refills: 0 | Status: SHIPPED | OUTPATIENT
Start: 2022-06-04 | End: 2022-06-04

## 2022-06-04 RX ADMIN — DEXAMETHASONE SODIUM PHOSPHATE 10 MG: 10 INJECTION INTRAMUSCULAR; INTRAVENOUS at 04:34

## 2022-06-04 RX ADMIN — ACETAMINOPHEN 1000 MG: 500 TABLET ORAL at 04:34

## 2022-06-04 RX ADMIN — ORPHENADRINE CITRATE 60 MG: 30 INJECTION INTRAMUSCULAR; INTRAVENOUS at 04:35

## 2022-06-04 RX ADMIN — KETOROLAC TROMETHAMINE 15 MG: 30 INJECTION, SOLUTION INTRAMUSCULAR at 04:35

## 2022-06-04 ASSESSMENT — PAIN SCALES - GENERAL
PAINLEVEL_OUTOF10: 10
PAINLEVEL_OUTOF10: 7

## 2022-06-04 ASSESSMENT — PAIN DESCRIPTION - LOCATION: LOCATION: ABDOMEN

## 2022-06-04 NOTE — Clinical Note
Claudia Walker was seen and treated in our emergency department on 6/4/2022. She may return to work on 06/07/2022. If you have any questions or concerns, please don't hesitate to call.       Rosmery Browne, DO

## 2022-06-04 NOTE — ED NOTES
Pt d/c'd home. Removed 22G IV and stopped bleeding. Catheter intact. Pt tolerated well. No redness noted at site. Reviewed d/c instructions, home meds, and  f/u information utilizing teach-back method. Scripts for mag citrate, norco, and colace given to patient. Patient verbalized understanding.           Sarath Lr RN  06/04/22 0733

## 2022-06-04 NOTE — ED PROVIDER NOTES
EMERGENCY DEPARTMENT PROVIDER NOTE    Patient Identification  Pt Name: Puja Montilla  MRN: [id number]  Stephanie 1975  Date of evaluation: 2022  Provider: Renaldo Ryan DO  PCP: Brenda Blank MD    Chief Complaint  Abdominal Pain (Patient states left lower abdominal pain started two weeks ago. Patient states the pain also radiates down left leg. Patient denies V/D.)      HPI  (History provided by patient)  This is a 52 y.o. female with pertinent past medical history of uterine fibroid who was brought in by self for left low back pain ongoing for the past 2 weeks. Pain radiates down her left leg to her ankle, worsened with movement. States over the past 2 days the pain is also begun to radiate into her left lower abdomen and groin. Nothing seems to make the symptoms better. Has tried anti-inflammatories, muscle relaxants and lidocaine patches to no relief. Associated with tingling sensation to her outer left calf. This is patient's third emergency department visit since 2022 for the symptoms. Aside from the left groin and lower abdominal pain, she reports her pain has not significantly changed otherwise since that time. She denies any fevers, chills, muscle weakness or  symptoms. No history of spinal injections or surgeries. ROS    Const:  No fevers, no chills, no generalized weakness  Skin:  No rash, no lesions  Eyes:  No visual changes, no blurry or double vision, no pain  ENT:  No sore throat, no difficulty swallowing, no ear pain, no sinus pain or congestion  Card:  No chest pain, no palpitations, no edema  Resp:  No shortness of breath, no cough, no wheezing  Abd:  +abdominal pain, no nausea, no vomiting, no diarrhea  Genitourinary:  No dysuria, no hematuria, no vaginal discharge, no vaginal bleeding, no urinary retention or incontinence.   MSK:  +joint pain, +myalgia  Neuro:  No focal weakness, no headache, no paresthesia    All other systems reviewed and negative unless otherwise noted in HPI      I have reviewed the following nursing documentation:  Allergies: Shellfish-derived products    Past medical history:   Past Medical History:   Diagnosis Date    Bacterial vaginosis     Bartholin's cyst     Bell's palsy     Fibroid     hx [place]    Necrotizing pancreatitis      Past surgical history:   Past Surgical History:   Procedure Laterality Date    BARTHOLIN GLAND CYST EXCISION       SECTION      DILATION AND CURETTAGE OF UTERUS         Home medications:   Discharge Medication List as of 2022  6:26 AM      CONTINUE these medications which have NOT CHANGED    Details   ibuprofen (ADVIL;MOTRIN) 800 MG tablet Take 1 tablet by mouth every 8 hours as needed for Pain, Disp-30 tablet, R-0Normal      methylPREDNISolone (MEDROL, MIRIAM,) 4 MG tablet By mouth., Disp-1 kit, R-0Normal      meloxicam (MOBIC) 15 MG tablet Take 1 tablet by mouth daily Start after completing Medrol., Disp-30 tablet, R-2Normal      tiZANidine (ZANAFLEX) 4 MG tablet Take 1 tablet by mouth 3 times daily as needed (Muscle tightness/spasm), Disp-30 tablet, R-1Normal      lidocaine 4 % external patch Place 1 patch onto the skin daily, TransDERmal, DAILY Starting 2022, Until Thu 2022, For 30 days, Disp-30 patch, R-0, Normal      conjugated estrogens (PREMARIN) 0.625 MG/GM vaginal cream Place 0.5 g vaginally Twice a Week, Vaginal, TWICE WEEKLY Starting 2022, Disp-1 each, R-1, Sample      acyclovir (ZOVIRAX) 400 MG tablet Historical Med      RESTASIS 0.05 % ophthalmic emulsion DAWHistorical Med      valACYclovir (VALTREX) 500 MG tablet Historical Med      sodium polystyrene (SPS) 15 GM/60ML suspension Take 60 mLs by mouth daily for 3 days, Disp-180 mL,R-0Print      albuterol sulfate HFA (PROVENTIL HFA) 108 (90 Base) MCG/ACT inhaler Inhale 2 puffs into the lungs every 4 hours as needed for Wheezing or Shortness of Breath With spacer (and mask if indicated). Thanks. , Disp-1 Inhaler, R-0Print      polyethylene glycol (MIRALAX) powder Take 17 g by mouth 2 times daily as needed (constipation), Disp-510 g, R-0Print             Social history:  reports that she quit smoking about 14 years ago. She has never used smokeless tobacco. She reports that she does not drink alcohol and does not use drugs. Family history:  History reviewed. No pertinent family history. Exam  ED Triage Vitals [06/04/22 0326]   BP Temp Temp Source Heart Rate Resp SpO2 Height Weight   129/88 97.7 °F (36.5 °C) Oral 76 20 100 % 5' 3\" (1.6 m) 138 lb (62.6 kg)     Nursing note and vitals reviewed. Constitutional: Well developed, well nourished. Non-toxic in appearance. HENT:      Head: Normocephalic and atraumatic. Ears: External ears normal.      Nose: Nose normal.     Mouth: Membrane mucosa moist and pink. Eyes: Anicteric sclera. No discharge. Neck: Supple. Trachea midline. Cardiovascular: RRR; no murmurs, rubs, or gallops. Pulmonary/Chest: Effort normal. No respiratory distress. CTAB. No stridor. No wheezes. No rales. Abdominal: Soft. No distension. Mild tenderness palpation of left lower quadrant. No right lower quadrant tenderness, negative Rovsing. No CVA tenderness. No rebound, no rigidity, no guarding. Musculoskeletal: Moves all extremities. No gross deformity. No midline lumbar tenderness, there is tenderness palpation of left SI and left lumbar paraspinal musculature without overlying skin changes. Neurological: Alert and orientedx4. Face symmetric. Speech is clear. L1-L2 Cremasteric Reflex (inner thigh sensation) is intact.  L2 Adduct Thigh (cross legs) mechanism intact.  L3 Extend Knee mechanism intact.  L4 Dorsiflex Ankle (Up) mechanism intact.  L5 Point Great Toe Up mechanism intact.  L2 L3-L4 Knee Reflex 2+   S1 Flex Knee mechanism intact.  S2 Plantarflex Toes mechanism intact.    S3, 4, 5 Groin, Perianal Sensation are intact, no saddle anesthesia  Normal gait is observed. Skin: Warm and dry. No rash. Psychiatric: Normal mood and affect. Behavior is normal.        Radiology  CT ABDOMEN PELVIS WO CONTRAST Additional Contrast? None   Final Result   1. Large stool volume suggestive of constipation. 2. At least minimal anasarca.              Labs  Results for orders placed or performed during the hospital encounter of 06/04/22   CBC with Auto Differential   Result Value Ref Range    WBC 6.4 4.0 - 11.0 K/uL    RBC 4.76 4.00 - 5.20 M/uL    Hemoglobin 13.7 12.0 - 16.0 g/dL    Hematocrit 42.1 36.0 - 48.0 %    MCV 88.4 80.0 - 100.0 fL    MCH 28.8 26.0 - 34.0 pg    MCHC 32.6 31.0 - 36.0 g/dL    RDW 14.6 12.4 - 15.4 %    Platelets 845 786 - 271 K/uL    MPV 8.0 5.0 - 10.5 fL    Neutrophils % 81.7 %    Lymphocytes % 9.4 %    Monocytes % 5.6 %    Eosinophils % 2.5 %    Basophils % 0.8 %    Neutrophils Absolute 5.2 1.7 - 7.7 K/uL    Lymphocytes Absolute 0.6 (L) 1.0 - 5.1 K/uL    Monocytes Absolute 0.4 0.0 - 1.3 K/uL    Eosinophils Absolute 0.2 0.0 - 0.6 K/uL    Basophils Absolute 0.1 0.0 - 0.2 K/uL   Basic Metabolic Panel w/ Reflex to MG   Result Value Ref Range    Sodium 139 136 - 145 mmol/L    Potassium reflex Magnesium 4.6 3.5 - 5.1 mmol/L    Chloride 104 99 - 110 mmol/L    CO2 28 21 - 32 mmol/L    Anion Gap 7 3 - 16    Glucose 74 70 - 99 mg/dL    BUN 28 (H) 7 - 20 mg/dL    CREATININE 0.7 0.6 - 1.1 mg/dL    GFR Non-African American >60 >60    GFR African American >60 >60    Calcium 9.4 8.3 - 10.6 mg/dL   Urinalysis with Reflex to Culture    Specimen: Urine   Result Value Ref Range    Color, UA Yellow Straw/Yellow    Clarity, UA Clear Clear    Glucose, Ur Negative Negative mg/dL    Bilirubin Urine Negative Negative    Ketones, Urine Negative Negative mg/dL    Specific Gravity, UA 1.020 1.005 - 1.030    Blood, Urine Negative Negative    pH, UA >=9.0 (A) 5.0 - 8.0    Protein, UA 30 (A) Negative mg/dL    Urobilinogen, Urine 1.0 <2.0 E.U./dL    Nitrite, Urine Negative Negative Leukocyte Esterase, Urine Negative Negative    Microscopic Examination YES     Urine Type NotGiven     Urine Reflex to Culture Not Indicated    HCG Qualitative, Serum   Result Value Ref Range    hCG Qual Negative Detects HCG level >10 MIU/mL   Sedimentation Rate   Result Value Ref Range    Sed Rate 9 0 - 20 mm/Hr   Microscopic Urinalysis   Result Value Ref Range    Bacteria, UA None Seen None Seen /HPF    Hyaline Casts, UA 0 0 - 8 /LPF    WBC, UA 0 0 - 5 /HPF    RBC, UA 0 0 - 4 /HPF    Epithelial Cells, UA 0 0 - 5 /HPF       Screenings   Avril Coma Scale  Eye Opening: Spontaneous  Best Verbal Response: Oriented  Best Motor Response: Obeys commands  [place] Coma Scale Score: 15       Is this patient to be included in the SEP-1 Core Measure due to severe sepsis or septic shock? No   Exclusion criteria - the patient is NOT to be included for SEP-1 Core Measure due to: Infection is not suspected      MDM and ED Course    Patient afebrile and nontoxic. No distress. Lower extremities are neurovascularly intact on high-sensitivity exam, no red flags for cauda equina. Given that patient has had 3 emergency departments for the symptoms which have been refractory to conservative management, further work-up was pursued. No peritoneal signs on abdominal exam, no focal findings to suggest acute cholecystitis or appendicitis. CT imaging reveals constipation, however no evidence of bowel obstruction, perforation, ureteral stone, intra-abdominal abscess or other acute process. Urinalysis bland, no other findings to suggest infection. Pregnancy negative. ESR normal, nothing to suggest SEA. Patient did feel improved with anti-inflammatories and muscle relaxant given here in the emergency department. [place] safe for discharge to self-care with treatment for constipation, to continue muscle relaxant and anti-inflammatories.   Will prescribe very short course of Norco, however I did  patient to use this extremely sparingly as this may worsen her constipation. PDMP was checked prior to prescription of controlled substances and was unrevealing. As per third strike policy, I did consult with [facility] failsafe who agreed with current management plan. Patient is also agreeable and feels comfortable returning home. Discussed importance of close PCP follow-up, will also refer for [place] spine. Strict return precautions discussed. Final Impression  1. Left lower quadrant abdominal pain    2. Constipation, unspecified constipation type    3. Lumbar radicular pain        Blood pressure (!) 112/59, pulse 76, temperature 97.7 °F (36.5 °C), temperature source Oral, resp. rate 20, height 5' 3\" (1.6 m), weight 138 lb (62.6 kg), SpO2 100 %. Disposition:  DISPOSITION Decision To Discharge 06/04/2022 06:14:18 AM      Patient Referrals:  Alonza Cushing, MD  [address] [id number]  [address] [place] [address] 238    In 2 days      [address] [place] [address] [id number] [place] [address] [address] [place] [address]  In 3 days        Discharge Medications:  Discharge Medication List as of 6/4/2022  6:26 AM      START taking these medications    Details   docusate sodium (COLACE) 100 mg capsule Take 1 capsule by mouth 2 times daily, Disp-60 capsule, R-0Print      Magnesium Citrate 1.745 GM/30ML solution Take 296 mLs by mouth once as needed for Constipation, Disp-296 mL, R-0Print      HYDROcodone-acetaminophen (NORCO) 5-325 MG per tablet Take 1 tablet by mouth every 4 hours as needed for Pain for up to 3 days. Intended supply: 3 days. Take lowest dose possible to manage pain, Disp-10 tablet, R-0Print             Discontinued Medications:  Discharge Medication List as of 6/4/2022  6:26 AM      STOP taking these medications       naproxen (NAPROSYN) 500 MG tablet Comments:   Reason for Stopping: This chart was generated using the [address] [place] [address] [address] [employer] system.  I created this record but it may contain dictation errors given the limitations of this technology.     Sintia Cole DO (electronically signed)  Attending Emergency Physician       Sintia Cole DO  06/04/22 2040

## 2022-06-04 NOTE — Clinical Note
Maria Dolores Rolando was seen and treated in our emergency department on 6/4/2022. She may return to work on 06/07/2022. If you have any questions or concerns, please don't hesitate to call.       James Browne, DO

## 2022-06-14 ENCOUNTER — TELEPHONE (OUTPATIENT)
Dept: GYNECOLOGY | Age: 47
End: 2022-06-14

## 2022-06-14 RX ORDER — TIZANIDINE 4 MG/1
TABLET ORAL
Qty: 30 TABLET | Refills: 1 | Status: SHIPPED | OUTPATIENT
Start: 2022-06-14 | End: 2022-06-29

## 2022-06-14 NOTE — TELEPHONE ENCOUNTER
Patient requesting an appointment for continued discharge. Says it has been going on for a year. Says nothing has worked to stop. Patient also says that there is an odor. Please advise.       Patient can be reached at [phone]

## 2022-06-16 ENCOUNTER — OFFICE VISIT (OUTPATIENT)
Dept: GYNECOLOGY | Age: 47
End: 2022-06-16
Payer: COMMERCIAL

## 2022-06-16 VITALS
OXYGEN SATURATION: 98 % | HEART RATE: 74 BPM | WEIGHT: 132 LBS | HEIGHT: 63 IN | BODY MASS INDEX: 23.39 KG/M2 | SYSTOLIC BLOOD PRESSURE: 110 MMHG | DIASTOLIC BLOOD PRESSURE: 68 MMHG | RESPIRATION RATE: 17 BRPM

## 2022-06-16 DIAGNOSIS — N89.8 VAGINAL DISCHARGE: Primary | ICD-10-CM

## 2022-06-16 PROCEDURE — 99213 OFFICE O/P EST LOW 20 MIN: CPT | Performed by: OBSTETRICS & GYNECOLOGY

## 2022-06-16 PROCEDURE — 1036F TOBACCO NON-USER: CPT | Performed by: OBSTETRICS & GYNECOLOGY

## 2022-06-16 PROCEDURE — G8420 CALC BMI NORM PARAMETERS: HCPCS | Performed by: OBSTETRICS & GYNECOLOGY

## 2022-06-16 PROCEDURE — G8427 DOCREV CUR MEDS BY ELIG CLIN: HCPCS | Performed by: OBSTETRICS & GYNECOLOGY

## 2022-06-16 RX ORDER — CLINDAMYCIN PHOSPHATE 20 MG/G
1 CREAM VAGINAL
Qty: 1 EACH | Refills: 5 | Status: SHIPPED | OUTPATIENT
Start: 2022-06-16 | End: 2022-12-13

## 2022-06-17 ASSESSMENT — ENCOUNTER SYMPTOMS
RESPIRATORY NEGATIVE: 1
EYES NEGATIVE: 1
GASTROINTESTINAL NEGATIVE: 1

## 2022-06-18 LAB
C TRACH DNA GENITAL QL NAA+PROBE: NEGATIVE
N. GONORRHOEAE DNA: NEGATIVE

## 2022-06-18 NOTE — PROGRESS NOTES
[address] [place] [address]  Dept: [phone]  Dept Fax: [phone]  Loc: [phone]     2022    Saul Mcpherson (:  1975) is a 52 y.o. female, here for evaluation of the following medical concerns:    Patient states she continues to have some vaginal discharge. Does feel better than when she first saw me. Vaginal Discharge        Review of Systems   Constitutional: Negative. HENT: Negative. Eyes: Negative. Respiratory: Negative. Cardiovascular: Negative. Gastrointestinal: Negative. Genitourinary: Positive for vaginal discharge. Musculoskeletal: Negative. Skin: Negative. Neurological: Negative. Psychiatric/Behavioral: Negative.       Date of Birth 1975  Past Medical History:   Diagnosis Date    Bacterial vaginosis     Bartholin's cyst     Bell's palsy     Fibroid     hx [place]    Necrotizing pancreatitis      Past Surgical History:   Procedure Laterality Date    BARTHOLIN GLAND CYST EXCISION       SECTION      DILATION AND CURETTAGE OF UTERUS       OB History    Para Term  AB Living   3 3 3     3   SAB IAB Ectopic Molar Multiple Live Births                    # Outcome Date GA Lbr Alexandr/2nd Weight Sex Delivery Anes PTL Lv   3 Term     M CS-LTranv      2 Term     M CS-LTranv      1 Term     M CS-LTranv        Social History     Socioeconomic History    Marital status: Single     Spouse name: Not on file    Number of children: Not on file    Years of education: Not on file    Highest education level: Not on file   Occupational History    Not on file   Tobacco Use    Smoking status: Former Smoker     Quit date: 2007     Years since quittin.9    Smokeless tobacco: Never Used   Vaping Use    Vaping Use: Never used   Substance and Sexual Activity    Alcohol use: No    Drug use: No    Sexual activity: Not Currently Partners: Male   Other Topics Concern    Not on file   Social History Narrative    Not on file     Social Determinants of Health     Financial Resource Strain:     Difficulty of Paying Living Expenses: Not on file   Food Insecurity:     Worried About Running Out of Food in the Last Year: Not on file    Juan of Food in the Last Year: Not on file   Transportation Needs:     Lack of Transportation (Medical): Not on file    Lack of Transportation (Non-Medical): Not on file   Physical Activity:     Days of Exercise per Week: Not on file    Minutes of Exercise per Session: Not on file   Stress:     Feeling of Stress : Not on file   Social Connections:     Frequency of Communication with Friends and Family: Not on file    Frequency of Social Gatherings with Friends and Family: Not on file    Attends Taoist Services: Not on file    Active Member of [address] [place] [address] or Organizations: Not on file    Attends Club or Organization Meetings: Not on file    Marital Status: Not on file   Intimate Partner Violence:     Fear of Current or Ex-Partner: Not on file    Emotionally Abused: Not on file    Physically Abused: Not on file    Sexually Abused: Not on file   Housing Stability:     Unable to Pay for Housing in the Last Year: Not on file    Number of [place] in the Last Year: Not on file    Unstable Housing in the Last Year: Not on file     Allergies   Allergen Reactions    Shellfish-Derived Products Itching     Outpatient Medications Marked as Taking for the 6/16/22 encounter (Office Visit) with Desmond Cartagena MD   Medication Sig Dispense Refill    clindamycin (CLEOCIN) 2 % vaginal cream Place 1 applicator vaginally Twice a Week Place vaginally nightly for 6 months.  1 each 5    tiZANidine (ZANAFLEX) 4 MG tablet TAKE 1 TABLET BY MOUTH THREE TIMES DAILY AS NEEDED FOR MUSCLE SPASMS OR TIGHTNESS 30 tablet 1    docusate sodium (COLACE) 100 mg capsule Take 1 capsule by mouth 2 times daily 60 capsule 0    ibuprofen (ADVIL;MOTRIN) 800 MG tablet Take 1 tablet by mouth every 8 hours as needed for Pain 30 tablet 0    methylPREDNISolone (MEDROL, MIRIAM,) 4 MG tablet By mouth. 1 kit 0    meloxicam (MOBIC) 15 MG tablet Take 1 tablet by mouth daily Start after completing Medrol. 30 tablet 2    lidocaine 4 % external patch Place 1 patch onto the skin daily 30 patch 0    conjugated estrogens (PREMARIN) 0.625 MG/GM vaginal cream Place 0.5 g vaginally Twice a Week 1 each 1    acyclovir (ZOVIRAX) 400 MG tablet       RESTASIS 0.05 % ophthalmic emulsion       valACYclovir (VALTREX) 500 MG tablet        History reviewed. No pertinent family history. /68 (Site: Right Upper Arm, Position: Sitting, Cuff Size: Medium Adult)   Pulse 74   Resp 17   Ht 5' 3\" (1.6 m)   Wt 132 lb (59.9 kg)   SpO2 98%   BMI 23.38 kg/m²       Prior to Visit Medications    Medication Sig Taking? Authorizing Provider   clindamycin (CLEOCIN) 2 % vaginal cream Place 1 applicator vaginally Twice a Week Place vaginally nightly for 6 months. Yes Mary Lou Swift MD   tiZANidine (ZANAFLEX) 4 MG tablet TAKE 1 TABLET BY MOUTH THREE TIMES DAILY AS NEEDED FOR MUSCLE SPASMS OR TIGHTNESS Yes Bozena Gordillo MD   docusate sodium (COLACE) 100 mg capsule Take 1 capsule by mouth 2 times daily Yes Anna Browne,    ibuprofen (ADVIL;MOTRIN) 800 MG tablet Take 1 tablet by mouth every 8 hours as needed for Pain Yes LUIS DANIEL Vazquez   methylPREDNISolone (MEDROL, MIRIAM,) 4 MG tablet By mouth. Yes Bozena Gordillo MD   meloxicam (MOBIC) 15 MG tablet Take 1 tablet by mouth daily Start after completing Medrol.  Yes Bozena Gordillo MD   lidocaine 4 % external patch Place 1 patch onto the skin daily Yes Sridevi Shin MD   conjugated estrogens (PREMARIN) 0.625 MG/GM vaginal cream Place 0.5 g vaginally Twice a Week Yes Mary Lou Swift MD   acyclovir (ZOVIRAX) 400 MG tablet  Yes Historical Provider, MD   RESTASIS 0.05 % ophthalmic emulsion  Yes Historical Provider, MD   valACYclovir (VALTREX) 500 MG tablet  Yes Historical Provider, MD   naproxen (NAPROSYN) 500 MG tablet Take 1 tablet by mouth 2 times daily as needed for Pain  Areli Andrews PA-C   sodium polystyrene (SPS) 15 GM/60ML suspension Take 60 mLs by mouth daily for 3 days  Romie Luther MD   albuterol sulfate HFA (PROVENTIL HFA) 108 (90 Base) MCG/ACT inhaler Inhale 2 puffs into the lungs every 4 hours as needed for Wheezing or Shortness of Breath With spacer (and mask if indicated). Thanks.   Areli Andrews PA-C   polyethylene glycol (MIRALAX) powder Take 17 g by mouth 2 times daily as needed (constipation)  Patient not taking: Reported on 2022  Bill Tucker MD        Allergies   Allergen Reactions    Shellfish-Derived Products Itching       Past Medical History:   Diagnosis Date    Bacterial vaginosis     Bartholin's cyst     Bell's palsy     Fibroid     hx [place]    Necrotizing pancreatitis        Past Surgical History:   Procedure Laterality Date    BARTHOLIN GLAND CYST EXCISION       SECTION      DILATION AND CURETTAGE OF UTERUS         Social History     Socioeconomic History    Marital status: Single     Spouse name: Not on file    Number of children: Not on file    Years of education: Not on file    Highest education level: Not on file   Occupational History    Not on file   Tobacco Use    Smoking status: Former Smoker     Quit date: 2007     Years since quittin.9    Smokeless tobacco: Never Used   Vaping Use    Vaping Use: Never used   Substance and Sexual Activity    Alcohol use: No    Drug use: No    Sexual activity: Not Currently     Partners: Male   Other Topics Concern    Not on file   Social History Narrative    Not on file     Social Determinants of Health     Financial Resource Strain:     Difficulty of Paying Living Expenses: Not on file   Food Insecurity:     Worried About Running Out of Food in the Last Year: Not on file    [address] in the Last Year: Not on file   Transportation Needs:     Lack of Transportation (Medical): Not on file    Lack of Transportation (Non-Medical): Not on file   Physical Activity:     Days of Exercise per Week: Not on file    Minutes of Exercise per Session: Not on file   Stress:     Feeling of Stress : Not on file   Social Connections:     Frequency of Communication with Friends and Family: Not on file    Frequency of Social Gatherings with Friends and Family: Not on file    Attends Tenriism Services: Not on file    Active Member of [address] [place] [address] [employer] or Organizations: Not on file    Attends Club or Organization Meetings: Not on file    Marital Status: Not on file   Intimate Partner Violence:     Fear of Current or Ex-Partner: Not on file    Emotionally Abused: Not on file    Physically Abused: Not on file    Sexually Abused: Not on file   Housing Stability:     Unable to Pay for Housing in the Last Year: Not on file    Number of [place] in the Last Year: Not on file    Unstable Housing in the Last Year: Not on file        History reviewed. No pertinent family history. Vitals:    06/16/22 1553   BP: 110/68   Site: Right Upper Arm   Position: Sitting   Cuff Size: Medium Adult   Pulse: 74   Resp: 17   SpO2: 98%   Weight: 132 lb (59.9 kg)   Height: 5' 3\" (1.6 m)     Estimated body mass index is 23.38 kg/m² as calculated from the following:    Height as of this encounter: 5' 3\" (1.6 m). Weight as of this encounter: 132 lb (59.9 kg). Physical Exam  Constitutional:       General: She is not in acute distress. Appearance: She is well-developed. She is not diaphoretic. HENT:      Head: Normocephalic. Eyes:      Pupils: Pupils are equal, round, and reactive to light. Abdominal:      General: There is no distension. Palpations: Abdomen is soft. There is no mass. Tenderness: There is no abdominal tenderness. There is no guarding or rebound.    Genitourinary: Labia:         Right: No rash, tenderness, lesion or injury. Left: No rash, tenderness, lesion or injury. Vagina: No signs of injury and foreign body. Vaginal discharge present. No erythema, tenderness or bleeding. Cervix: No cervical motion tenderness, discharge or friability. Uterus: Not deviated, not enlarged, not fixed and not tender. Adnexa:         Right: No mass, tenderness or fullness. Left: No mass, tenderness or fullness. Rectum: No external hemorrhoid. Comments: Normal urethral meatus, nl urethra, nl bladder. No fullness or tenderness along left bartholin area  Musculoskeletal:         General: No tenderness. Normal range of motion. Skin:     General: Skin is warm and dry. Coloration: Skin is not pale. Findings: No erythema or rash. Neurological:      Mental Status: She is alert and oriented to person, place, and time. Psychiatric:         Behavior: Behavior normal.         Thought Content: Thought content normal.         Judgment: Judgment normal.         ASSESSMENT/PLAN:  1.  Vaginal discharge  - Wet prep, genital  - Culture, Genital  - Culture, Ureaplasma/Mycoplasma hominis  - C.trachomatis N.gonorrhoeae DNA  -Try cleocin vaginal cream twice a week for 6 months to see if helps  -vulvar care  -sitz bath once a week

## 2022-06-20 LAB
FINAL REPORT: NORMAL
GENITAL CULTURE, ROUTINE: NORMAL
PRELIMINARY: NORMAL

## 2022-06-22 DIAGNOSIS — Z22.39 CARRIER OF UREAPLASMA UREALYTICUM: Primary | ICD-10-CM

## 2022-06-22 RX ORDER — DOXYCYCLINE HYCLATE 100 MG
100 TABLET ORAL 2 TIMES DAILY
Qty: 20 TABLET | Refills: 0 | OUTPATIENT
Start: 2022-06-22 | End: 2022-07-02

## 2022-06-23 ENCOUNTER — TELEPHONE (OUTPATIENT)
Dept: GYNECOLOGY | Age: 47
End: 2022-06-23

## 2022-06-23 NOTE — TELEPHONE ENCOUNTER
Called left a message for return call this is in regards to culture results under results , documented in results also asking for return call.

## 2022-06-24 NOTE — TELEPHONE ENCOUNTER
Called and spoke to patient,    Gave her results and also informed her of script been sent to her RX

## 2022-06-29 RX ORDER — TIZANIDINE 4 MG/1
TABLET ORAL
Qty: 40 TABLET | Refills: 1 | Status: SHIPPED | OUTPATIENT
Start: 2022-06-29 | End: 2022-07-06 | Stop reason: SDUPTHER

## 2022-07-06 ENCOUNTER — OFFICE VISIT (OUTPATIENT)
Dept: ORTHOPEDIC SURGERY | Age: 47
End: 2022-07-06

## 2022-07-06 VITALS — WEIGHT: 132.06 LBS | BODY MASS INDEX: 23.4 KG/M2 | HEIGHT: 63 IN

## 2022-07-06 DIAGNOSIS — M47.816 LUMBAR SPONDYLOSIS: ICD-10-CM

## 2022-07-06 DIAGNOSIS — M51.36 DDD (DEGENERATIVE DISC DISEASE), LUMBAR: ICD-10-CM

## 2022-07-06 DIAGNOSIS — M51.26 LUMBAR DISCOGENIC PAIN SYNDROME: ICD-10-CM

## 2022-07-06 DIAGNOSIS — M79.652 LEFT THIGH PAIN: ICD-10-CM

## 2022-07-06 DIAGNOSIS — M54.10 RADICULAR PAIN OF LEFT LOWER EXTREMITY: ICD-10-CM

## 2022-07-06 PROCEDURE — 99214 OFFICE O/P EST MOD 30 MIN: CPT | Performed by: INTERNAL MEDICINE

## 2022-07-06 PROCEDURE — G8427 DOCREV CUR MEDS BY ELIG CLIN: HCPCS | Performed by: INTERNAL MEDICINE

## 2022-07-06 PROCEDURE — G8420 CALC BMI NORM PARAMETERS: HCPCS | Performed by: INTERNAL MEDICINE

## 2022-07-06 PROCEDURE — 1036F TOBACCO NON-USER: CPT | Performed by: INTERNAL MEDICINE

## 2022-07-06 RX ORDER — LIDOCAINE 50 MG/G
1 PATCH TOPICAL EVERY 12 HOURS
Qty: 30 PATCH | Refills: 1 | Status: SHIPPED | OUTPATIENT
Start: 2022-07-06

## 2022-07-06 RX ORDER — TIZANIDINE 4 MG/1
4 TABLET ORAL EVERY 8 HOURS PRN
Qty: 40 TABLET | Refills: 1 | Status: SHIPPED | OUTPATIENT
Start: 2022-07-06 | End: 2022-07-14 | Stop reason: SDUPTHER

## 2022-07-06 RX ORDER — MELOXICAM 15 MG/1
15 TABLET ORAL DAILY
Qty: 30 TABLET | Refills: 2 | Status: SHIPPED | OUTPATIENT
Start: 2022-07-06

## 2022-07-06 RX ORDER — TRAMADOL HYDROCHLORIDE 50 MG/1
50 TABLET ORAL EVERY 6 HOURS PRN
Qty: 21 TABLET | Refills: 0 | Status: SHIPPED | OUTPATIENT
Start: 2022-07-06 | End: 2022-07-13

## 2022-07-06 NOTE — PROGRESS NOTES
Chief Complaint:   Chief Complaint   Patient presents with    Lower Back Pain     Lumbar, an achy pain to constantly be present, worse at night.  Hip Pain     L hip pain radiating down leg, severe pain the more she is on her fee the more discomfort she has especially when trying to lift boxes for work. History of Present Illness:       Patient is a 52 y.o. female returns follow up for the above complaint. The patient was last seen approximately 5 weeksago. The symptoms show no change since the last visit. The patient has had no further testing for the problem. Unfortunately symptoms of low back pain remain problematic. She has returned to work with restrictions as of 6/4/2022 despite this her symptoms remain problematic. Back:Leftleg pain 20:80. Pain in back and leg is burning or pins-and-needles in quality. The left lower limb pain localized to the anterior thigh without peripheralization. Back pain is left-sided lumbosacral    Pain levels:9. She has continued with lumbar stabilization home exercises prescribed to her no greater than 6 weeks. Despite this symptoms remain problematic. The patient denies new onset or progressive weakness of the lower extremities. The patient denies new onset bowel or bladder dysfunction. She has completed the initial course of medication prescribed. Past Medical History:        Past Medical History:   Diagnosis Date    Bacterial vaginosis     Bartholin's cyst     Bell's palsy     Fibroid     hx [place]    Necrotizing pancreatitis         Present Medications:         Current Outpatient Medications   Medication Sig Dispense Refill    tiZANidine (ZANAFLEX) 4 MG tablet Take 1 tablet by mouth every 8 hours as needed (Muscle tightness/spasm) 40 tablet 1    traMADol (ULTRAM) 50 MG tablet Take 1 tablet by mouth every 6 hours as needed for Pain for up to 7 days.  21 tablet 0    meloxicam (MOBIC) 15 MG tablet Take 1 tablet by mouth daily Start after completing Medrol. 30 tablet 2    lidocaine (LIDODERM) 5 % Place 1 patch onto the skin every 12 hours 12 hours on, 12 hours off. Max 3 patches at time 30 patch 1    clindamycin (CLEOCIN) 2 % vaginal cream Place 1 applicator vaginally Twice a Week Place vaginally nightly for 6 months. 1 each 5    conjugated estrogens (PREMARIN) 0.625 MG/GM vaginal cream Place 0.5 g vaginally Twice a Week 1 each 1    acyclovir (ZOVIRAX) 400 MG tablet       RESTASIS 0.05 % ophthalmic emulsion       valACYclovir (VALTREX) 500 MG tablet       sodium polystyrene (SPS) 15 GM/60ML suspension Take 60 mLs by mouth daily for 3 days 180 mL 0    albuterol sulfate HFA (PROVENTIL HFA) 108 (90 Base) MCG/ACT inhaler Inhale 2 puffs into the lungs every 4 hours as needed for Wheezing or Shortness of Breath With spacer (and mask if indicated). Thanks. 1 Inhaler 0    polyethylene glycol (MIRALAX) powder Take 17 g by mouth 2 times daily as needed (constipation) (Patient not taking: Reported on 4/7/2022) 510 g 0     No current facility-administered medications for this visit. Allergies: Allergies   Allergen Reactions    Shellfish-Derived Products Itching           Review of Systems:    Pertinent items are noted in HPI        Vital Signs: There were no vitals filed for this visit. General Exam:     Constitutional: Patient is adequately groomed with no evidence of malnutrition    Physical Exam: lower back      Primary Exam:    Inspection: No deformity atrophy or appreciable curvature      Palpation: No focal trigger point tenderness      Range of Motion: 80/10 pain with extension      Strength: Normal lower extremity      Special Tests: SLR positive left      Skin: There are no rashes, ulcerations or lesions.       Gait: Antalgic     Neurovascular - non focal and intact     Additional Comments:        Additional Examinations:                     Office Imaging Results/Procedures PerformedToday:           Office Procedures:     Orders Placed This Encounter   Procedures    XR HIP LEFT (2-3 VIEWS)     Standing Status:   Future     Number of Occurrences:   1     Standing Expiration Date:   7/6/2023     Order Specific Question:   Reason for exam:     Answer:   pain    MRI LUMBAR SPINE WO CONTRAST     Standing Status:   Future     Standing Expiration Date:   7/6/2023     Scheduling Instructions:      Joana Carmen, please call pt to schedule, Elizabeth Chang will obtain auth and fwd to your facility. Pt advised to f/u in clinic 2-3 days after MRI for results. Order Specific Question:   Reason for exam:     Answer:   R/O HNP / STENOSIS - L RADIC     Order Specific Question:   What is the sedation requirement? Answer:   None     X-rays 2 view of the left hip  Femoral acetabular joint has a normal radiographic appearance without evidence of DDH or ERIK. Proximal vascular pattern of the femur is normal.  No other soft tissue or osseous abnormalities      Other Outside Imaging and Testing Personally Reviewed:    XR HIP LEFT (2-3 VIEWS)    Result Date: 7/6/2022  Radiology exam is complete. No Radiologist dictation. Please follow up with ordering provider. Assessment   Impression: . Encounter Diagnoses   Name Primary?  Lumbar discogenic pain syndrome     DDD (degenerative disc disease), lumbar     Lumbar spondylosis     Radicular pain of left lower extremity               Plan:       MRI lumbar spine evaluate severity of discopathy/ stenosis suspected clinically  Consider Her a candidate for spine intervention injection  Activity modification, lumbar spine precautions  lumbar spinestabilization home exercise program  Medical pain management: NSAID: Meloxicam, analgesic: Ultram as needed short-term, Lidoderm. Continue formal restrictions in the workplace as per previous  Monitor left patellar tendon reflex on follow-up      Overall I believe the left thigh pain is of radicular etiology. Proceed as outlined above           Orders:        Orders Placed This Encounter   Procedures    XR HIP LEFT (2-3 VIEWS)     Standing Status:   Future     Number of Occurrences:   1     Standing Expiration Date:   7/6/2023     Order Specific Question:   Reason for exam:     Answer:   pain    MRI LUMBAR SPINE WO CONTRAST     Standing Status:   Future     Standing Expiration Date:   7/6/2023     Scheduling Instructions:      Jasmine Harris, please call pt to schedule, [address] will obtain auth and fwd to your facility. Pt advised to f/u in clinic 2-3 days after MRI for results. Order Specific Question:   Reason for exam:     Answer:   R/O HNP / STENOSIS - L RADIC     Order Specific Question:   What is the sedation requirement? Answer:   None         Erick Rinaldi MD.      Disclaimer: \"This note was dictated with voice recognition software. Though review and correction are routine, we apologize for any errors. \"

## 2022-07-06 NOTE — LETTER
Shamika T[address]ano [address]  [address] [facility] [address]  Phone: [phone]  Fax: [phone]    Sherron Boucher MD        July 6, 2022     Patient: Dom Akbar   YOB: 1975   Date of Visit: 7/6/2022       To Whom It May Concern: It is my medical opinion that Carmelita Murray may return to light duty immediately with the following restrictions: No repetitive bending or stooping. No lifting from floor to waist  Carrying limited to 20 pounds with use of both arms position close to body  Pushing and pulling limited to 10 pounds occasional frequency  Allow transition from sitting to standing as needed for symptom control  These restrictions are in effect for the next 6 weeks    If you have any questions or concerns, please don't hesitate to call.     Sincerely,      Sumanth Lobo MD.    Sherron Boucher MD

## 2022-07-14 RX ORDER — TIZANIDINE 4 MG/1
TABLET ORAL
Qty: 40 TABLET | Refills: 1 | Status: SHIPPED | OUTPATIENT
Start: 2022-07-14 | End: 2022-08-15

## 2022-07-20 DIAGNOSIS — R82.90 ABNORMAL URINE ODOR: Primary | ICD-10-CM

## 2022-07-20 NOTE — TELEPHONE ENCOUNTER
Order placed for urine culture, meds called in, asked pharmacy to call patient let her know when script ready for .  Called patient went over recommendations from Dr. Fiorella Kat, she understood DONE    Please sign orders and close this message

## 2022-07-20 NOTE — TELEPHONE ENCOUNTER
Patient feels like her bacterial infection has returned. She has cramping and strong odor in her urine. Requesting antibiotics. Patient also wondered why it keeps returning.      Patient can be reached at [address] [facility] [address] [place] [address] Kimberly [address] [place] [address] [phone]

## 2022-07-20 NOTE — TELEPHONE ENCOUNTER
Call in Doxycycline 100 mg. Dispense 20. Take one po bid for 10 days. Have her do urine for culture as well. Tell her about vaginal care with soap and probiotic.

## 2022-07-21 RX ORDER — DOXYCYCLINE HYCLATE 100 MG
100 TABLET ORAL 2 TIMES DAILY
Qty: 20 TABLET | Refills: 0 | OUTPATIENT
Start: 2022-07-21 | End: 2022-07-31

## 2022-08-04 ENCOUNTER — TELEPHONE (OUTPATIENT)
Dept: GYNECOLOGY | Age: 47
End: 2022-08-04

## 2022-08-04 NOTE — TELEPHONE ENCOUNTER
Patient requesting medication refill. Wants Doxycycline. Also would like an appointment next week.        Symptoms:  Heavy, white discharge, crampy ,blotness, smelly odor      Patient can be reached at [place] [phone]

## 2022-08-05 NOTE — TELEPHONE ENCOUNTER
Tell patient that I have a 3:30 pm on 8/9/22. I do not want to call in Doxy if I am going to be seeing her and doing cultures. It will mess up the testing and I want to be sure I am giving her the correct treatment.

## 2022-08-09 ENCOUNTER — OFFICE VISIT (OUTPATIENT)
Dept: GYNECOLOGY | Age: 47
End: 2022-08-09
Payer: COMMERCIAL

## 2022-08-09 VITALS
DIASTOLIC BLOOD PRESSURE: 63 MMHG | WEIGHT: 139.5 LBS | SYSTOLIC BLOOD PRESSURE: 105 MMHG | OXYGEN SATURATION: 99 % | HEART RATE: 71 BPM | BODY MASS INDEX: 24.72 KG/M2

## 2022-08-09 DIAGNOSIS — N30.00 ACUTE CYSTITIS WITHOUT HEMATURIA: ICD-10-CM

## 2022-08-09 DIAGNOSIS — N89.8 VAGINAL ODOR: ICD-10-CM

## 2022-08-09 DIAGNOSIS — N76.0 BV (BACTERIAL VAGINOSIS): ICD-10-CM

## 2022-08-09 DIAGNOSIS — Z11.3 SCREEN FOR STD (SEXUALLY TRANSMITTED DISEASE): Primary | ICD-10-CM

## 2022-08-09 DIAGNOSIS — B96.89 BV (BACTERIAL VAGINOSIS): ICD-10-CM

## 2022-08-09 PROCEDURE — 99214 OFFICE O/P EST MOD 30 MIN: CPT | Performed by: OBSTETRICS & GYNECOLOGY

## 2022-08-09 RX ORDER — METRONIDAZOLE 7.5 MG/G
1 GEL VAGINAL EVERY OTHER DAY
Qty: 70 G | Refills: 5 | Status: SHIPPED | OUTPATIENT
Start: 2022-08-09

## 2022-08-09 RX ORDER — NITROFURANTOIN 25; 75 MG/1; MG/1
100 CAPSULE ORAL 2 TIMES DAILY
Qty: 14 CAPSULE | Refills: 0 | Status: SHIPPED | OUTPATIENT
Start: 2022-08-09 | End: 2022-08-16

## 2022-08-09 NOTE — PROGRESS NOTES
Chief complaint: Vaginal Discharge (Has been having a very strong smelling discharge since July 2021, last pap 6 months ago )      History of present illness: Migel Curly 52 y.o. female No LMP recorded. Patient is perimenopausal.  Who presents with complaint of vaginal odor. The patient complains of vaginal odor over the last year. The patient has been on several medications for this including MetroGel vaginal cream, clindamycin vaginal cream, and Premarin cream.  See previous notes. The patient also tells me that she has a history of HSV. She states that she has not been taking any antiviral/Valtrex  The patient complains of a foul odor. She relates a story of getting HSV from a partner whom she trusted and felt that it was a moment of weakness and she was vulnerable. The patient is quite upset over this event that occurred sometime ago. Additionally, the patient reports that she has a history of a left Bartholin abscess that was either drained or marsupialized and she has continued discharge or drainage from this. She voiced some concerns regarding this but also notes that its not bothering her. She reports that every now and then she will have some discomfort with urination and she would like some antibiotics for this. She reports that she has been using the MetroGel vaginal cream and this is the only thing that helps control the odor. She is quite offended by the odor. This is causing her some distress. Patient Active Problem List   Diagnosis    Strain of back    Hip pain, acute, left       Review of systems:  No complaints of symptoms involving:  Constitutional: negative for fever, chills. Eyes: No change in vision, double vision, or scotomata. HENT: No sore throat, ear pain or nasal congestion. Respiratory: No cough, shortness of breath or hemoptysis. Cardiovascular: No chest pain, orthopnea, fainting. Skin: No pruritus or generalized rash.   Neurologic: No focal weakness or sensory changes. Past medical history, past surgical history, allergies, family history, and social history are all updated in the electronic medical record and reviewed. Past Medical History:   Diagnosis Date    Bacterial vaginosis     Bartholin's cyst     Bell's palsy     Fibroid     hx [place]    Necrotizing pancreatitis      Past Surgical History:   Procedure Laterality Date    BARTHOLIN GLAND CYST EXCISION       SECTION      DILATION AND CURETTAGE OF UTERUS       OB History          3    Para   3    Term   3            AB        Living   3         SAB        IAB        Ectopic        Molar        Multiple        Live Births                  Current Outpatient Medications on File Prior to Visit   Medication Sig Dispense Refill    tiZANidine (ZANAFLEX) 4 MG tablet TAKE 1 TABLET BY MOUTH THREE TIMES DAILY AS NEEDED FOR MUSCLE SPASMS OR TIGHTNESS 40 tablet 1    meloxicam (MOBIC) 15 MG tablet Take 1 tablet by mouth daily Start after completing Medrol. 30 tablet 2    lidocaine (LIDODERM) 5 % Place 1 patch onto the skin every 12 hours 12 hours on, 12 hours off. Max 3 patches at time 30 patch 1    clindamycin (CLEOCIN) 2 % vaginal cream Place 1 applicator vaginally Twice a Week Place vaginally nightly for 6 months.  1 each 5    conjugated estrogens (PREMARIN) 0.625 MG/GM vaginal cream Place 0.5 g vaginally Twice a Week 1 each 1    acyclovir (ZOVIRAX) 400 MG tablet       RESTASIS 0.05 % ophthalmic emulsion       valACYclovir (VALTREX) 500 MG tablet       polyethylene glycol (MIRALAX) powder Take 17 g by mouth 2 times daily as needed (constipation) 510 g 0    [DISCONTINUED] naproxen (NAPROSYN) 500 MG tablet Take 1 tablet by mouth 2 times daily as needed for Pain 20 tablet 0    sodium polystyrene (SPS) 15 GM/60ML suspension Take 60 mLs by mouth daily for 3 days 180 mL 0    albuterol sulfate HFA (PROVENTIL HFA) 108 (90 Base) MCG/ACT inhaler Inhale 2 puffs into the lungs every 4 hours as needed for Wheezing or Shortness of Breath With spacer (and mask if indicated). Thanks. 1 Inhaler 0     No current facility-administered medications on file prior to visit. Allergy: Shellfish-derived products  Social History     Tobacco Use    Smoking status: Former     Types: Cigarettes     Quit date: 6/25/2007     Years since quitting: 15.1    Smokeless tobacco: Never   Substance Use Topics    Alcohol use: No     No family history on file. Social History     Socioeconomic History    Marital status: Single     Spouse name: Not on file    Number of children: Not on file    Years of education: Not on file    Highest education level: Not on file   Occupational History    Not on file   Tobacco Use    Smoking status: Former     Types: Cigarettes     Quit date: 6/25/2007     Years since quitting: 15.1    Smokeless tobacco: Never   Vaping Use    Vaping Use: Never used   Substance and Sexual Activity    Alcohol use: No    Drug use: No    Sexual activity: Not Currently     Partners: Male   Other Topics Concern    Not on file   Social History Narrative    Not on file     Social Determinants of Health     Financial Resource Strain: Not on file   Food Insecurity: Not on file   Transportation Needs: Not on file   Physical Activity: Not on file   Stress: Not on file   Social Connections: Not on file   Intimate Partner Violence: Not on file   Housing Stability: Not on file       Physical exam:  /63 (Site: Right Upper Arm, Position: Sitting, Cuff Size: Large Adult)   Pulse 71   Wt 139 lb 8 oz (63.3 kg)   SpO2 99%   BMI 24.72 kg/m²  Body mass index is 24.72 kg/m². No LMP recorded. Patient is perimenopausal.  Constitutional: alert, no acute distress, non-toxic, normal respiratory effort  Eyes: Sclera are clear and nonicteric. Noninjected. Lids are grossly normal.  Pupils are round equal.  Irises are grossly normal.  Mouth/ENT: Lips, teeth, gums grossly normal.  Psychiatric: Judgment and insight are intact.   Mood and affect are appropriate, calm. Skin:  no lesions,no rashes  Neck: Symmetric without gross mass effect. Trachea midline. Respiratory: Normal respiratory effort. No accessory muscles used. Gastrointestinal/Abdominal: Abdomen soft, non-tender. No rebound or guarding. No masses, no hepatosplenomegaly, no hernia  Genitourinary:  Vulva: On the vulva is white MetroGel vaginal cream.  There was evidence of prior surgical changes to the left Bartholin gland but this is completely within normal limits and without mass tenderness or discharge. Vagina:  moist,pink,well rugated,no discharge  Bladder without mass, nontender. Urethra, and Urethral meatus grossly normal without mass and nontender  Cervix:  smooth,pink,no lesions. Uterus:  normal size, shape and consistency,mobile,nontender  Adnexa:  no masses,no tenderness  Rectum/anus:  no external hemorrhoids,no lesions  History and Examination chaperoned by Medical Assistant         Diagnosis Orders   1. Screen for STD (sexually transmitted disease)  Trichomonas Screen    Neisseria gonorrhoeae DNA    CHLAMYDIA DNA PROBE      2. BV (bacterial vaginosis)  metroNIDAZOLE (METROGEL VAGINAL) 0.75 % vaginal gel      3. Vaginal odor        4. Acute cystitis without hematuria  nitrofurantoin, macrocrystal-monohydrate, (MACROBID) 100 MG capsule          Differential diagnosis and management/testing options:  Primary complaint revolves around vaginal odor that is quite offensive to the patient. See above/history of present illness. We discussed treatment for this and the patient would like to use MetroGel on a ongoing basis. I encouraged her to start spacing out her dosing as she states that she will start with every other day and she will space out her dosing to minimize her odor noticing. As above, she wants a antibiotic for possible UTI and this is sent to the pharmacy for some Macrobid. I discussed with her testing options and she consents to testing as ordered.   Limitations of testing results with respect to her current antibiotic vaginal use were also reviewed. Testing: See orders    Medications: MetroGel. Macrobid  Risks & side effects, benefits, and alternatives of medications were discussed. Indications for medications are outlined in diagnoses. Benefits outweigh potential risk. Medications are prescribed as part of an overall monitoring / treatment plan with regular, scheduled follow up. The above medication counseling has been and will continue to be discussed with the patient, who agrees with the plan and voices understanding. Management options, where appropriate, were discussed. Diagnoses were discussed in detail; patient voiced understanding. Future direction:  If she does not improve with current management, further work-up or treatment may be necessary. Warnings and instructions were given. Her questions were answered. Emilio Barcenas voices understanding. The level of care, extent of history, examination, laboratory review, imaging review, and complexity of care is based on the patient receiving specialty services. The extent and elements of her examination are indicated and appropriate in management. Image review, when appropriate, is done directly with the patient and her support person/family when present. This service is being reported inclusive to the evaluation and management services. I have spent >30 minutes to complete the necessary history, physical examination, review and present imaging studies/laboratory tests, review findings, formulate a treatment plan, educating/explaining condition and address questions, and shared decision making      Please note that some or all of this record was generated using voice recognition software. If there are any questions about the content of this document, please contact Dr. Owen Gates as some errors in transcription may have occurred.

## 2022-08-10 LAB
REASON FOR REJECTION: NORMAL
REJECTED TEST: NORMAL

## 2022-08-11 ENCOUNTER — TELEPHONE (OUTPATIENT)
Dept: GYNECOLOGY | Age: 47
End: 2022-08-11

## 2022-08-11 LAB
C TRACH DNA GENITAL QL NAA+PROBE: NEGATIVE
N. GONORRHOEAE DNA: NEGATIVE

## 2022-08-11 NOTE — TELEPHONE ENCOUNTER
Received a message over teams from [address] [place] [address] asking to speak with me about a specimen rejection from the lab. After speaking to her and Nelly Suero called the lab. Lab had rejected the test for Trichomonas Screening for being in the incorrect tube. I asked if they were able to complete the same fix for the pt that they had done yesterday but they declined. The pt yesterday had gotten a pap done and they were able to use that. [address] [place] [address] did not get a pap. Should pt come back for a retest once the proper tubes are delivered?

## 2022-08-15 RX ORDER — TIZANIDINE 4 MG/1
TABLET ORAL
Qty: 40 TABLET | Refills: 0 | Status: SHIPPED | OUTPATIENT
Start: 2022-08-15

## 2022-08-29 ENCOUNTER — TELEPHONE (OUTPATIENT)
Dept: GYNECOLOGY | Age: 47
End: 2022-08-29

## 2022-08-29 DIAGNOSIS — B37.9 YEAST INFECTION: Primary | ICD-10-CM

## 2022-08-29 NOTE — TELEPHONE ENCOUNTER
Patient thinks she has a yeast infection. She tried setting up an e-visit but it told her to call the office. Symptoms have been going on for 2 weeks but patient feels that its getting worse. Symptoms: odor cloudy discharge, irritation     Requesting prescription be called into her pharmacy.       Patient can be reached at [address] [facility] [phone] Madiha Lopez [address] [facility] [phone] Diandra Luz [phone]

## 2022-08-30 RX ORDER — FLUCONAZOLE 150 MG/1
150 TABLET ORAL ONCE
Qty: 1 TABLET | Refills: 1 | OUTPATIENT
Start: 2022-08-30 | End: 2022-08-30

## 2022-08-30 NOTE — TELEPHONE ENCOUNTER
Pt called back states her urine is foul  smelling and she is having pelvic pain now. Does not think fluconazole will help as she believes it may be a UTI now.

## 2022-08-31 ENCOUNTER — TELEPHONE (OUTPATIENT)
Dept: ORTHOPEDIC SURGERY | Age: 47
End: 2022-08-31

## 2022-08-31 ENCOUNTER — HOSPITAL ENCOUNTER (OUTPATIENT)
Age: 47
Discharge: HOME | End: 2022-08-31
Payer: COMMERCIAL

## 2022-08-31 DIAGNOSIS — R82.90 ABNORMAL URINE ODOR: ICD-10-CM

## 2022-08-31 PROCEDURE — 87086 URINE CULTURE/COLONY COUNT: CPT

## 2022-08-31 NOTE — TELEPHONE ENCOUNTER
Patient called in again stating that she got her culture complete. Patient would like some stronger medication called in.  States that what was called in before isn't strong enough and she needs something else    Patient can be reached at [address] [facility] [phone] Madiha Otero  [address] [facility] [phone] Mendoza Saenz [phone]

## 2022-08-31 NOTE — TELEPHONE ENCOUNTER
See if patient can come in at 4:20 pm tomorrow for an exam. I need to see what is going on with her pelvic pain/infection, etc.9/1/22

## 2022-09-01 ENCOUNTER — OFFICE VISIT (OUTPATIENT)
Dept: GYNECOLOGY | Age: 47
End: 2022-09-01
Payer: COMMERCIAL

## 2022-09-01 VITALS
OXYGEN SATURATION: 98 % | DIASTOLIC BLOOD PRESSURE: 68 MMHG | HEART RATE: 64 BPM | RESPIRATION RATE: 17 BRPM | BODY MASS INDEX: 25.37 KG/M2 | SYSTOLIC BLOOD PRESSURE: 110 MMHG | WEIGHT: 143.2 LBS | HEIGHT: 63 IN

## 2022-09-01 DIAGNOSIS — R10.2 PELVIC PAIN: Primary | ICD-10-CM

## 2022-09-01 DIAGNOSIS — N90.89 VULVAR IRRITATION: ICD-10-CM

## 2022-09-01 DIAGNOSIS — N89.8 VAGINAL DISCHARGE: ICD-10-CM

## 2022-09-01 DIAGNOSIS — N83.201 RIGHT OVARIAN CYST: ICD-10-CM

## 2022-09-01 LAB — URINE CULTURE, ROUTINE: NORMAL

## 2022-09-01 PROCEDURE — G8419 CALC BMI OUT NRM PARAM NOF/U: HCPCS | Performed by: OBSTETRICS & GYNECOLOGY

## 2022-09-01 PROCEDURE — G8427 DOCREV CUR MEDS BY ELIG CLIN: HCPCS | Performed by: OBSTETRICS & GYNECOLOGY

## 2022-09-01 PROCEDURE — 99213 OFFICE O/P EST LOW 20 MIN: CPT | Performed by: OBSTETRICS & GYNECOLOGY

## 2022-09-01 PROCEDURE — 1036F TOBACCO NON-USER: CPT | Performed by: OBSTETRICS & GYNECOLOGY

## 2022-09-05 LAB — GENITAL CULTURE, ROUTINE: NORMAL

## 2022-09-05 ASSESSMENT — ENCOUNTER SYMPTOMS
GASTROINTESTINAL NEGATIVE: 1
ALLERGIC/IMMUNOLOGIC NEGATIVE: 1
EYES NEGATIVE: 1
RESPIRATORY NEGATIVE: 1

## 2022-09-05 NOTE — PROGRESS NOTES
[address] [place] [address]  Dept: [phone]  Dept Fax: [phone]  Loc: [phone]     2022    Patricia Angulo (:  1975) is a 52 y.o. female, here for evaluation of the following medical concerns:    Patient is here for concern of vulvar irritation. Problems with left bartholin cyst. Patient with pelvic cramping. Pelvic Pain      Review of Systems   Constitutional: Negative. HENT: Negative. Eyes: Negative. Respiratory: Negative. Cardiovascular: Negative. Gastrointestinal: Negative. Endocrine: Negative. Genitourinary:  Positive for pelvic pain and vaginal pain. Musculoskeletal: Negative. Skin: Negative. Allergic/Immunologic: Negative. Neurological: Negative. Hematological: Negative. Psychiatric/Behavioral: Negative.        Date of Birth 1975  Past Medical History:   Diagnosis Date    Bacterial vaginosis     Bartholin's cyst     Bell's palsy     Fibroid     hx [facility]    Necrotizing pancreatitis      Past Surgical History:   Procedure Laterality Date    BARTHOLIN GLAND CYST EXCISION       SECTION      DILATION AND CURETTAGE OF UTERUS       OB History    Para Term  AB Living   3 3 3     3   SAB IAB Ectopic Molar Multiple Live Births                    # Outcome Date GA Lbr Alexandr/2nd Weight Sex Delivery Anes PTL Lv   3 Term     M CS-LTranv      2 Term     M CS-LTranv      1 Term     M CS-LTranv        Social History     Socioeconomic History    Marital status: Single     Spouse name: Not on file    Number of children: Not on file    Years of education: Not on file    Highest education level: Not on file   Occupational History    Not on file   Tobacco Use    Smoking status: Former     Types: Cigarettes     Quit date: 2007     Years since quitting: 15.2    Smokeless tobacco: Never   Vaping Use    Vaping Use: Never used   Substance and Sexual Activity    Alcohol use: No    Drug use: No    Sexual activity: Not Currently     Partners: Male   Other Topics Concern    Not on file   Social History Narrative    Not on file     Social Determinants of Health     Financial Resource Strain: Not on file   Food Insecurity: Not on file   Transportation Needs: Not on file   Physical Activity: Not on file   Stress: Not on file   Social Connections: Not on file   Intimate Partner Violence: Not on file   Housing Stability: Not on file     Allergies   Allergen Reactions    Shellfish-Derived Products Itching     Outpatient Medications Marked as Taking for the 9/1/22 encounter (Office Visit) with Mitzy Eli MD   Medication Sig Dispense Refill    tiZANidine (ZANAFLEX) 4 MG tablet TAKE 1 TABLET BY MOUTH THREE TIMES DAILY AS NEEDED FOR MUSCLE SPASMS OR TIGHTNESS 40 tablet 0    metroNIDAZOLE (METROGEL VAGINAL) 0.75 % vaginal gel Place 1 Applicatorful vaginally every other day 70 g 5    meloxicam (MOBIC) 15 MG tablet Take 1 tablet by mouth daily Start after completing Medrol. 30 tablet 2    lidocaine (LIDODERM) 5 % Place 1 patch onto the skin every 12 hours 12 hours on, 12 hours off. Max 3 patches at time 30 patch 1    clindamycin (CLEOCIN) 2 % vaginal cream Place 1 applicator vaginally Twice a Week Place vaginally nightly for 6 months. 1 each 5    acyclovir (ZOVIRAX) 400 MG tablet       RESTASIS 0.05 % ophthalmic emulsion       valACYclovir (VALTREX) 500 MG tablet       polyethylene glycol (MIRALAX) powder Take 17 g by mouth 2 times daily as needed (constipation) 510 g 0     No family history on file. /68 (Site: Right Upper Arm, Position: Standing, Cuff Size: Medium Adult)   Pulse 64   Resp 17   Ht 5' 3\" (1.6 m)   Wt 143 lb 3.2 oz (65 kg)   SpO2 98%   BMI 25.37 kg/m²       Prior to Visit Medications    Medication Sig Taking?  Authorizing Provider   tiZANidine (ZANAFLEX) 4 MG tablet TAKE 1 TABLET BY MOUTH THREE TIMES DAILY AS NEEDED FOR MUSCLE SPASMS OR TIGHTNESS Yes Amando Ybarra MD   metroNIDAZOLE (METROGEL VAGINAL) 0.75 % vaginal gel Place 1 Applicatorful vaginally every other day Yes Deyvi Vela MD   meloxicam (MOBIC) 15 MG tablet Take 1 tablet by mouth daily Start after completing Medrol. Yes Amando Ybarra MD   lidocaine (LIDODERM) 5 % Place 1 patch onto the skin every 12 hours 12 hours on, 12 hours off. Max 3 patches at time Yes Amando Ybarra MD   clindamycin (CLEOCIN) 2 % vaginal cream Place 1 applicator vaginally Twice a Week Place vaginally nightly for 6 months. Yes Uriel Herrera MD   acyclovir (ZOVIRAX) 400 MG tablet  Yes Historical Provider, MD   RESTASIS 0.05 % ophthalmic emulsion  Yes Historical Provider, MD   valACYclovir (VALTREX) 500 MG tablet  Yes Historical Provider, MD   polyethylene glycol (MIRALAX) powder Take 17 g by mouth 2 times daily as needed (constipation) Yes Miki Villela MD   naproxen (NAPROSYN) 500 MG tablet Take 1 tablet by mouth 2 times daily as needed for Pain  Julio Cesar Casey PA-C   sodium polystyrene (SPS) 15 GM/60ML suspension Take 60 mLs by mouth daily for 3 days  Harley Salazar MD   albuterol sulfate HFA (PROVENTIL HFA) 108 (90 Base) MCG/ACT inhaler Inhale 2 puffs into the lungs every 4 hours as needed for Wheezing or Shortness of Breath With spacer (and mask if indicated). Thanks.   Julio Cesar Casey PA-C        Allergies   Allergen Reactions    Shellfish-Derived Products Itching       Past Medical History:   Diagnosis Date    Bacterial vaginosis     Bartholin's cyst     Bell's palsy     Fibroid     hx [facility]    Necrotizing pancreatitis        Past Surgical History:   Procedure Laterality Date    BARTHOLIN GLAND CYST EXCISION       SECTION      DILATION AND CURETTAGE OF UTERUS         Social History     Socioeconomic History    Marital status: Single     Spouse name: Not on file    Number of children: Not on file    Years of education: Not on file    Highest education level: Not on file   Occupational History    Not on file   Tobacco Use    Smoking status: Former     Types: Cigarettes     Quit date: 6/25/2007     Years since quitting: 15.2    Smokeless tobacco: Never   Vaping Use    Vaping Use: Never used   Substance and Sexual Activity    Alcohol use: No    Drug use: No    Sexual activity: Not Currently     Partners: Male   Other Topics Concern    Not on file   Social History Narrative    Not on file     Social Determinants of Health     Financial Resource Strain: Not on file   Food Insecurity: Not on file   Transportation Needs: Not on file   Physical Activity: Not on file   Stress: Not on file   Social Connections: Not on file   Intimate Partner Violence: Not on file   Housing Stability: Not on file        No family history on file. Vitals:    09/01/22 1641   BP: 110/68   Site: Right Upper Arm   Position: Standing   Cuff Size: Medium Adult   Pulse: 64   Resp: 17   SpO2: 98%   Weight: 143 lb 3.2 oz (65 kg)   Height: 5' 3\" (1.6 m)     Estimated body mass index is 25.37 kg/m² as calculated from the following:    Height as of this encounter: 5' 3\" (1.6 m). Weight as of this encounter: 143 lb 3.2 oz (65 kg). Physical Exam  Constitutional:       General: She is not in acute distress. Appearance: She is well-developed. She is not diaphoretic. HENT:      Head: Normocephalic. Eyes:      Extraocular Movements: Extraocular movements intact. Abdominal:      General: There is no distension. Palpations: Abdomen is soft. There is no mass. Tenderness: There is no abdominal tenderness. There is no guarding or rebound. Genitourinary:     General: Normal vulva. Exam position: Lithotomy position. Labia:         Right: No rash, tenderness, lesion or injury. Left: No rash, tenderness, lesion or injury. Urethra: No prolapse, urethral pain, urethral swelling or urethral lesion. Vagina: No signs of injury and foreign body.  Vaginal discharge present. No erythema, tenderness, bleeding, lesions or prolapsed vaginal walls. Cervix: No cervical motion tenderness, discharge, friability, lesion, erythema, cervical bleeding or eversion. Uterus: Not deviated, not enlarged, not fixed, not tender and no uterine prolapse. Adnexa:         Right: No mass, tenderness or fullness. Left: No mass, tenderness or fullness. Rectum: No external hemorrhoid. Comments: Normal urethral meatus, nl urethra, nl bladder. Musculoskeletal:         General: No tenderness. Normal range of motion. Cervical back: Normal range of motion. Skin:     General: Skin is warm and dry. Coloration: Skin is not pale. Findings: No erythema or rash. Neurological:      Mental Status: She is alert and oriented to person, place, and time. Psychiatric:         Behavior: Behavior normal.         Thought Content: Thought content normal.         Judgment: Judgment normal.       ASSESSMENT/PLAN:  Pelvic pain  - US NON OB TRANSVAGINAL; Future  - US PELVIS COMPLETE; Future    2. Right ovarian cyst  - US NON OB TRANSVAGINAL; Future  - US PELVIS COMPLETE; Future    3. Vaginal discharge  - Wet prep, genital  - Culture, Genital    4.  Vulvar irritation  - Wet prep, genital  - Culture, Genital

## 2022-09-14 DIAGNOSIS — N30.00 ACUTE CYSTITIS WITHOUT HEMATURIA: ICD-10-CM

## 2022-09-14 NOTE — TELEPHONE ENCOUNTER
Future Appointments   Date Time Provider Zara Gaspar   9/29/2022  1:30 PM Ramy John MD [facility]   LOV 8/9/2022

## 2022-09-15 RX ORDER — NITROFURANTOIN 25; 75 MG/1; MG/1
CAPSULE ORAL
Qty: 14 CAPSULE | Refills: 0 | OUTPATIENT
Start: 2022-09-15

## 2022-09-20 ENCOUNTER — HOSPITAL ENCOUNTER (OUTPATIENT)
Dept: ULTRASOUND IMAGING | Age: 47
Discharge: HOME | End: 2022-09-20
Payer: COMMERCIAL

## 2022-09-20 DIAGNOSIS — N83.201 RIGHT OVARIAN CYST: ICD-10-CM

## 2022-09-20 DIAGNOSIS — R10.2 PELVIC PAIN: ICD-10-CM

## 2022-09-20 PROCEDURE — 76856 US EXAM PELVIC COMPLETE: CPT

## 2022-09-20 PROCEDURE — 76830 TRANSVAGINAL US NON-OB: CPT

## 2022-09-26 ENCOUNTER — TELEPHONE (OUTPATIENT)
Dept: GYNECOLOGY | Age: 47
End: 2022-09-26

## 2022-09-26 NOTE — TELEPHONE ENCOUNTER
Patient called would like someone to call her with her test results from her Pelvic [facility] please  [phone]    [facility]

## 2022-09-26 NOTE — TELEPHONE ENCOUNTER
Patient advised of results, will discuss further with Dr. Della Miller at her visit on Thursday DONE

## 2022-09-29 ENCOUNTER — OFFICE VISIT (OUTPATIENT)
Dept: GYNECOLOGY | Age: 47
End: 2022-09-29
Payer: COMMERCIAL

## 2022-09-29 VITALS
DIASTOLIC BLOOD PRESSURE: 78 MMHG | BODY MASS INDEX: 24.84 KG/M2 | RESPIRATION RATE: 17 BRPM | OXYGEN SATURATION: 98 % | SYSTOLIC BLOOD PRESSURE: 122 MMHG | WEIGHT: 140.2 LBS | TEMPERATURE: 96.9 F | HEART RATE: 62 BPM

## 2022-09-29 DIAGNOSIS — N83.201 RIGHT OVARIAN CYST: ICD-10-CM

## 2022-09-29 DIAGNOSIS — Z01.419 WELL WOMAN EXAM WITH ROUTINE GYNECOLOGICAL EXAM: Primary | ICD-10-CM

## 2022-09-29 DIAGNOSIS — K59.09 OTHER CONSTIPATION: ICD-10-CM

## 2022-09-29 PROCEDURE — 99396 PREV VISIT EST AGE 40-64: CPT | Performed by: OBSTETRICS & GYNECOLOGY

## 2022-10-02 ASSESSMENT — ENCOUNTER SYMPTOMS
CONSTIPATION: 1
EYES NEGATIVE: 1
ALLERGIC/IMMUNOLOGIC NEGATIVE: 1
RESPIRATORY NEGATIVE: 1

## 2022-10-02 NOTE — PROGRESS NOTES
Subjective:      Patient ID: [place] is a 52 y.o. female. Patient is here for annual. Patient with hx right ovarian cyst    Pelvic Pain    Gynecologic Exam      Review of Systems   Constitutional: Negative. HENT: Negative. Eyes: Negative. Respiratory: Negative. Cardiovascular: Negative. Gastrointestinal:  Positive for constipation. Endocrine: Negative. Genitourinary:  Positive for pelvic pain. Musculoskeletal: Negative. Skin: Negative. Allergic/Immunologic: Negative. Neurological: Negative. Hematological: Negative. Psychiatric/Behavioral: Negative.        Date of Birth 1975  Past Medical History:   Diagnosis Date    Bacterial vaginosis     Bartholin's cyst     Bell's palsy     Fibroid     hx [facility]    Necrotizing pancreatitis      Past Surgical History:   Procedure Laterality Date    BARTHOLIN GLAND CYST EXCISION       SECTION      DILATION AND CURETTAGE OF UTERUS      UTERINE FIBROID EMBOLIZATION       OB History    Para Term  AB Living   3 3 3     3   SAB IAB Ectopic Molar Multiple Live Births                    # Outcome Date GA Lbr Alexandr/2nd Weight Sex Delivery Anes PTL Lv   3 Term     M CS-LTranv      2 Term     M CS-LTranv      1 Term     M CS-LTranv        Social History     Socioeconomic History    Marital status: Single     Spouse name: Not on file    Number of children: Not on file    Years of education: Not on file    Highest education level: Not on file   Occupational History    Not on file   Tobacco Use    Smoking status: Former     Types: Cigarettes     Quit date: 2007     Years since quitting: 15.2    Smokeless tobacco: Never   Vaping Use    Vaping Use: Never used   Substance and Sexual Activity    Alcohol use: No    Drug use: No    Sexual activity: Not Currently     Partners: Male   Other Topics Concern    Not on file   Social History Narrative    Not on file     Social Determinants of Health     Financial Resource Strain: Not on file   Food Insecurity: Not on file   Transportation Needs: Not on file   Physical Activity: Not on file   Stress: Not on file   Social Connections: Not on file   Intimate Partner Violence: Not on file   Housing Stability: Not on file     Allergies   Allergen Reactions    Shellfish-Derived Products Itching     No outpatient medications have been marked as taking for the 9/29/22 encounter (Office Visit) with Jana Harvey MD.     No family history on file. /78 (Site: Right Upper Arm, Position: Sitting, Cuff Size: Small Adult)   Pulse 62   Temp 96.9 °F (36.1 °C)   Resp 17   Wt 140 lb 3.2 oz (63.6 kg)   SpO2 98%   BMI 24.84 kg/m²       Objective:   Physical Exam  Constitutional:       Appearance: Normal appearance. She is well-developed and normal weight. HENT:      Head: Normocephalic and atraumatic. Nose: Nose normal.      Mouth/Throat:      Mouth: Mucous membranes are moist.      Pharynx: Oropharynx is clear. Eyes:      Extraocular Movements: Extraocular movements intact. Neck:      Thyroid: No thyromegaly. Cardiovascular:      Rate and Rhythm: Normal rate and regular rhythm. Pulses: Normal pulses. Heart sounds: Normal heart sounds. No murmur heard. No friction rub. No gallop. Pulmonary:      Effort: Pulmonary effort is normal. No respiratory distress. Breath sounds: Normal breath sounds. No stridor. No wheezing, rhonchi or rales. Chest:      Chest wall: No tenderness. Breasts:     Right: Normal. No swelling, bleeding, inverted nipple, mass, nipple discharge, skin change or tenderness. Left: Normal. No swelling, bleeding, inverted nipple, mass, nipple discharge, skin change or tenderness. Abdominal:      General: Bowel sounds are normal. There is no distension. Palpations: Abdomen is soft. There is no mass. Tenderness: There is no abdominal tenderness. There is no guarding or rebound. Hernia: No hernia is present.  There is no hernia in the left inguinal area or right inguinal area. Genitourinary:     General: Normal vulva. Exam position: Lithotomy position. Pubic Area: No rash. Labia:         Right: No rash, tenderness, lesion or injury. Left: No rash, tenderness, lesion or injury. Urethra: No prolapse, urethral pain, urethral swelling or urethral lesion. Vagina: No signs of injury and foreign body. No vaginal discharge, erythema, tenderness, bleeding, lesions or prolapsed vaginal walls. Cervix: No cervical motion tenderness, discharge, friability, lesion, erythema, cervical bleeding or eversion. Uterus: Not deviated, not enlarged, not fixed, not tender and no uterine prolapse. Adnexa:         Right: No mass, tenderness or fullness. Left: No mass, tenderness or fullness. Rectum: No anal fissure or external hemorrhoid. Comments: Normal urethral meatus, normal urethra, nl bladder  Musculoskeletal:         General: No swelling, tenderness, deformity or signs of injury. Normal range of motion. Cervical back: Normal range of motion and neck supple. No rigidity. Lymphadenopathy:      Cervical: No cervical adenopathy. Lower Body: No right inguinal adenopathy. No left inguinal adenopathy. Skin:     General: Skin is warm and dry. Coloration: Skin is not jaundiced or pale. Findings: No bruising, erythema, lesion or rash. Neurological:      General: No focal deficit present. Mental Status: She is alert and oriented to person, place, and time. Mental status is at baseline. Deep Tendon Reflexes: Reflexes are normal and symmetric. Psychiatric:         Mood and Affect: Mood normal.         Behavior: Behavior normal.         Thought Content:  Thought content normal.         Judgment: Judgment normal.       Assessment:   Annual  Right ovarian cyst  constipation      Plan:      Pap, calcium, exercise, mammogram,  Repeat pelvic US  Referral to GI MD-needs colonoscopy, also        Alicia Sequeira MD

## 2022-10-12 ENCOUNTER — HOSPITAL ENCOUNTER (OUTPATIENT)
Age: 47
Discharge: HOME | End: 2022-10-12
Payer: COMMERCIAL

## 2022-10-12 LAB — TSH SERPL DL<=0.05 MIU/L-ACNC: 2.21 UIU/ML (ref 0.27–4.2)

## 2022-10-12 PROCEDURE — 84443 ASSAY THYROID STIM HORMONE: CPT

## 2022-10-12 PROCEDURE — 36415 COLL VENOUS BLD VENIPUNCTURE: CPT

## 2022-11-02 ENCOUNTER — TELEPHONE (OUTPATIENT)
Dept: GYNECOLOGY | Age: 47
End: 2022-11-02

## 2022-11-02 NOTE — TELEPHONE ENCOUNTER
Patient called wants to know if she can have an antibiotic for UTI , says she has the symptoms of a UTI, and can she have a med for a bacterial infection she states she has one of those as well , ask patient what her symptoms and call disconnected , line dropped ?  Tried calling patient back at [phone] was not able to speak with her        [address] [id number] Neisha Licea/ Marek Hdez. [facility] [phone]

## 2022-11-03 NOTE — TELEPHONE ENCOUNTER
Called and left a message for return call.  Called patient to inform her that Dr Rob Thompson would like for her to submit an e- visit through my chart for her UTI issues

## 2022-11-25 ENCOUNTER — HOSPITAL ENCOUNTER (EMERGENCY)
Age: 47
Discharge: HOME | End: 2022-11-25
Payer: COMMERCIAL

## 2022-11-25 VITALS
TEMPERATURE: 97.8 F | OXYGEN SATURATION: 100 % | WEIGHT: 140 LBS | BODY MASS INDEX: 24.8 KG/M2 | HEART RATE: 63 BPM | HEIGHT: 63 IN | RESPIRATION RATE: 18 BRPM | SYSTOLIC BLOOD PRESSURE: 115 MMHG | DIASTOLIC BLOOD PRESSURE: 68 MMHG

## 2022-11-25 DIAGNOSIS — K13.0 ANGULAR CHEILITIS: ICD-10-CM

## 2022-11-25 DIAGNOSIS — Z20.2 POSSIBLE EXPOSURE TO STD: ICD-10-CM

## 2022-11-25 DIAGNOSIS — N89.8 VAGINAL DISCHARGE: ICD-10-CM

## 2022-11-25 DIAGNOSIS — J01.10 ACUTE FRONTAL SINUSITIS, RECURRENCE NOT SPECIFIED: Primary | ICD-10-CM

## 2022-11-25 LAB
BACTERIA WET PREP: NORMAL
BILIRUBIN URINE: NEGATIVE
BLOOD, URINE: NEGATIVE
CLARITY: CLEAR
CLUE CELLS: NORMAL
COLOR: YELLOW
EPITHELIAL CELLS WET PREP: NORMAL
GLUCOSE URINE: NEGATIVE MG/DL
HCG(URINE) PREGNANCY TEST: NEGATIVE
KETONES, URINE: NEGATIVE MG/DL
LEUKOCYTE ESTERASE, URINE: NEGATIVE
MICROSCOPIC EXAMINATION: NORMAL
NITRITE, URINE: NEGATIVE
PH UA: 6 (ref 5–8)
PROTEIN UA: NEGATIVE MG/DL
RBC WET PREP: NORMAL
SOURCE WET PREP: NORMAL
SPECIFIC GRAVITY UA: 1.01 (ref 1–1.03)
TRICHOMONAS PREP: NORMAL
URINE REFLEX TO CULTURE: NORMAL
URINE TYPE: NORMAL
UROBILINOGEN, URINE: 0.2 E.U./DL
WBC WET PREP: NORMAL
YEAST WET PREP: NORMAL

## 2022-11-25 PROCEDURE — 87210 SMEAR WET MOUNT SALINE/INK: CPT

## 2022-11-25 PROCEDURE — 84703 CHORIONIC GONADOTROPIN ASSAY: CPT

## 2022-11-25 PROCEDURE — 81003 URINALYSIS AUTO W/O SCOPE: CPT

## 2022-11-25 PROCEDURE — 6370000000 HC RX 637 (ALT 250 FOR IP): Performed by: PHYSICIAN ASSISTANT

## 2022-11-25 PROCEDURE — 99284 EMERGENCY DEPT VISIT MOD MDM: CPT

## 2022-11-25 PROCEDURE — 6360000002 HC RX W HCPCS: Performed by: PHYSICIAN ASSISTANT

## 2022-11-25 PROCEDURE — 87491 CHLMYD TRACH DNA AMP PROBE: CPT

## 2022-11-25 PROCEDURE — 96372 THER/PROPH/DIAG INJ SC/IM: CPT

## 2022-11-25 PROCEDURE — 87591 N.GONORRHOEAE DNA AMP PROB: CPT

## 2022-11-25 RX ORDER — CEFTRIAXONE 1 G/1
500 INJECTION, POWDER, FOR SOLUTION INTRAMUSCULAR; INTRAVENOUS ONCE
Status: COMPLETED | OUTPATIENT
Start: 2022-11-25 | End: 2022-11-25

## 2022-11-25 RX ORDER — DOXYCYCLINE 100 MG/1
100 TABLET ORAL 2 TIMES DAILY
Qty: 20 TABLET | Refills: 0 | Status: SHIPPED | OUTPATIENT
Start: 2022-11-25 | End: 2022-12-05

## 2022-11-25 RX ORDER — DIAPER,BRIEF,INFANT-TODD,DISP
EACH MISCELLANEOUS
Qty: 1 G | Refills: 0 | Status: SHIPPED | OUTPATIENT
Start: 2022-11-25 | End: 2022-12-02

## 2022-11-25 RX ORDER — LORATADINE 10 MG/1
10 TABLET ORAL DAILY
Qty: 20 TABLET | Refills: 0 | Status: SHIPPED | OUTPATIENT
Start: 2022-11-25

## 2022-11-25 RX ORDER — METRONIDAZOLE 250 MG/1
2000 TABLET ORAL ONCE
Status: COMPLETED | OUTPATIENT
Start: 2022-11-25 | End: 2022-11-25

## 2022-11-25 RX ADMIN — CEFTRIAXONE SODIUM 500 MG: 1 INJECTION, POWDER, FOR SOLUTION INTRAMUSCULAR; INTRAVENOUS at 10:35

## 2022-11-25 RX ADMIN — METRONIDAZOLE 2000 MG: 250 TABLET ORAL at 10:36

## 2022-11-25 ASSESSMENT — ENCOUNTER SYMPTOMS
SINUS PRESSURE: 1
COUGH: 0
SORE THROAT: 1
VOMITING: 0
TROUBLE SWALLOWING: 0
NAUSEA: 0
RHINORRHEA: 1
COLOR CHANGE: 0
BACK PAIN: 0
STRIDOR: 0
WHEEZING: 0
DIARRHEA: 0
VOICE CHANGE: 0
ABDOMINAL PAIN: 0
SHORTNESS OF BREATH: 0
SINUS PAIN: 0
CONSTIPATION: 0

## 2022-11-25 ASSESSMENT — PAIN - FUNCTIONAL ASSESSMENT: PAIN_FUNCTIONAL_ASSESSMENT: NONE - DENIES PAIN

## 2022-11-25 ASSESSMENT — LIFESTYLE VARIABLES
HOW OFTEN DO YOU HAVE A DRINK CONTAINING ALCOHOL: NEVER
HOW MANY STANDARD DRINKS CONTAINING ALCOHOL DO YOU HAVE ON A TYPICAL DAY: PATIENT DOES NOT DRINK

## 2022-11-25 NOTE — ED PROVIDER NOTES
disagreements were addressed in the HPI. REVIEW OF SYSTEMS    (2-9 systems for level 4, 10 or more for level 5)     Review of Systems   Constitutional:  Negative for chills and fever. HENT:  Positive for congestion, mouth sores, postnasal drip, rhinorrhea, sinus pressure and sore throat. Negative for dental problem, drooling, ear discharge, ear pain, sinus pain, tinnitus, trouble swallowing and voice change. Eyes:  Negative for visual disturbance. Respiratory:  Negative for cough, shortness of breath, wheezing and stridor. Cardiovascular:  Negative for chest pain, palpitations and leg swelling. Gastrointestinal:  Negative for abdominal pain, constipation, diarrhea, nausea and vomiting. Endocrine: Negative for polydipsia, polyphagia and polyuria. Genitourinary:  Positive for dysuria, frequency and vaginal discharge. Negative for decreased urine volume, difficulty urinating, flank pain, genital sores, hematuria, menstrual problem, pelvic pain, urgency, vaginal bleeding and vaginal pain. Musculoskeletal:  Negative for back pain, neck pain and neck stiffness. Skin:  Negative for color change, pallor, rash and wound. Neurological:  Positive for headaches. Negative for dizziness, tremors, seizures, syncope, facial asymmetry, speech difficulty, weakness, light-headedness and numbness. Psychiatric/Behavioral:  Negative for confusion. All other systems reviewed and are negative. Positives and Pertinent negatives as per HPI. Except as noted above in the ROS, all other systems were reviewed and negative.        PAST MEDICAL HISTORY     Past Medical History:   Diagnosis Date    Bacterial vaginosis     Bartholin's cyst     Bell's palsy     Fibroid     hx [facility]    Necrotizing pancreatitis          SURGICAL HISTORY     Past Surgical History:   Procedure Laterality Date    BARTHOLIN GLAND CYST EXCISION       SECTION      DILATION AND CURETTAGE OF UTERUS      UTERINE FIBROID EMBOLIZATION CURRENTMEDICATIONS       Previous Medications    ACYCLOVIR (ZOVIRAX) 400 MG TABLET        ALBUTEROL SULFATE HFA (PROVENTIL HFA) 108 (90 BASE) MCG/ACT INHALER    Inhale 2 puffs into the lungs every 4 hours as needed for Wheezing or Shortness of Breath With spacer (and mask if indicated). Thanks. CLINDAMYCIN (CLEOCIN) 2 % VAGINAL CREAM    Place 1 applicator vaginally Twice a Week Place vaginally nightly for 6 months. LIDOCAINE (LIDODERM) 5 %    Place 1 patch onto the skin every 12 hours 12 hours on, 12 hours off. Max 3 patches at time    MELOXICAM (MOBIC) 15 MG TABLET    Take 1 tablet by mouth daily Start after completing Medrol. METRONIDAZOLE (METROGEL VAGINAL) 0.75 % VAGINAL GEL    Place 1 Applicatorful vaginally every other day    POLYETHYLENE GLYCOL (MIRALAX) POWDER    Take 17 g by mouth 2 times daily as needed (constipation)    RESTASIS 0.05 % OPHTHALMIC EMULSION        SODIUM POLYSTYRENE (SPS) 15 GM/60ML SUSPENSION    Take 60 mLs by mouth daily for 3 days    TIZANIDINE (ZANAFLEX) 4 MG TABLET    TAKE 1 TABLET BY MOUTH THREE TIMES DAILY AS NEEDED FOR MUSCLE SPASMS OR TIGHTNESS    VALACYCLOVIR (VALTREX) 500 MG TABLET             ALLERGIES     Shellfish-derived products    FAMILYHISTORY     History reviewed. No pertinent family history.        SOCIAL HISTORY       Social History     Tobacco Use    Smoking status: Former     Types: Cigarettes     Quit date: 6/25/2007     Years since quitting: 15.4    Smokeless tobacco: Never   Vaping Use    Vaping Use: Never used   Substance Use Topics    Alcohol use: No    Drug use: No       SCREENINGS    [place] Coma Scale  Eye Opening: Spontaneous  Best Verbal Response: Oriented  Best Motor Response: Obeys commands  [place] Coma Scale Score: 15        PHYSICAL EXAM    (up to 7 for level 4, 8 or more for level 5)     ED Triage Vitals [11/25/22 1020]   BP Temp Temp Source Heart Rate Resp SpO2 Height Weight   115/68 97.8 °F (36.6 °C) Oral 63 18 100 % 5' 3\" (1.6 m) 140 lb (63.5 kg)       Physical Exam  Vitals and nursing note reviewed. Constitutional:       Appearance: Normal appearance. She is well-developed. She is not toxic-appearing or diaphoretic. HENT:      Head: Normocephalic and atraumatic. Jaw: There is normal jaw occlusion. No trismus, tenderness, swelling, pain on movement or malocclusion. Salivary Glands: Right salivary gland is not diffusely enlarged or tender. Left salivary gland is not diffusely enlarged or tender. Right Ear: Hearing, tympanic membrane, ear canal and external ear normal.      Left Ear: Hearing, tympanic membrane, ear canal and external ear normal.      Nose: Congestion present. Right Nostril: No epistaxis or septal hematoma. Left Nostril: No epistaxis or septal hematoma. Right Sinus: Frontal sinus tenderness present. No maxillary sinus tenderness. Left Sinus: Frontal sinus tenderness present. No maxillary sinus tenderness. Mouth/Throat:      Lips: Pink. No lesions. Mouth: Mucous membranes are moist. No angioedema. Dentition: No dental tenderness, gingival swelling, dental caries, dental abscesses or gum lesions. Tongue: No lesions. Tongue does not deviate from midline. Palate: No mass and lesions. Pharynx: Oropharynx is clear. Uvula midline. No pharyngeal swelling, oropharyngeal exudate, posterior oropharyngeal erythema or uvula swelling. Tonsils: No tonsillar exudate or tonsillar abscesses. 0 on the right. 0 on the left. Eyes:      General: No scleral icterus. Right eye: No discharge. Left eye: No discharge. Extraocular Movements: Extraocular movements intact. Conjunctiva/sclera: Conjunctivae normal.      Pupils: Pupils are equal, round, and reactive to light. Cardiovascular:      Rate and Rhythm: Normal rate. Pulmonary:      Effort: Pulmonary effort is normal.      Breath sounds: Normal breath sounds.    Abdominal:      General: Bowel sounds are normal. There is no distension or abdominal bruit. Palpations: Abdomen is soft. There is no pulsatile mass. Tenderness: There is no abdominal tenderness. There is no right CVA tenderness, left CVA tenderness, guarding or rebound. Negative signs include Allison's sign, Rovsing's sign, McBurney's sign, psoas sign and obturator sign. Genitourinary:     Comments: Patient declines pelvic. Prefers to self swab. Musculoskeletal:         General: Normal range of motion. Cervical back: Normal range of motion. Skin:     General: Skin is warm and dry. Capillary Refill: Capillary refill takes less than 2 seconds. Coloration: Skin is not jaundiced or pale. Findings: No bruising, erythema, lesion or rash. Neurological:      General: No focal deficit present. Mental Status: She is alert and oriented to person, place, and time. Cranial Nerves: No cranial nerve deficit (II-XII intact). Psychiatric:         Behavior: Behavior normal.       DIAGNOSTIC RESULTS   LABS:    Labs Reviewed   C.TRACHOMATIS N.GONORRHOEAE DNA   URINALYSIS WITH REFLEX TO CULTURE   PREGNANCY, URINE       When ordered only abnormal lab results are displayed. All other labs were within normal range or not returned as of this dictation. EKG: When ordered, EKG's are interpreted by the Emergency Department Physician in the absence of a cardiologist.  Please see their note for interpretation of EKG. RADIOLOGY:   Non-plain film images such as CT, Ultrasound and MRI are read by the radiologist. Plain radiographic images are visualized and preliminarily interpreted by the ED Provider with the below findings:        Interpretation per the Radiologist below, if available at the time of this note:    No orders to display     No results found.         PROCEDURES   Unless otherwise noted below, none     Procedures    CRITICAL CARE TIME   N/a    CONSULTS:  None      EMERGENCY DEPARTMENT COURSE and DIFFERENTIAL DIAGNOSIS/MDM:   Vitals:    Vitals:    11/25/22 1020   BP: 115/68   Pulse: 63   Resp: 18   Temp: 97.8 °F (36.6 °C)   TempSrc: Oral   SpO2: 100%   Weight: 140 lb (63.5 kg)   Height: 5' 3\" (1.6 m)       Patient was given the following medications:  Medications   cefTRIAXone (ROCEPHIN) injection 500 mg (500 mg IntraMUSCular Given 11/25/22 1035)   metroNIDAZOLE (FLAGYL) tablet 2,000 mg (2,000 mg Oral Given 11/25/22 1036)         Is this patient to be included in the SEP-1 Core Measure due to severe sepsis or septic shock? No   Exclusion criteria - the patient is NOT to be included for SEP-1 Core Measure due to:  2+ SIRS criteria are not met    This patient presents to the emergency department with numerous complaints. She reports sinus congestion and headache. Be sent home with decongestant. She also presents with what she thought might be a cold sore on the lip. Given the location and appearance of this, it appears more consistent with angular cheilitis. He gets worse after she wears masks. She may have a little bit of cortisone cream over this area to decrease inflammation. Otherwise, be sure to clean masks properly and may want to apply some Aquaphor to the area as a barrier during the winter months. Patient also reports chronic vaginal discharge. She would like to be tested and empirically treated for STD. She may follow-up additionally with her gynecologist.  Abdomen is soft and nontender in all 4 quadrants without pulsatile mass or CVA tenderness.   My suspicion is low for life threatening skin infection, SJS, EM, TEN, meningococcemia, Kawasaki disease, abscess, cellulitis, dermatitis, lymphangitis, lymphadenitis, RMSF, vasculitis, Lyme disease, HSP, angioedema, anaphylaxis, malar rash, bullous pemphigoid, pemphigus vulgaris, tinea, candidiasis, syphilis, thrombocytopenia, HIT, scalded skin syndrome, scarlet fever, janeway lesions, osler nodes, candidiasis, erysipelas, impetigo, shingles, roseola, rubella, measles, chicken pox, sepsis, burn, hives, hand-foot-mouth disease, parvovirus slap cheek rash,  acute surgical abdomen, obstruction, perforation, abscess, mesenteric ischemia, AAA, dissection, cholecystitis, cholangitis, pancreatitis, appendicitis, C. diff colitis, diverticulitis, volvulus, incarcerated hernia, necrotizing fasciitis, TOA, ovarian torsion, PID, ectopic pregnancy, kaye mary Zacarias syndrome, help syndrome, preeclampsia,incarcerated hernia, Mane gangrene, pyelonephritis, perinephric abscess, kidney stone, urosepsis, fistula, intussusception, pyloric stenosis, covid19, carotid dissection, sinus abscess, acute fracture, acute CVA, ICH, SAH, TIA, meningitis, encephalitis, pseudotumor cerebri, temporal arteritis, sentinel bleed from ruptured aneurysm, hypertensive urgency or emergency, subdural hematoma, epidural hematoma, cerebellar compromise, posterior stroke, Chiari malformation, hydrocephalus, skull or facial fracture, postconcussive syndrome,  ACS, PE, myocarditis, pericarditis, endocarditis, acute pulmonary edema, pleural effusion, pericardial effusion, cardiac tamponade, cardiomyopathy, CHF exacerbation, thoracic aortic dissection, esophageal rupture, other life-threatening arrhythmia, hypertensive urgency or emergency, hemothorax, pulmonary contusion, subcutaneous emphysema, flail chest, pneumo mediastinum, rib fracture, pneumonia, pneumothorax, pertussis, RSV, influenza, ARDS, severe asthma exacerbation, severe COPD exacerbation or other concerning pathology. FINAL IMPRESSION      1. Acute frontal sinusitis, recurrence not specified    2. Angular cheilitis    3. Vaginal discharge    4.  Possible exposure to STD          DISPOSITION/PLAN   DISPOSITION Discharge - Pending Orders Complete 11/25/2022 10:31:32 AM      PATIENT REFERRED TO:  Eve Gordon MD  [address] [place] [address]  [id number]  [address] [place] [id number]  [phone]    In 3 days      [facility] [address] [facility]  [phone]    If symptoms worsen    follow up with your gynecologist            DISCHARGE MEDICATIONS:  New Prescriptions    DOXYCYCLINE MONOHYDRATE (ADOXA) 100 MG TABLET    Take 1 tablet by mouth 2 times daily for 10 days    HYDROCORTISONE 1 % CREAM    Apply topically 2 -3 times daily for 5 - 7 days.     LORATADINE (CLARITIN) 10 MG TABLET    Take 1 tablet by mouth daily       DISCONTINUED MEDICATIONS:  Discontinued Medications    No medications on file              (Please note that portions of this note were completed with a voice recognition program.  Efforts were made to edit the dictations but occasionally words are mis-transcribed.)    Matt Contreras PA-C (electronically signed)            Matt Contreras PA-C  11/25/22 [id number]

## 2022-11-28 LAB
C TRACH DNA GENITAL QL NAA+PROBE: NEGATIVE
N. GONORRHOEAE DNA: NEGATIVE

## 2022-12-07 DIAGNOSIS — N30.00 ACUTE CYSTITIS WITHOUT HEMATURIA: ICD-10-CM

## 2022-12-07 RX ORDER — NITROFURANTOIN 25; 75 MG/1; MG/1
CAPSULE ORAL
Qty: 14 CAPSULE | Refills: 0 | Status: SHIPPED | OUTPATIENT
Start: 2022-12-07

## 2022-12-29 ENCOUNTER — TELEPHONE (OUTPATIENT)
Dept: ORTHOPEDIC SURGERY | Age: 47
End: 2022-12-29

## 2022-12-29 DIAGNOSIS — M51.26 LUMBAR DISCOGENIC PAIN SYNDROME: Primary | ICD-10-CM

## 2022-12-29 DIAGNOSIS — M51.36 DDD (DEGENERATIVE DISC DISEASE), LUMBAR: ICD-10-CM

## 2022-12-29 RX ORDER — MELOXICAM 15 MG/1
15 TABLET ORAL DAILY
Qty: 90 TABLET | Refills: 1 | Status: CANCELLED | OUTPATIENT
Start: 2022-12-29

## 2022-12-29 RX ORDER — TIZANIDINE 4 MG/1
4 TABLET ORAL EVERY 8 HOURS PRN
Qty: 40 TABLET | Refills: 2 | Status: CANCELLED | OUTPATIENT
Start: 2022-12-29

## 2022-12-29 NOTE — TELEPHONE ENCOUNTER
Patient aware refill not possible at this time due to lack of follow up.  She will call back to schedule a follow up in office

## 2022-12-29 NOTE — TELEPHONE ENCOUNTER
General Question     Subject: patient is requesting a call back regarding medication  Patient and /or Facility Request: Kassie Stahl, [address] Number: [phone]

## 2022-12-29 NOTE — TELEPHONE ENCOUNTER
Patient requesting refill on Tizanidine.      Last fill 8/15/22  Pain - 8/10  Pills remaining- 0     Please sign if appropriate

## 2022-12-29 NOTE — TELEPHONE ENCOUNTER
Mindy Favre, MD  You 11 minutes ago (1:49 PM)     EM  He has bee lost from follow up   He will need an appointment

## 2023-01-04 ENCOUNTER — TELEPHONE (OUTPATIENT)
Dept: ORTHOPEDIC SURGERY | Age: 48
End: 2023-01-04

## 2023-01-04 ENCOUNTER — OFFICE VISIT (OUTPATIENT)
Dept: ORTHOPEDIC SURGERY | Age: 48
End: 2023-01-04

## 2023-01-04 VITALS — WEIGHT: 139.99 LBS | HEIGHT: 63 IN | BODY MASS INDEX: 24.8 KG/M2

## 2023-01-04 DIAGNOSIS — M51.26 HERNIATION OF INTERVERTEBRAL DISC OF LUMBAR SPINE: ICD-10-CM

## 2023-01-04 DIAGNOSIS — M47.816 LUMBAR SPONDYLOSIS: ICD-10-CM

## 2023-01-04 DIAGNOSIS — M51.36 DDD (DEGENERATIVE DISC DISEASE), LUMBAR: ICD-10-CM

## 2023-01-04 DIAGNOSIS — M54.10 RADICULAR PAIN OF LEFT LOWER EXTREMITY: ICD-10-CM

## 2023-01-04 RX ORDER — TIZANIDINE 4 MG/1
4 TABLET ORAL EVERY 8 HOURS PRN
Qty: 40 TABLET | Refills: 1 | Status: SHIPPED | OUTPATIENT
Start: 2023-01-04

## 2023-01-04 RX ORDER — MELOXICAM 15 MG/1
15 TABLET ORAL DAILY
Qty: 30 TABLET | Refills: 2 | Status: SHIPPED | OUTPATIENT
Start: 2023-01-04

## 2023-01-04 RX ORDER — TRAMADOL HYDROCHLORIDE 50 MG/1
50 TABLET ORAL EVERY 6 HOURS PRN
Qty: 20 TABLET | Refills: 0 | Status: SHIPPED | OUTPATIENT
Start: 2023-01-04 | End: 2023-01-11

## 2023-01-04 RX ORDER — PREDNISONE 50 MG/1
50 TABLET ORAL DAILY
Qty: 6 TABLET | Refills: 0 | Status: SHIPPED | OUTPATIENT
Start: 2023-01-04 | End: 2023-01-10

## 2023-01-04 NOTE — PROGRESS NOTES
Chief Complaint:   Chief Complaint   Patient presents with    Lower Back Pain     F/U MRI TR Lumbar, still having pain on and off. Pain increased in the morning and prolonged sitting. Radiates down L leg, achy and pins and needles. History of Present Illness:       Patient is a 52 y.o. female returns follow up for the above complaint. The patient was last seen approximately 5 monthsago and lost to follow-up. The symptoms are worsening since the last visit. The patient has had further testing for the problem. MRI completed in the interim. Good response to the trial of Steroids in the remote past.    Back:Leftleg pain 50:50 . Pain back and leg is aching or pins-and-needles and numbness in quality. Limb pain follows a L4 dermatomal distribution radiating to the knee    The symptoms do follow a discogenic provocative pattern. Pain levels: 6-10 . The patient denies new onset or progressive weakness of the lower extremities. The patient denies now onset bowel or bladder function. She has no refills of medication previously prescribed to her                Past Medical History:        Past Medical History:   Diagnosis Date    Bacterial vaginosis     Bartholin's cyst     Bell's palsy     Fibroid     hx [facility]    Necrotizing pancreatitis         Present Medications:         Current Outpatient Medications   Medication Sig Dispense Refill    tiZANidine (ZANAFLEX) 4 MG tablet Take 1 tablet by mouth every 8 hours as needed (Muscle tightness/spasm) 40 tablet 1    meloxicam (MOBIC) 15 MG tablet Take 1 tablet by mouth daily Start after completing prednisone 30 tablet 2    predniSONE (DELTASONE) 50 MG tablet Take 1 tablet by mouth daily for 6 days 6 tablet 0    traMADol (ULTRAM) 50 MG tablet Take 1 tablet by mouth every 6 hours as needed for Pain for up to 7 days. Max Daily Amount: 200 mg 20 tablet 0    lidocaine (LIDODERM) 5 % Place 1 patch onto the skin in the morning and 1 patch in the evening.  12 hours on, 12 hours off. Max 3 patches at time. 30 patch 1    nitrofurantoin, macrocrystal-monohydrate, (MACROBID) 100 MG capsule TAKE ONE CAPSULE BY MOUTH EVERY MORNING AND TAKE ONE CAPSULE BY MOUTH BEFORE BEDTIME FOR 7 DAYS 14 capsule 0    loratadine (CLARITIN) 10 MG tablet Take 1 tablet by mouth daily 20 tablet 0    metroNIDAZOLE (METROGEL VAGINAL) 0.75 % vaginal gel Place 1 Applicatorful vaginally every other day 70 g 5    acyclovir (ZOVIRAX) 400 MG tablet       RESTASIS 0.05 % ophthalmic emulsion       valACYclovir (VALTREX) 500 MG tablet       sodium polystyrene (SPS) 15 GM/60ML suspension Take 60 mLs by mouth daily for 3 days 180 mL 0    albuterol sulfate HFA (PROVENTIL HFA) 108 (90 Base) MCG/ACT inhaler Inhale 2 puffs into the lungs every 4 hours as needed for Wheezing or Shortness of Breath With spacer (and mask if indicated). Thanks. 1 Inhaler 0    polyethylene glycol (MIRALAX) powder Take 17 g by mouth 2 times daily as needed (constipation) 510 g 0     No current facility-administered medications for this visit. Allergies: Allergies   Allergen Reactions    Shellfish-Derived Products Itching           Review of Systems:    Pertinent items are noted in HPI        Vital Signs: There were no vitals filed for this visit. General Exam:     Constitutional: Patient is adequately groomed with no evidence of malnutrition    Physical Exam: lower back      Primary Exam:    Inspection: Pulmonary atrophy appreciable curvature      Palpation: No focal trigger point tenderness      Range of Motion: 90/15 pain with extension      Strength: Normal lower extremity      Special Tests: SLR negative      Skin: There are no rashes, ulcerations or lesions. Gait: Nonantalgic         Additional Comments:        Additional Examinations:           Neurolgic -Light touch sensation and manual muscle testing normal L2-S1. No fasiculations. Pattella tendon and Achilles tendon reflexes +2 bilaterally.   Seated SLR negative           Office Imaging Results/Procedures PerformedToday:          Office Procedures:     Orders Placed This Encounter   Procedures    [facility]     Referral Priority:   Routine     Referral Type:   Eval and Treat     Referral Reason:   Specialty Services Required     Requested Specialty:   Physical Therapist     Number of Visits Requested:   1           Other Outside Imaging and Testing Personally Reviewed:        [id number] [facility]   chris Gonzalez, [address]           Patient Name: Landy Yoo   Case ID: [id number]   Patient : 1975   Referring Physician: Ronny Cifuentes MD   Exam Date: 2022   Exam Description: MR Lumbar Spine w/o Contrast            HISTORY:  Lumbar discogenic pain syndrome per script. F/u: n/s. Patient has had low back pain    since 2022, pain into left leg, same with time, coached on motion throughout,    uncooperative patient; no sx; no ca. TECHNICAL FACTORS:  Long- and short-axis fat- and water-weighted images were performed. COMPARISON:  None. FINDINGS:  For purposes of this dictation, the lowest complete intervertebral disc space is    designated L5-S1.       OSSEOUS STRUCTURES:   Mild L2 on L3 and L3 on L4 anterolisthesis. Vertebral body heights are preserved without    compression deformity. Background marrow signal is normal without infiltrative marrow process. No suspicious osseous lesions are identified. SPINAL CANAL:   The conus is normal in caliber and signal, terminating at the L1 level. No canal collection or    mass is identified. There is normal distribution of the nerve roots in the thecal sac. SOFT TISSUES:   The prevertebral and paraspinous soft tissues are unremarkable.        DISC SPACES:   Discs are preserved in height and signal. Level-by-level analysis demonstrates the following:       T12-L1 (included on sagittal images only): No disc bulge or herniation. No spinal canal or    neural foraminal narrowing. L1-L2 (included on sagittal images only): Mild anterolisthesis. Small spondylotic disc bulge. No spinal canal or neural foraminal narrowing. L2-L3: Mild anterolisthesis. Small disc bulge. No spinal canal stenosis. Mild bilateral    neural foraminal narrowing. Moderate bilateral facet arthropathy. L3-L4: Mild anterolisthesis with a superimposed left subarticular superiorly migrating disc    extrusion type herniation/free fragment narrowing the left lateral recess and impinging on the    descending left L4 nerve root. Moderate left neural foraminal narrowing with displacement of    the exiting left L3 nerve root. Severe bilateral facet arthropathy with ligamentum flavum    infolding. Mild spinal canal stenosis. L4-L5: Spondylotic disc bulge. Mild spinal canal stenosis. Mild bilateral neural foraminal    narrowing. Mild bilateral facet arthropathy. L5-S1: No disc bulge or herniation. No spinal canal or neural foraminal narrowing. Mild    bilateral facet arthropathy. CONCLUSION:   A left L3-4 subarticular superiorly migrating disc extrusion type herniation/free fragment    impinging on the descending left L4 nerve root and displacing the exiting left L3 nerve root in    the foramen. Severe bilateral facet arthropathy. Findings may contribute with patient's    left-sided symptoms. Thank you for the opportunity to provide your interpretation. Karli Mcdowell MD       A: HUAN/BRIAN 09/08/2022 6:46 AM             Assessment   Impression: . Encounter Diagnoses   Name Primary?     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     Radicular pain of left lower extremity     DDD (degenerative disc disease), lumbar     Lumbar spondylosis         Left L3/L4 disc herniation/extrusion superiorly migratinng L3 and L4 nerve root impingement neuroforaminal and lateral recess respectively      Plan:       Commence formal course of PT  High-dose steroids followed by meloxicam  Multimodal medical pain management as needed use of Zanaflex, Lidoderm and tramadol short-term minimize use of narcotics  Consider her candidate for L SUYAPA which she is not interested at this moment in time  Surgical consultation with orthopedic spine surgery as needed       Orders:        Orders Placed This Encounter   Procedures    [facility]     Referral Priority:   Routine     Referral Type:   Eval and Treat     Referral Reason:   Specialty Services Required     Requested Specialty:   Physical Therapist     Number of Visits Requested:   1         Allison Coto MD.      Fozia Billing: \"This note was dictated with voice recognition software. Though review and correction are routine, we apologize for any errors. \"

## 2023-01-04 NOTE — TELEPHONE ENCOUNTER
Prescription Refill     Medication Name:  Lidocaine 5%    Pharmacy: [address] [place] Sherry Andres [facility] [phone] [facility] [phone]     Patient Contact Number:  [phone]

## 2023-01-05 RX ORDER — LIDOCAINE 50 MG/G
1 PATCH TOPICAL EVERY 12 HOURS
Qty: 30 PATCH | Refills: 1 | Status: SHIPPED | OUTPATIENT
Start: 2023-01-05

## 2023-01-27 ENCOUNTER — TELEPHONE (OUTPATIENT)
Dept: GYNECOLOGY | Age: 48
End: 2023-01-27

## 2023-01-27 NOTE — TELEPHONE ENCOUNTER
Patient called into office stating she needs a refill on a medication patient stated she is currently having issues with bacteria vaginosis she also stated that she has odor in her UTI. Advised patient that Dr Beatriz Ryder would like for her submit an e- visit through [employer]. Patient says she is able to submit an e- visit.      Patient can be contacted at [phone]

## 2023-02-01 DIAGNOSIS — M51.26 HERNIATION OF INTERVERTEBRAL DISC OF LUMBAR SPINE: ICD-10-CM

## 2023-02-02 RX ORDER — PREDNISONE 50 MG/1
TABLET ORAL
Qty: 6 TABLET | Refills: 0 | OUTPATIENT
Start: 2023-02-02

## 2023-02-02 RX ORDER — TRAMADOL HYDROCHLORIDE 50 MG/1
50 TABLET ORAL EVERY 8 HOURS PRN
Qty: 21 TABLET | Refills: 0 | Status: SHIPPED | OUTPATIENT
Start: 2023-02-02 | End: 2023-02-09

## 2023-02-02 RX ORDER — METHYLPREDNISOLONE 4 MG/1
TABLET ORAL
Qty: 1 KIT | Refills: 0 | Status: SHIPPED | OUTPATIENT
Start: 2023-02-02

## 2023-02-14 DIAGNOSIS — M51.26 HERNIATION OF INTERVERTEBRAL DISC OF LUMBAR SPINE: ICD-10-CM

## 2023-02-15 RX ORDER — TIZANIDINE 4 MG/1
TABLET ORAL
Qty: 40 TABLET | Refills: 1 | Status: SHIPPED | OUTPATIENT
Start: 2023-02-15

## 2023-02-15 RX ORDER — METHYLPREDNISOLONE 4 MG/1
TABLET ORAL
Qty: 21 TABLET | OUTPATIENT
Start: 2023-02-15

## 2023-02-17 ENCOUNTER — TELEPHONE (OUTPATIENT)
Dept: ORTHOPEDIC SURGERY | Age: 48
End: 2023-02-17

## 2023-02-17 NOTE — TELEPHONE ENCOUNTER
LVM for pt to check status: questions asked were: Did pt have response to steroid pack previously prescribed? If tramadol is to be refilled, we need to know #of pills remaining from previous fill of tramadol, and pain level 0-10. Requested call back from pt to proceed with processing refill or a [employer] message with the answers to the above questions.

## 2023-02-17 NOTE — TELEPHONE ENCOUNTER
General Question     Subject: PATIENT REQUESTING A CALL FROM ABELARDO ABOUT HER REFILL.   Patient: Rowdy Nguyễn  Contact Number: [phone]

## 2023-02-22 RX ORDER — TRAMADOL HYDROCHLORIDE 50 MG/1
TABLET ORAL
Qty: 21 TABLET | OUTPATIENT
Start: 2023-02-22

## 2023-04-03 DIAGNOSIS — M51.26 HERNIATION OF INTERVERTEBRAL DISC OF LUMBAR SPINE: ICD-10-CM

## 2023-04-03 RX ORDER — TRAMADOL HYDROCHLORIDE 50 MG/1
TABLET ORAL
Qty: 21 TABLET | OUTPATIENT
Start: 2023-04-03

## 2023-04-03 RX ORDER — MELOXICAM 15 MG/1
TABLET ORAL
Qty: 30 TABLET | Refills: 1 | Status: SHIPPED | OUTPATIENT
Start: 2023-04-03 | End: 2023-05-21 | Stop reason: SDUPTHER

## 2023-04-06 DIAGNOSIS — M47.816 LUMBAR SPONDYLOSIS: ICD-10-CM

## 2023-04-06 DIAGNOSIS — M51.26 HERNIATION OF INTERVERTEBRAL DISC OF LUMBAR SPINE: Primary | ICD-10-CM

## 2023-04-06 DIAGNOSIS — M51.26 LUMBAR DISCOGENIC PAIN SYNDROME: ICD-10-CM

## 2023-04-06 DIAGNOSIS — M54.10 RADICULAR PAIN OF LEFT LOWER EXTREMITY: ICD-10-CM

## 2023-04-06 DIAGNOSIS — M51.36 DDD (DEGENERATIVE DISC DISEASE), LUMBAR: ICD-10-CM

## 2023-04-07 RX ORDER — TRAMADOL HYDROCHLORIDE 50 MG/1
TABLET ORAL
Qty: 21 TABLET | OUTPATIENT
Start: 2023-04-07

## 2023-05-10 DIAGNOSIS — M51.26 HERNIATION OF INTERVERTEBRAL DISC OF LUMBAR SPINE: ICD-10-CM

## 2023-05-10 RX ORDER — LIDOCAINE 50 MG/G
PATCH TOPICAL
Qty: 30 PATCH | Refills: 1 | OUTPATIENT
Start: 2023-05-10

## 2023-05-10 RX ORDER — TIZANIDINE 4 MG/1
TABLET ORAL
Qty: 40 TABLET | Refills: 1 | OUTPATIENT
Start: 2023-05-10

## 2023-05-17 ENCOUNTER — OFFICE VISIT (OUTPATIENT)
Dept: ORTHOPEDIC SURGERY | Age: 48
End: 2023-05-17
Payer: COMMERCIAL

## 2023-05-17 VITALS — WEIGHT: 144.5 LBS | BODY MASS INDEX: 25.6 KG/M2

## 2023-05-17 DIAGNOSIS — M51.26 HERNIATION OF INTERVERTEBRAL DISC OF LUMBAR SPINE: ICD-10-CM

## 2023-05-17 DIAGNOSIS — M17.12 OSTEOARTHRITIS OF LEFT PATELLOFEMORAL JOINT: ICD-10-CM

## 2023-05-17 DIAGNOSIS — M25.562 LEFT KNEE PAIN, UNSPECIFIED CHRONICITY: ICD-10-CM

## 2023-05-17 PROCEDURE — G8419 CALC BMI OUT NRM PARAM NOF/U: HCPCS | Performed by: INTERNAL MEDICINE

## 2023-05-17 PROCEDURE — G8428 CUR MEDS NOT DOCUMENT: HCPCS | Performed by: INTERNAL MEDICINE

## 2023-05-17 PROCEDURE — L1810 KO ELASTIC WITH JOINTS: HCPCS | Performed by: INTERNAL MEDICINE

## 2023-05-17 PROCEDURE — 1036F TOBACCO NON-USER: CPT | Performed by: INTERNAL MEDICINE

## 2023-05-17 PROCEDURE — 99214 OFFICE O/P EST MOD 30 MIN: CPT | Performed by: INTERNAL MEDICINE

## 2023-05-17 RX ORDER — TIZANIDINE 4 MG/1
4 TABLET ORAL EVERY 8 HOURS PRN
Qty: 40 TABLET | Refills: 0 | Status: SHIPPED | OUTPATIENT
Start: 2023-05-17

## 2023-05-17 RX ORDER — LIDOCAINE 50 MG/G
1 PATCH TOPICAL EVERY 12 HOURS
Qty: 30 PATCH | Refills: 1 | Status: SHIPPED | OUTPATIENT
Start: 2023-05-17

## 2023-05-17 RX ORDER — TRAMADOL HYDROCHLORIDE 50 MG/1
50 TABLET ORAL EVERY 6 HOURS PRN
Qty: 20 TABLET | Refills: 0 | Status: SHIPPED | OUTPATIENT
Start: 2023-05-17 | End: 2023-05-24

## 2023-05-21 RX ORDER — MELOXICAM 15 MG/1
15 TABLET ORAL DAILY PRN
Qty: 30 TABLET | Refills: 1 | Status: SHIPPED | OUTPATIENT
Start: 2023-05-21

## 2023-06-27 DIAGNOSIS — M51.26 HERNIATION OF INTERVERTEBRAL DISC OF LUMBAR SPINE: ICD-10-CM

## 2023-07-03 RX ORDER — TIZANIDINE 4 MG/1
TABLET ORAL
Qty: 40 TABLET | Refills: 0 | Status: SHIPPED | OUTPATIENT
Start: 2023-07-03

## 2023-07-03 RX ORDER — TRAMADOL HYDROCHLORIDE 50 MG/1
TABLET ORAL
Qty: 20 TABLET | OUTPATIENT
Start: 2023-07-03

## 2023-07-11 DIAGNOSIS — M51.26 HERNIATION OF INTERVERTEBRAL DISC OF LUMBAR SPINE: ICD-10-CM

## 2023-07-11 RX ORDER — METHYLPREDNISOLONE 4 MG/1
TABLET ORAL
Qty: 21 TABLET | OUTPATIENT
Start: 2023-07-11

## 2023-07-11 RX ORDER — TRAMADOL HYDROCHLORIDE 50 MG/1
TABLET ORAL
Qty: 20 TABLET | OUTPATIENT
Start: 2023-07-11

## 2023-07-11 NOTE — TELEPHONE ENCOUNTER
Requesting refill of tramadol and medrol.     Tramadol-  Pills left: 0  Pain: 9  Last filled: 05/17/2023  Last OV: 05/17/2023  Next OV: N/A      Medrol-  Last filled: 0/02/2023

## 2023-07-14 DIAGNOSIS — M51.26 HERNIATION OF INTERVERTEBRAL DISC OF LUMBAR SPINE: ICD-10-CM

## 2023-07-17 RX ORDER — METHYLPREDNISOLONE 4 MG/1
TABLET ORAL
Qty: 21 TABLET | OUTPATIENT
Start: 2023-07-17

## 2023-07-17 RX ORDER — TRAMADOL HYDROCHLORIDE 50 MG/1
TABLET ORAL
Qty: 20 TABLET | OUTPATIENT
Start: 2023-07-17

## 2023-07-17 NOTE — TELEPHONE ENCOUNTER
Patient requesting a refill of tramadol and medrol. Both scripts were refused on 07/11 due to patient needing an appointment. Tramadol-  Pills left: 0  Pain: 9.5  Last filled: 05/17/2023  Last OV: 05/17/2023  Next OV: 07/20/2023    Medrol-  Last filled: 02/02/2023      Please advise.

## 2023-07-17 NOTE — TELEPHONE ENCOUNTER
I spoke with the patient regarding her refill request. I let her know that Dr. Angle Lord may not be able to refill her medications until she's been seen, but that I would send the request to him for his consideration. I also reminded her of her appointment scheduled on Thursday and provided her with the appointment time and office address.

## 2023-07-18 ENCOUNTER — HOSPITAL ENCOUNTER (EMERGENCY)
Age: 48
Discharge: LEFT BEFORE DISCHARGE | End: 2023-07-18
Payer: COMMERCIAL

## 2023-07-18 VITALS
RESPIRATION RATE: 16 BRPM | TEMPERATURE: 97.7 F | WEIGHT: 142 LBS | SYSTOLIC BLOOD PRESSURE: 113 MMHG | HEIGHT: 63 IN | OXYGEN SATURATION: 100 % | DIASTOLIC BLOOD PRESSURE: 78 MMHG | BODY MASS INDEX: 25.16 KG/M2 | HEART RATE: 63 BPM

## 2023-07-18 DIAGNOSIS — R30.0 DYSURIA: Primary | ICD-10-CM

## 2023-07-18 LAB
BACTERIA GENITAL QL WET PREP: NORMAL
BILIRUB UR QL STRIP.AUTO: NEGATIVE
C TRACH DNA CVX QL NAA+PROBE: NEGATIVE
CLARITY UR: CLEAR
CLUE CELLS SPEC QL WET PREP: NORMAL
COLOR UR: YELLOW
EPI CELLS SPEC QL WET PREP: NORMAL
GLUCOSE UR STRIP.AUTO-MCNC: NEGATIVE MG/DL
HCG UR QL: NEGATIVE
HGB UR QL STRIP.AUTO: NEGATIVE
KETONES UR STRIP.AUTO-MCNC: NEGATIVE MG/DL
LEUKOCYTE ESTERASE UR QL STRIP.AUTO: NEGATIVE
N GONORRHOEA DNA CERV MUCUS QL NAA+PROBE: NEGATIVE
NITRITE UR QL STRIP.AUTO: NEGATIVE
PH UR STRIP.AUTO: 6.5 [PH] (ref 5–8)
PROT UR STRIP.AUTO-MCNC: NEGATIVE MG/DL
RBC SPEC QL WET PREP: NORMAL
SP GR UR STRIP.AUTO: <=1.005 (ref 1–1.03)
SPECIMEN SOURCE FLD: NORMAL
T VAGINALIS GENITAL QL WET PREP: NORMAL
UA COMPLETE W REFLEX CULTURE PNL UR: NORMAL
UA DIPSTICK W REFLEX MICRO PNL UR: NORMAL
URN SPEC COLLECT METH UR: NORMAL
UROBILINOGEN UR STRIP-ACNC: 0.2 E.U./DL
WBC SPEC QL WET PREP: NORMAL
YEAST GENITAL QL WET PREP: NORMAL

## 2023-07-18 PROCEDURE — 96372 THER/PROPH/DIAG INJ SC/IM: CPT

## 2023-07-18 PROCEDURE — 87591 N.GONORRHOEAE DNA AMP PROB: CPT

## 2023-07-18 PROCEDURE — 87491 CHLMYD TRACH DNA AMP PROBE: CPT

## 2023-07-18 PROCEDURE — 84703 CHORIONIC GONADOTROPIN ASSAY: CPT

## 2023-07-18 PROCEDURE — 99284 EMERGENCY DEPT VISIT MOD MDM: CPT

## 2023-07-18 PROCEDURE — 6360000002 HC RX W HCPCS: Performed by: PHYSICIAN ASSISTANT

## 2023-07-18 PROCEDURE — 6370000000 HC RX 637 (ALT 250 FOR IP): Performed by: PHYSICIAN ASSISTANT

## 2023-07-18 PROCEDURE — 87210 SMEAR WET MOUNT SALINE/INK: CPT

## 2023-07-18 PROCEDURE — 2500000003 HC RX 250 WO HCPCS

## 2023-07-18 PROCEDURE — 81003 URINALYSIS AUTO W/O SCOPE: CPT

## 2023-07-18 RX ORDER — CEFTRIAXONE 500 MG/1
500 INJECTION, POWDER, FOR SOLUTION INTRAMUSCULAR; INTRAVENOUS ONCE
Status: COMPLETED | OUTPATIENT
Start: 2023-07-18 | End: 2023-07-18

## 2023-07-18 RX ORDER — DOXYCYCLINE HYCLATE 100 MG
100 TABLET ORAL ONCE
Status: COMPLETED | OUTPATIENT
Start: 2023-07-18 | End: 2023-07-18

## 2023-07-18 RX ADMIN — CEFTRIAXONE SODIUM 500 MG: 1 INJECTION, POWDER, FOR SOLUTION INTRAMUSCULAR; INTRAVENOUS at 11:24

## 2023-07-18 RX ADMIN — LIDOCAINE HYDROCHLORIDE 30 ML: 10 INJECTION, SOLUTION EPIDURAL; INFILTRATION; INTRACAUDAL; PERINEURAL at 11:24

## 2023-07-18 RX ADMIN — DOXYCYCLINE HYCLATE 100 MG: 100 TABLET, COATED ORAL at 11:24

## 2023-07-18 ASSESSMENT — ENCOUNTER SYMPTOMS
COLOR CHANGE: 0
BACK PAIN: 0
DIARRHEA: 0
RESPIRATORY NEGATIVE: 1
COUGH: 0
SHORTNESS OF BREATH: 0
VOMITING: 0
CONSTIPATION: 0
ABDOMINAL PAIN: 1
NAUSEA: 0
CHEST TIGHTNESS: 0

## 2023-07-18 ASSESSMENT — PAIN - FUNCTIONAL ASSESSMENT
PAIN_FUNCTIONAL_ASSESSMENT: ACTIVITIES ARE NOT PREVENTED
PAIN_FUNCTIONAL_ASSESSMENT: 0-10

## 2023-07-18 ASSESSMENT — PAIN DESCRIPTION - PAIN TYPE: TYPE: ACUTE PAIN

## 2023-07-18 ASSESSMENT — PAIN DESCRIPTION - FREQUENCY: FREQUENCY: CONTINUOUS

## 2023-07-18 ASSESSMENT — PAIN SCALES - GENERAL: PAINLEVEL_OUTOF10: 9

## 2023-07-18 ASSESSMENT — PAIN DESCRIPTION - ORIENTATION: ORIENTATION: RIGHT;LEFT

## 2023-07-18 ASSESSMENT — PAIN DESCRIPTION - LOCATION: LOCATION: ABDOMEN;GROIN

## 2023-07-18 ASSESSMENT — PAIN DESCRIPTION - DESCRIPTORS: DESCRIPTORS: ACHING;BURNING

## 2023-07-18 NOTE — ED PROVIDER NOTES
[facility]        Pt Name: Stacey Schwartz  MRN: [id number]  [address] [facility] [place] 1975  Date of evaluation: 7/18/2023  Provider: LUIS DANIEL Spangler  PCP: Rashid Contreras MD  Note Started: 11:38 AM EDT 7/18/23      ERIN. I have evaluated this patient. CHIEF COMPLAINT       Chief Complaint   Patient presents with    Dysuria     Pt came to the hospital due to having a possible UTI for the past month, states that she has lower quadrant abdominal pain, concern for possible STD due to vaginal discharge. HISTORY OF PRESENT ILLNESS: 1 or more Elements     History From: Patient  Limitations to history : None    Stacey Schwartz is a 50 y.o. female with past medical history of fibroids who presents ED with complaint of dysuria. She reports for the past month but worsened over the past several days she has had some dysuria. Patient states she has had some increased urinary frequency and urgency and some cramping to her lower abdomen. She rates the cramping as an 8/10. She states she has had a clear vaginal discharge. Would like to be checked for sexually transmitted infection. She denies any vaginal bleeding. Denies changes in bowel movements. Denies nausea, vomiting, fever/chills, flank pain or back pain. Denies any history of ovarian cyst in the past.  Became concerned and came to the ED for further evaluation and treatment. Nursing Notes were all reviewed and agreed with or any disagreements were addressed in the HPI. REVIEW OF SYSTEMS :      Review of Systems   Constitutional:  Negative for activity change, appetite change, chills and fever. Respiratory: Negative. Negative for cough, chest tightness and shortness of breath. Cardiovascular: Negative. Negative for chest pain, palpitations and leg swelling. Gastrointestinal:  Positive for abdominal pain. Negative for constipation, diarrhea, nausea and vomiting.

## 2023-08-03 DIAGNOSIS — M51.26 HERNIATION OF INTERVERTEBRAL DISC OF LUMBAR SPINE: ICD-10-CM

## 2023-08-04 RX ORDER — MELOXICAM 15 MG/1
15 TABLET ORAL DAILY PRN
Qty: 30 TABLET | Refills: 1 | Status: SHIPPED | OUTPATIENT
Start: 2023-08-04

## 2023-08-17 RX ORDER — LIDOCAINE 50 MG/G
PATCH TOPICAL
Qty: 30 PATCH | Refills: 1 | Status: SHIPPED | OUTPATIENT
Start: 2023-08-17

## 2023-08-25 DIAGNOSIS — M51.26 HERNIATION OF INTERVERTEBRAL DISC OF LUMBAR SPINE: ICD-10-CM

## 2023-08-25 NOTE — TELEPHONE ENCOUNTER
Prescription Refill     Medication Name:  tiZANidine  Pharmacy: [address] [place] [address] [place] [address]  Patient Contact Number:  [phone]

## 2023-08-29 RX ORDER — TIZANIDINE 4 MG/1
TABLET ORAL
Qty: 40 TABLET | Refills: 0 | OUTPATIENT
Start: 2023-08-29

## 2023-09-21 ENCOUNTER — TELEPHONE (OUTPATIENT)
Dept: ORTHOPEDIC SURGERY | Age: 48
End: 2023-09-21

## 2023-09-21 NOTE — TELEPHONE ENCOUNTER
Prescription Refill     Medication Name:  tiZANidine (Loral Major) 4 MG tablet  Pharmacy: [facility] [id number] [place] [phone] Lindsay Man [phone]  Patient Contact Number:  [phone]    Pt ASKING FOR REFILL ON THE ABOVE MED.

## 2023-10-03 ENCOUNTER — OFFICE VISIT (OUTPATIENT)
Dept: GYNECOLOGY | Age: 48
End: 2023-10-03
Payer: COMMERCIAL

## 2023-10-03 VITALS
SYSTOLIC BLOOD PRESSURE: 122 MMHG | DIASTOLIC BLOOD PRESSURE: 80 MMHG | BODY MASS INDEX: 25.87 KG/M2 | WEIGHT: 146 LBS | HEART RATE: 72 BPM | RESPIRATION RATE: 17 BRPM | HEIGHT: 63 IN

## 2023-10-03 DIAGNOSIS — Z01.419 WELL WOMAN EXAM WITH ROUTINE GYNECOLOGICAL EXAM: Primary | ICD-10-CM

## 2023-10-03 DIAGNOSIS — R35.0 URINATION FREQUENCY: ICD-10-CM

## 2023-10-03 PROCEDURE — G8484 FLU IMMUNIZE NO ADMIN: HCPCS | Performed by: OBSTETRICS & GYNECOLOGY

## 2023-10-03 PROCEDURE — 99396 PREV VISIT EST AGE 40-64: CPT | Performed by: OBSTETRICS & GYNECOLOGY

## 2023-10-04 ENCOUNTER — OFFICE VISIT (OUTPATIENT)
Dept: ORTHOPEDIC SURGERY | Age: 48
End: 2023-10-04

## 2023-10-04 VITALS — HEIGHT: 63 IN | BODY MASS INDEX: 25.87 KG/M2 | WEIGHT: 146 LBS

## 2023-10-04 DIAGNOSIS — M47.816 LUMBAR SPONDYLOSIS: ICD-10-CM

## 2023-10-04 DIAGNOSIS — M54.10 RADICULAR PAIN OF LEFT LOWER EXTREMITY: Primary | ICD-10-CM

## 2023-10-04 DIAGNOSIS — M51.36 DDD (DEGENERATIVE DISC DISEASE), LUMBAR: ICD-10-CM

## 2023-10-04 DIAGNOSIS — M51.26 HERNIATION OF INTERVERTEBRAL DISC OF LUMBAR SPINE: ICD-10-CM

## 2023-10-04 RX ORDER — LIDOCAINE 50 MG/G
1 PATCH TOPICAL DAILY PRN
Qty: 30 PATCH | Refills: 1 | Status: SHIPPED | OUTPATIENT
Start: 2023-10-04

## 2023-10-04 RX ORDER — TRAMADOL HYDROCHLORIDE 50 MG/1
50 TABLET ORAL EVERY 8 HOURS PRN
Qty: 20 TABLET | Refills: 0 | Status: SHIPPED | OUTPATIENT
Start: 2023-10-04 | End: 2023-10-11

## 2023-10-04 RX ORDER — TIZANIDINE 4 MG/1
4 TABLET ORAL EVERY 8 HOURS PRN
Qty: 40 TABLET | Refills: 0 | Status: SHIPPED | OUTPATIENT
Start: 2023-10-04

## 2023-10-04 NOTE — PROGRESS NOTES
disc extrusion type herniation/free fragment   impinging on the descending left L4 nerve root and displacing the exiting left L3 nerve root in    the foramen. Severe bilateral facet arthropathy. Findings may contribute with patient's   left-sided symptoms. Thank you for the opportunity to provide your interpretation. Dandre Rooney MD     A: HUAN/BRIAN 09/08/2022 6:46 AM             Specimen Collected: 09/08/22 06:46 EDT Last Resulted: 09/08/22 14:54 EDT                   Assessment   Impression: . Encounter Diagnoses   Name Primary? 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     Radicular pain of left lower extremity Yes    DDD (degenerative disc disease), lumbar     Lumbar spondylosis         Left superiorly migrating L3/L4 disc herniation/extrusion  L3 and L4 nerve root impingement neuroforaminal and lateral recess respectively      Plan:     Recommend that she start a formal course of PT which she has declined  Recommend she consider L SUYAPA as a treatment option for pain control which she declined  Activity modification lumbar spine precautions  Lumbar stabilization home exercise program handouts provided  Medical pain management: Zanaflex and Lidoderm as needed, Ultram No. 20 no refills for moderate to severe pain minimize use of narcotics which she is doing. We will have to decline the request for narcotics going forward if she is not inclined to proceed with medical treatment options as outlined above. Orders:      No orders of the defined types were placed in this encounter. Zeynep Meléndez MD.      Disclaimer: \"This note was dictated with voice recognition software. Though review and correction are routine, we apologize for any errors. \"

## 2023-10-05 LAB — BACTERIA UR CULT: NORMAL

## 2023-10-08 ASSESSMENT — ENCOUNTER SYMPTOMS
GASTROINTESTINAL NEGATIVE: 1
EYES NEGATIVE: 1
ALLERGIC/IMMUNOLOGIC NEGATIVE: 1
RESPIRATORY NEGATIVE: 1

## 2023-10-09 ENCOUNTER — TELEPHONE (OUTPATIENT)
Dept: GYNECOLOGY | Age: 48
End: 2023-10-09

## 2023-10-09 DIAGNOSIS — N39.0 URINARY TRACT INFECTION WITHOUT HEMATURIA, SITE UNSPECIFIED: Primary | ICD-10-CM

## 2023-10-09 RX ORDER — AMOXICILLIN AND CLAVULANATE POTASSIUM 875; 125 MG/1; MG/1
1 TABLET, FILM COATED ORAL 2 TIMES DAILY
Qty: 14 TABLET | Refills: 0 | Status: SHIPPED | OUTPATIENT
Start: 2023-10-09 | End: 2023-10-16

## 2023-10-09 NOTE — TELEPHONE ENCOUNTER
Called in script into [address] [facility] [id number] Adrienne Lebron, [address] [facility] [phone] - F [phone]      Meds pending, patient is aware of meds been called in

## 2023-10-24 DIAGNOSIS — M51.26 HERNIATION OF INTERVERTEBRAL DISC OF LUMBAR SPINE: ICD-10-CM

## 2023-10-25 RX ORDER — TIZANIDINE 4 MG/1
TABLET ORAL
Qty: 40 TABLET | Refills: 0 | Status: SHIPPED | OUTPATIENT
Start: 2023-10-25

## 2023-11-02 RX ORDER — AMOXICILLIN AND CLAVULANATE POTASSIUM 875; 125 MG/1; MG/1
TABLET, FILM COATED ORAL
Qty: 14 TABLET | Refills: 0 | OUTPATIENT
Start: 2023-11-02

## 2023-11-14 RX ORDER — AMOXICILLIN AND CLAVULANATE POTASSIUM 875; 125 MG/1; MG/1
TABLET, FILM COATED ORAL
Qty: 14 TABLET | Refills: 0 | OUTPATIENT
Start: 2023-11-14

## 2023-11-17 DIAGNOSIS — M51.26 HERNIATION OF INTERVERTEBRAL DISC OF LUMBAR SPINE: ICD-10-CM

## 2023-11-17 NOTE — TELEPHONE ENCOUNTER
Prescription Refill     Medication Name:  TRAMADOL/TIZANIDINE/LIDOCAINE PATCHES/MELOXICAM  Pharmacy: [facility]  Patient Contact Number:  [phone]    PATIENT SAYS SHE IS OUT OF MEDICATIONS.  AND PAIN LEVEL IS A 10

## 2023-11-21 RX ORDER — LIDOCAINE 50 MG/G
PATCH TOPICAL
Qty: 30 PATCH | Refills: 1 | Status: SHIPPED | OUTPATIENT
Start: 2023-11-21

## 2023-11-21 RX ORDER — AMOXICILLIN AND CLAVULANATE POTASSIUM 875; 125 MG/1; MG/1
TABLET, FILM COATED ORAL
Qty: 14 TABLET | Refills: 0 | OUTPATIENT
Start: 2023-11-21

## 2023-11-22 RX ORDER — TIZANIDINE 4 MG/1
TABLET ORAL
Qty: 40 TABLET | Refills: 0 | Status: SHIPPED | OUTPATIENT
Start: 2023-11-22

## 2023-11-22 RX ORDER — MELOXICAM 15 MG/1
15 TABLET ORAL DAILY PRN
Qty: 30 TABLET | Refills: 1 | Status: SHIPPED | OUTPATIENT
Start: 2023-11-22

## 2023-11-22 NOTE — TELEPHONE ENCOUNTER
Tizanidine last filled 10/25/23  Meloxicam last filled 8/4/23 with 1 refill  Tramadol last filled 10/4/23, and clinical note states no refills  Lidocaine patches just refilled yesterday  LOV 10/4/23  F/U sched 11/28/23  Please advise

## 2023-11-22 NOTE — TELEPHONE ENCOUNTER
Patient is calling to ck the statues of her medications refills she just made and follow up for next Tuesday she just need a call back to see if these medications can get refilled. Please Advise.

## 2023-11-29 RX ORDER — AMOXICILLIN AND CLAVULANATE POTASSIUM 875; 125 MG/1; MG/1
TABLET, FILM COATED ORAL
Qty: 14 TABLET | Refills: 0 | OUTPATIENT
Start: 2023-11-29

## 2023-11-29 NOTE — TELEPHONE ENCOUNTER
Future Appointments   Date Time Provider Department Center   10/8/2024 11:30 AM Mishel Cook MD [facility] GYN [facility] 10/3/2023 Joey

## 2023-11-30 DIAGNOSIS — B37.9 YEAST INFECTION: Primary | ICD-10-CM

## 2023-11-30 RX ORDER — FLUCONAZOLE 150 MG/1
150 TABLET ORAL ONCE
Qty: 1 TABLET | Refills: 1 | Status: SHIPPED | OUTPATIENT
Start: 2023-11-30 | End: 2023-11-30

## 2023-11-30 NOTE — TELEPHONE ENCOUNTER
Called in script, also called and left a detail message in letting patient know script has been called in    Meds pending please sign off

## 2023-11-30 NOTE — TELEPHONE ENCOUNTER
Patient believes she has an yeast infection.  Her symptoms is discharge        [facility] [id number] Socorro Chu, [address] [facility] [[id number]]

## 2023-12-08 ENCOUNTER — TELEPHONE (OUTPATIENT)
Dept: GYNECOLOGY | Age: 48
End: 2023-12-08

## 2023-12-08 NOTE — TELEPHONE ENCOUNTER
Patient was prescribed antibiotics for UTI she had and feels her symptoms are still there. Patient would like a stronger dose.     [facility] [id number] Leilani Regalado, [address] [facility] 20 [[id number]]

## 2023-12-08 NOTE — TELEPHONE ENCOUNTER
Called and spoke to patient, informed her that Dr Charlene Nolan would like for her to submit an e- visit through [facility]. Asked patient was she familiar with doing this she said she would figure was trying to explain it to patient, offered to give her [employer] help desk number she said she was busy. Told her if she is not able to submit e- visit then she can call office for [employer] help desk number at [phone]. Explained to patient that Dr Charlene Nolan doesn't call in medications for patients without an e-visit for UTI. Patient says she understands this.

## 2024-01-15 DIAGNOSIS — M54.10 RADICULAR PAIN OF LEFT LOWER EXTREMITY: ICD-10-CM

## 2024-01-15 DIAGNOSIS — M51.26 HERNIATION OF INTERVERTEBRAL DISC OF LUMBAR SPINE: ICD-10-CM

## 2024-01-15 RX ORDER — AMOXICILLIN AND CLAVULANATE POTASSIUM 875; 125 MG/1; MG/1
TABLET, FILM COATED ORAL
Qty: 14 TABLET | Refills: 0 | OUTPATIENT
Start: 2024-01-15

## 2024-01-16 ENCOUNTER — OFFICE VISIT (OUTPATIENT)
Dept: ORTHOPEDIC SURGERY | Age: 49
End: 2024-01-16
Payer: COMMERCIAL

## 2024-01-16 VITALS — WEIGHT: 151.7 LBS | BODY MASS INDEX: 26.87 KG/M2

## 2024-01-16 DIAGNOSIS — M51.26 HERNIATION OF INTERVERTEBRAL DISC OF LUMBAR SPINE: ICD-10-CM

## 2024-01-16 DIAGNOSIS — M51.36 DDD (DEGENERATIVE DISC DISEASE), LUMBAR: ICD-10-CM

## 2024-01-16 DIAGNOSIS — M54.10 RADICULAR PAIN OF LEFT LOWER EXTREMITY: ICD-10-CM

## 2024-01-16 DIAGNOSIS — M47.816 LUMBAR SPONDYLOSIS: ICD-10-CM

## 2024-01-16 DIAGNOSIS — M54.50 LOW BACK PAIN, UNSPECIFIED BACK PAIN LATERALITY, UNSPECIFIED CHRONICITY, UNSPECIFIED WHETHER SCIATICA PRESENT: Primary | ICD-10-CM

## 2024-01-16 PROCEDURE — 1036F TOBACCO NON-USER: CPT | Performed by: INTERNAL MEDICINE

## 2024-01-16 PROCEDURE — G8484 FLU IMMUNIZE NO ADMIN: HCPCS | Performed by: INTERNAL MEDICINE

## 2024-01-16 PROCEDURE — G8419 CALC BMI OUT NRM PARAM NOF/U: HCPCS | Performed by: INTERNAL MEDICINE

## 2024-01-16 PROCEDURE — 99214 OFFICE O/P EST MOD 30 MIN: CPT | Performed by: INTERNAL MEDICINE

## 2024-01-16 PROCEDURE — G8428 CUR MEDS NOT DOCUMENT: HCPCS | Performed by: INTERNAL MEDICINE

## 2024-01-16 RX ORDER — LIDOCAINE 50 MG/G
PATCH TOPICAL
Qty: 30 PATCH | Refills: 1 | Status: SHIPPED | OUTPATIENT
Start: 2024-01-16

## 2024-01-16 RX ORDER — TIZANIDINE 4 MG/1
TABLET ORAL
Qty: 40 TABLET | Refills: 0 | Status: SHIPPED | OUTPATIENT
Start: 2024-01-16

## 2024-01-16 RX ORDER — MELOXICAM 15 MG/1
15 TABLET ORAL DAILY PRN
Qty: 30 TABLET | Refills: 1 | Status: SHIPPED | OUTPATIENT
Start: 2024-01-16

## 2024-01-16 RX ORDER — TRAMADOL HYDROCHLORIDE 50 MG/1
50 TABLET ORAL EVERY 8 HOURS PRN
Qty: 20 TABLET | Refills: 0 | Status: SHIPPED | OUTPATIENT
Start: 2024-01-16 | End: 2024-01-23

## 2024-01-16 NOTE — PROGRESS NOTES
refer to medical pain management services.         Orders:        Orders Placed This Encounter   Procedures    XR LUMBAR SPINE (2-3 VIEWS)     Order Specific Question:   Reason for exam:     Answer:   Lower back pain         Alvin Mehta MD.      Disclaimer:    \"This note was dictated with voice recognition software. Though review and correction are routine, we apologize for any errors.\"

## 2024-01-22 ENCOUNTER — HOSPITAL ENCOUNTER (EMERGENCY)
Age: 49
Discharge: LEFT BEFORE DISCHARGE | End: 2024-01-23
Attending: EMERGENCY MEDICINE
Payer: COMMERCIAL

## 2024-01-22 ENCOUNTER — APPOINTMENT (OUTPATIENT)
Dept: CT IMAGING | Age: 49
End: 2024-01-22
Payer: COMMERCIAL

## 2024-01-22 VITALS
DIASTOLIC BLOOD PRESSURE: 82 MMHG | BODY MASS INDEX: 26.58 KG/M2 | RESPIRATION RATE: 18 BRPM | HEART RATE: 82 BPM | OXYGEN SATURATION: 100 % | SYSTOLIC BLOOD PRESSURE: 125 MMHG | TEMPERATURE: 97.8 F | WEIGHT: 150 LBS | HEIGHT: 63 IN

## 2024-01-22 DIAGNOSIS — R42 DIZZINESS: Primary | ICD-10-CM

## 2024-01-22 LAB
ALBUMIN SERPL-MCNC: 4.2 G/DL (ref 3.4–5)
ALBUMIN/GLOB SERPL: 1.4 {RATIO} (ref 1.1–2.2)
ALP SERPL-CCNC: 122 U/L (ref 40–129)
ALT SERPL-CCNC: 29 U/L (ref 10–40)
ANION GAP SERPL CALCULATED.3IONS-SCNC: 13 MMOL/L (ref 3–16)
AST SERPL-CCNC: 29 U/L (ref 15–37)
BACTERIA URNS QL MICRO: NORMAL /HPF
BASOPHILS # BLD: 0 K/UL (ref 0–0.2)
BASOPHILS NFR BLD: 0.3 %
BILIRUB SERPL-MCNC: <0.2 MG/DL (ref 0–1)
BILIRUB UR QL STRIP.AUTO: NEGATIVE
BUN SERPL-MCNC: 28 MG/DL (ref 7–20)
CALCIUM SERPL-MCNC: 9.3 MG/DL (ref 8.3–10.6)
CHLORIDE SERPL-SCNC: 102 MMOL/L (ref 99–110)
CLARITY UR: ABNORMAL
CO2 SERPL-SCNC: 26 MMOL/L (ref 21–32)
COLOR UR: ABNORMAL
CREAT SERPL-MCNC: 0.7 MG/DL (ref 0.6–1.1)
DEPRECATED RDW RBC AUTO: 13.6 % (ref 12.4–15.4)
EOSINOPHIL # BLD: 0.1 K/UL (ref 0–0.6)
EOSINOPHIL NFR BLD: 1.3 %
EPI CELLS #/AREA URNS AUTO: 2 /HPF (ref 0–5)
FLUAV RNA RESP QL NAA+PROBE: NOT DETECTED
FLUBV RNA RESP QL NAA+PROBE: NOT DETECTED
GFR SERPLBLD CREATININE-BSD FMLA CKD-EPI: >60 ML/MIN/{1.73_M2}
GLUCOSE BLD-MCNC: 98 MG/DL (ref 70–99)
GLUCOSE SERPL-MCNC: 121 MG/DL (ref 70–99)
GLUCOSE UR STRIP.AUTO-MCNC: NEGATIVE MG/DL
HCG SERPL QL: NEGATIVE
HCT VFR BLD AUTO: 40.3 % (ref 36–48)
HGB BLD-MCNC: 13.4 G/DL (ref 12–16)
HGB UR QL STRIP.AUTO: NEGATIVE
HYALINE CASTS #/AREA URNS AUTO: 0 /LPF (ref 0–8)
KETONES UR STRIP.AUTO-MCNC: NEGATIVE MG/DL
LEUKOCYTE ESTERASE UR QL STRIP.AUTO: ABNORMAL
LYMPHOCYTES # BLD: 0.9 K/UL (ref 1–5.1)
LYMPHOCYTES NFR BLD: 13.6 %
MAGNESIUM SERPL-MCNC: 2.1 MG/DL (ref 1.8–2.4)
MCH RBC QN AUTO: 28.7 PG (ref 26–34)
MCHC RBC AUTO-ENTMCNC: 33.3 G/DL (ref 31–36)
MCV RBC AUTO: 86.2 FL (ref 80–100)
MONOCYTES # BLD: 0.3 K/UL (ref 0–1.3)
MONOCYTES NFR BLD: 4.8 %
NEUTROPHILS # BLD: 5.6 K/UL (ref 1.7–7.7)
NEUTROPHILS NFR BLD: 80 %
NITRITE UR QL STRIP.AUTO: NEGATIVE
PERFORMED ON: NORMAL
PH UR STRIP.AUTO: 6.5 [PH] (ref 5–8)
PLATELET # BLD AUTO: 172 K/UL (ref 135–450)
PMV BLD AUTO: 9.5 FL (ref 5–10.5)
POTASSIUM SERPL-SCNC: 3.5 MMOL/L (ref 3.5–5.1)
PROT SERPL-MCNC: 7.3 G/DL (ref 6.4–8.2)
PROT UR STRIP.AUTO-MCNC: NEGATIVE MG/DL
RBC # BLD AUTO: 4.67 M/UL (ref 4–5.2)
RBC CLUMPS #/AREA URNS AUTO: 1 /HPF (ref 0–4)
SARS-COV-2 RNA RESP QL NAA+PROBE: NOT DETECTED
SODIUM SERPL-SCNC: 141 MMOL/L (ref 136–145)
SP GR UR STRIP.AUTO: 1.02 (ref 1–1.03)
UA COMPLETE W REFLEX CULTURE PNL UR: ABNORMAL
UA DIPSTICK W REFLEX MICRO PNL UR: YES
URN SPEC COLLECT METH UR: ABNORMAL
UROBILINOGEN UR STRIP-ACNC: 1 E.U./DL
WBC # BLD AUTO: 7 K/UL (ref 4–11)
WBC #/AREA URNS AUTO: 1 /HPF (ref 0–5)

## 2024-01-22 PROCEDURE — 80053 COMPREHEN METABOLIC PANEL: CPT

## 2024-01-22 PROCEDURE — 87636 SARSCOV2 & INF A&B AMP PRB: CPT

## 2024-01-22 PROCEDURE — 36415 COLL VENOUS BLD VENIPUNCTURE: CPT

## 2024-01-22 PROCEDURE — 99284 EMERGENCY DEPT VISIT MOD MDM: CPT

## 2024-01-22 PROCEDURE — 93005 ELECTROCARDIOGRAM TRACING: CPT | Performed by: EMERGENCY MEDICINE

## 2024-01-22 PROCEDURE — 85025 COMPLETE CBC W/AUTO DIFF WBC: CPT

## 2024-01-22 PROCEDURE — 81001 URINALYSIS AUTO W/SCOPE: CPT

## 2024-01-22 PROCEDURE — 70450 CT HEAD/BRAIN W/O DYE: CPT

## 2024-01-22 PROCEDURE — 6370000000 HC RX 637 (ALT 250 FOR IP): Performed by: PHYSICIAN ASSISTANT

## 2024-01-22 PROCEDURE — 83735 ASSAY OF MAGNESIUM: CPT

## 2024-01-22 PROCEDURE — 84703 CHORIONIC GONADOTROPIN ASSAY: CPT

## 2024-01-22 RX ORDER — MECLIZINE HCL 12.5 MG/1
25 TABLET ORAL ONCE
Status: COMPLETED | OUTPATIENT
Start: 2024-01-22 | End: 2024-01-22

## 2024-01-22 RX ORDER — ONDANSETRON 4 MG/1
4 TABLET, ORALLY DISINTEGRATING ORAL ONCE
Status: COMPLETED | OUTPATIENT
Start: 2024-01-22 | End: 2024-01-22

## 2024-01-22 RX ADMIN — ONDANSETRON 4 MG: 4 TABLET, ORALLY DISINTEGRATING ORAL at 22:36

## 2024-01-22 RX ADMIN — MECLIZINE 25 MG: 12.5 TABLET ORAL at 22:36

## 2024-01-22 ASSESSMENT — ENCOUNTER SYMPTOMS
VOMITING: 0
SHORTNESS OF BREATH: 0
COUGH: 0
BACK PAIN: 0
EYE PAIN: 0
NAUSEA: 0
SORE THROAT: 0
RHINORRHEA: 0
DIARRHEA: 0
CONSTIPATION: 0

## 2024-01-22 ASSESSMENT — PAIN - FUNCTIONAL ASSESSMENT: PAIN_FUNCTIONAL_ASSESSMENT: NONE - DENIES PAIN

## 2024-01-23 ENCOUNTER — APPOINTMENT (OUTPATIENT)
Dept: CT IMAGING | Age: 49
End: 2024-01-23
Payer: COMMERCIAL

## 2024-01-23 LAB
EKG ATRIAL RATE: 60 BPM
EKG DIAGNOSIS: NORMAL
EKG P AXIS: 69 DEGREES
EKG P-R INTERVAL: 200 MS
EKG Q-T INTERVAL: 440 MS
EKG QRS DURATION: 80 MS
EKG QTC CALCULATION (BAZETT): 440 MS
EKG R AXIS: -23 DEGREES
EKG T AXIS: 33 DEGREES
EKG VENTRICULAR RATE: 60 BPM

## 2024-01-23 PROCEDURE — 93010 ELECTROCARDIOGRAM REPORT: CPT | Performed by: INTERNAL MEDICINE

## 2024-01-23 NOTE — ED PROVIDER NOTES
I initially signed up to see this patient and I did discuss the case with the ERIN however patient left AMA prior to my evaluation.  I did not perform medical screening exam on this patient.     Jose Herrera MD  01/23/24 0036    
discussed)  None  Discussion with Other Profesionals : None    Social Determinants : None    Records Reviewed : None    CC/HPI Summary, DDx, ED Course, and Reassessment: 48-year-old female presents emergency room due to dizziness starting around 730 this evening when she is lying in bed looking at her phone.  She states that she looked up from her phone when she developed the dizziness lasting about 5 minutes at.  She states that since arrival has improved.  She also felt some pressure behind her eyes.    She has a reassuring neuroexam without any focal neurological deficits today.  Lungs are clear to auscultation heart has regular rate and rhythm.  She is able to ambulate here in the emergency room without any significant difficulty.  Bilateral ear exam did not show any acute abnormalities.  Pupils are equal round and reactive to light.  Negative nystagmus, negative test of skew.  NIH is 0.    CBC and CMP did not show any acute abnormalities.  COVID-19 and influenza test were negative.  hCG testing was negative.  Urinalysis did not show acute infection.  EKG read as normal sinus rhythm.  CT scan of the head without contrast did not show any acute intracranial abnormalities.  We did order a CTA of the head and neck with contrast however patient refused this test and wanted to sign out AGAINST MEDICAL ADVICE.    I did discuss the risk and benefits of testing with the CTA of the head and neck and that we cannot further evaluate her dizziness without this test.  Patient was fully informed of the risk and benefits of declining this testing today.  She is capable of making her own decisions and does not show any signs of altered mental status.  She has capacity to make her own decisions and is aware of the risk of not obtaining this test today.  She is understanding  of this risk and prefers to be discharged at this time.      Patient / family declined CTA head and neck today.       I am the Primary Clinician of

## 2024-02-15 DIAGNOSIS — M51.26 HERNIATION OF INTERVERTEBRAL DISC OF LUMBAR SPINE: ICD-10-CM

## 2024-02-15 DIAGNOSIS — M54.10 RADICULAR PAIN OF LEFT LOWER EXTREMITY: ICD-10-CM

## 2024-02-16 RX ORDER — MELOXICAM 15 MG/1
15 TABLET ORAL DAILY PRN
Qty: 30 TABLET | Refills: 1 | OUTPATIENT
Start: 2024-02-16

## 2024-02-16 RX ORDER — TRAMADOL HYDROCHLORIDE 50 MG/1
TABLET ORAL
Qty: 20 TABLET | OUTPATIENT
Start: 2024-02-16

## 2024-02-16 RX ORDER — TIZANIDINE 4 MG/1
TABLET ORAL
Qty: 40 TABLET | Refills: 0 | OUTPATIENT
Start: 2024-02-16

## 2024-02-19 DIAGNOSIS — M51.26 HERNIATION OF INTERVERTEBRAL DISC OF LUMBAR SPINE: ICD-10-CM

## 2024-02-19 NOTE — TELEPHONE ENCOUNTER
Prescription Refill     Medication Name:  MELOXICAM, TIZANIDINE  Pharmacy: [facility]  Patient Contact Number:  [phone]    0 PILLS LEFT   PAIN LEVEL 9

## 2024-02-20 RX ORDER — MELOXICAM 15 MG/1
15 TABLET ORAL DAILY PRN
Qty: 30 TABLET | Refills: 0 | Status: SHIPPED | OUTPATIENT
Start: 2024-02-20

## 2024-02-20 RX ORDER — TIZANIDINE 4 MG/1
TABLET ORAL
Qty: 40 TABLET | Refills: 0 | Status: SHIPPED | OUTPATIENT
Start: 2024-02-20

## 2024-02-20 NOTE — TELEPHONE ENCOUNTER
RX Request:    Meloxicam:    Filled: 01/16/2024  Available: 02/15/2024  Last Seen: 01/16/2024  Follow Up: None      Tizanidine:    Filled: 01/16/2024 (10 days)  Available: 01/26/2024  Last Seen: 01/16/2024  Follow Up: None

## 2024-02-27 RX ORDER — FLUCONAZOLE 150 MG/1
150 TABLET ORAL
Qty: 3 TABLET | Refills: 3 | Status: SHIPPED | OUTPATIENT
Start: 2024-02-27

## 2024-03-08 RX ORDER — FLUCONAZOLE 150 MG/1
TABLET ORAL
Qty: 1 TABLET | Refills: 1 | Status: SHIPPED | OUTPATIENT
Start: 2024-03-08

## 2024-03-11 ENCOUNTER — OFFICE VISIT (OUTPATIENT)
Dept: ORTHOPEDIC SURGERY | Age: 49
End: 2024-03-11

## 2024-03-11 VITALS — HEIGHT: 63 IN | WEIGHT: 150 LBS | BODY MASS INDEX: 26.58 KG/M2

## 2024-03-11 DIAGNOSIS — M79.671 RIGHT FOOT PAIN: ICD-10-CM

## 2024-03-11 DIAGNOSIS — M51.26 HERNIATION OF INTERVERTEBRAL DISC OF LUMBAR SPINE: ICD-10-CM

## 2024-03-11 DIAGNOSIS — M20.41 HAMMERTOE OF RIGHT FOOT: ICD-10-CM

## 2024-03-11 DIAGNOSIS — M77.41 METATARSALGIA OF RIGHT FOOT: Primary | ICD-10-CM

## 2024-03-11 PROCEDURE — MISCD124 DARCO POST-OP SHOE BRACE (RETAIL): Performed by: INTERNAL MEDICINE

## 2024-03-11 RX ORDER — TIZANIDINE 4 MG/1
TABLET ORAL
Qty: 40 TABLET | Refills: 1 | Status: SHIPPED | OUTPATIENT
Start: 2024-03-11

## 2024-03-11 RX ORDER — MELOXICAM 15 MG/1
15 TABLET ORAL DAILY PRN
Qty: 30 TABLET | Refills: 1 | Status: SHIPPED | OUTPATIENT
Start: 2024-03-11

## 2024-03-11 RX ORDER — TRAMADOL HYDROCHLORIDE 50 MG/1
50 TABLET ORAL EVERY 8 HOURS PRN
Qty: 10 TABLET | Refills: 0 | Status: SHIPPED | OUTPATIENT
Start: 2024-03-11 | End: 2024-03-14

## 2024-03-11 NOTE — PROGRESS NOTES
exam:     Answer:   Injury of toe    Lynco 405 W/ MET Pad Brace     Patient was prescribed Lyncos 405 inserts.  The bilateral feet will require stabilization / support from this semi-rigid / rigid orthosis to improve their function.  The orthosis will assist in protecting the affected area, provide functional support and facilitate healing.    The patient was educated and fit by a healthcare professional with expert knowledge and specialization in brace application while under the direct supervision of the treating physician.  Verbal and written instructions for the use of and application of this item were provided.   They were instructed to contact the office immediately should the brace result in increased pain, decreased sensation, increased swelling or worsening of the condition.           Disclaimer:    \"This note was dictated with voice recognition software. Though review and correction are routine, we apologize for any errors.\"

## 2024-03-18 ENCOUNTER — E-VISIT (OUTPATIENT)
Dept: GYNECOLOGY | Age: 49
End: 2024-03-18
Payer: COMMERCIAL

## 2024-03-18 DIAGNOSIS — R82.90 ABNORMAL URINE ODOR: Primary | ICD-10-CM

## 2024-03-18 PROCEDURE — 99421 OL DIG E/M SVC 5-10 MIN: CPT | Performed by: OBSTETRICS & GYNECOLOGY

## 2024-03-18 RX ORDER — FLUCONAZOLE 150 MG/1
150 TABLET ORAL ONCE
Qty: 1 TABLET | Refills: 1 | Status: SHIPPED | OUTPATIENT
Start: 2024-03-18 | End: 2024-03-18

## 2024-03-18 RX ORDER — AMOXICILLIN AND CLAVULANATE POTASSIUM 875; 125 MG/1; MG/1
1 TABLET, FILM COATED ORAL 2 TIMES DAILY
Qty: 14 TABLET | Refills: 0 | Status: SHIPPED | OUTPATIENT
Start: 2024-03-18 | End: 2024-03-25

## 2024-03-18 NOTE — PROGRESS NOTES
Patient is concerned about urinary frequency and foul smelling urine.   Patient concerned about UTI.     Review of Systems: as above  General ROS: negative  Psychological ROS: negative  Ophthalmic ROS: negative  ENT ROS: negative  Allergy and Immunology ROS: negative  Hematological and Lymphatic ROS: negative  Endocrine ROS: negative  Breast ROS: negative  Respiratory ROS: negative  Cardiovascular ROS: negative  Gastrointestinal ROS: negative  Genito-Urinary ROS:as above  Musculoskeletal ROS: negative  Neurological ROS: negative  Dermatological ROS: negative     Date of Birth 1975  Past Medical History:   Diagnosis Date    Bacterial vaginosis     Bartholin's cyst     Bell's palsy     Fibroid     hx UAE    Necrotizing pancreatitis      Past Surgical History:   Procedure Laterality Date    BARTHOLIN GLAND CYST EXCISION       SECTION      DILATION AND CURETTAGE OF UTERUS      UTERINE FIBROID EMBOLIZATION       OB History    Para Term  AB Living   3 3 3     3   SAB IAB Ectopic Molar Multiple Live Births                    # Outcome Date GA Lbr Alexandr/2nd Weight Sex Delivery Anes PTL Lv   3 Term     M CS-LTranv      2 Term     M CS-LTranv      1 Term     M CS-LTranv        Social History     Socioeconomic History    Marital status: Single     Spouse name: Not on file    Number of children: Not on file    Years of education: Not on file    Highest education level: Not on file   Occupational History    Not on file   Tobacco Use    Smoking status: Former     Current packs/day: 0.00     Types: Cigarettes     Quit date: 2007     Years since quittin.7    Smokeless tobacco: Never   Vaping Use    Vaping Use: Never used   Substance and Sexual Activity    Alcohol use: No    Drug use: No    Sexual activity: Not Currently     Partners: Male   Other Topics Concern    Not on file   Social History Narrative    Not on file     Social Determinants of Health     Financial Resource Strain: Not on file

## 2024-04-05 ENCOUNTER — TELEPHONE (OUTPATIENT)
Dept: GYNECOLOGY | Age: 49
End: 2024-04-05

## 2024-04-05 NOTE — TELEPHONE ENCOUNTER
Patient called into office stating she needs more medication for her UTI. Patient submitted a e- visit on 3/18/24 she stated she can't submit a e- visit she says she was charged. Advised patient that Dr Cook no longer calls in medication for UTI. That a e- visit must be submitted. Per chart patient has not followed Dr. Cook instructions that was advised to her for her to go a local [employer] lab and leave a sample of urine. Patient stated she didn't know she needed to do this. She also stated she did not get her Diflucan. She says she will submit a sample of urine.  Patient then disconnected the call.     Per chart patient has seen a OB/ GYN  on 3/6/24 Apryl Whitt NP with [facility]: Paz Romo is a 49 y.o. female who presents for evaluation of an abnormal vaginal odor and irritation x 2 wks; denies new chemical irritants, using feminine wipes or washes; also c/o urinary frequency; She denies local irritation, urinary symptoms of dysuria, and vulvar itching Sexually transmitted infection risk: not SA . Pt requesting metrogel again for her \"vulvar issues\"; reports needs metrogel refills for her vulvar irritation; discussed waiting for results for medication orders.

## 2024-04-19 ENCOUNTER — E-VISIT (OUTPATIENT)
Dept: GYNECOLOGY | Age: 49
End: 2024-04-19

## 2024-04-19 DIAGNOSIS — R35.0 URINARY FREQUENCY: Primary | ICD-10-CM

## 2024-04-19 RX ORDER — CIPROFLOXACIN 500 MG/1
500 TABLET, FILM COATED ORAL 2 TIMES DAILY
Qty: 14 TABLET | Refills: 0 | Status: SHIPPED | OUTPATIENT
Start: 2024-04-19 | End: 2024-04-26

## 2024-04-20 NOTE — PROGRESS NOTES
Patient with concerns about urinary tract infection.     Review of Systems: as above  General ROS: negative  Psychological ROS: negative  Ophthalmic ROS: negative  ENT ROS: negative  Allergy and Immunology ROS: negative  Hematological and Lymphatic ROS: negative  Endocrine ROS: negative  Breast ROS: negative  Respiratory ROS: negative  Cardiovascular ROS: negative  Gastrointestinal ROS: negative  Genito-Urinary ROS: as above  Musculoskeletal ROS: negative  Neurological ROS: negative  Dermatological ROS: negative     Date of Birth 1975  Past Medical History:   Diagnosis Date    Bacterial vaginosis     Bartholin's cyst     Bell's palsy     Fibroid     hx [place]    Necrotizing pancreatitis      Past Surgical History:   Procedure Laterality Date    BARTHOLIN GLAND CYST EXCISION       SECTION      DILATION AND CURETTAGE OF UTERUS      UTERINE FIBROID EMBOLIZATION       OB History    Para Term  AB Living   3 3 3     3   SAB IAB Ectopic Molar Multiple Live Births                    # Outcome Date GA Lbr Alexandr/2nd Weight Sex Delivery Anes PTL Lv   3 Term     M CS-LTranv      2 Term     M CS-LTranv      1 Term     M CS-LTranv        Social History     Socioeconomic History    Marital status: Single     Spouse name: Not on file    Number of children: Not on file    Years of education: Not on file    Highest education level: Not on file   Occupational History    Not on file   Tobacco Use    Smoking status: Former     Current packs/day: 0.00     Types: Cigarettes     Quit date: 2007     Years since quittin.8    Smokeless tobacco: Never   Vaping Use    Vaping Use: Never used   Substance and Sexual Activity    Alcohol use: No    Drug use: No    Sexual activity: Not Currently     Partners: Male   Other Topics Concern    Not on file   Social History Narrative    Not on file     Social Determinants of Health     Financial Resource Strain: Not on file   Food Insecurity: Not on file   Transportation

## 2024-05-01 DIAGNOSIS — M51.26 HERNIATION OF INTERVERTEBRAL DISC OF LUMBAR SPINE: ICD-10-CM

## 2024-05-02 RX ORDER — TIZANIDINE 4 MG/1
TABLET ORAL
Qty: 40 TABLET | Refills: 0 | Status: SHIPPED | OUTPATIENT
Start: 2024-05-02

## 2024-05-02 RX ORDER — TRAMADOL HYDROCHLORIDE 50 MG/1
TABLET ORAL
Qty: 10 TABLET | OUTPATIENT
Start: 2024-05-02

## 2024-05-02 NOTE — TELEPHONE ENCOUNTER
Rx Refill: Tizanidine    Last Filled: 03/11/2024 with 1 refill  Refill Due: 05/10/2024  Last Seen: 03/11/2024  Follow Up: None          Rx Refill: Tramadol    Last Filled: 03/11/2024 3 day supply  Refill Due: 003/14/2024  Pills Left: 0  Pain Level: 5  Last Seen: 03/11/2024  Follow Up: None    We sent a referral to pain management in January. She has not been scheduled yet. I called pain management to see about getting her scheduled. They are going to contact the patient.

## 2024-06-26 ENCOUNTER — TELEPHONE (OUTPATIENT)
Dept: ORTHOPEDIC SURGERY | Age: 49
End: 2024-06-26

## 2024-06-26 DIAGNOSIS — M51.26 HERNIATION OF INTERVERTEBRAL DISC OF LUMBAR SPINE: ICD-10-CM

## 2024-06-26 RX ORDER — TIZANIDINE 4 MG/1
TABLET ORAL
Qty: 40 TABLET | Refills: 0 | OUTPATIENT
Start: 2024-06-26

## 2024-06-26 RX ORDER — MELOXICAM 15 MG/1
15 TABLET ORAL DAILY PRN
Qty: 30 TABLET | Refills: 1 | OUTPATIENT
Start: 2024-06-26

## 2024-06-26 NOTE — TELEPHONE ENCOUNTER
Rx Request: Tizanidine    Last Filled: 05/02/2024 for 13 days  Refill Due: 05/14/2024  Last Seen: 01/16/2024 for her back  Follow Up: None        Rx Request: Meloxicam    Last Filled: 03/11/2024 with 1 refill  Refill Due: 05/10/2024  Last Seen: 01/16/2024 for her back  Follow Up: None    This is for her lumber pain. Patient was a no show for her pain management appointment. No PT has been done.

## 2024-06-26 NOTE — TELEPHONE ENCOUNTER
Prescription Refill     Medication Name:  TIZANIDINE AND MELOXICAM  Pharmacy: [facility]   Patient Contact Number:  [phone]

## 2024-06-29 DIAGNOSIS — M51.26 HERNIATION OF INTERVERTEBRAL DISC OF LUMBAR SPINE: ICD-10-CM

## 2024-07-02 RX ORDER — TIZANIDINE 4 MG/1
TABLET ORAL
Qty: 40 TABLET | Refills: 0 | OUTPATIENT
Start: 2024-07-02

## 2024-07-08 DIAGNOSIS — M51.26 HERNIATION OF INTERVERTEBRAL DISC OF LUMBAR SPINE: ICD-10-CM

## 2024-07-10 ENCOUNTER — TELEPHONE (OUTPATIENT)
Dept: ORTHOPEDIC SURGERY | Age: 49
End: 2024-07-10

## 2024-07-16 ENCOUNTER — E-VISIT (OUTPATIENT)
Dept: GYNECOLOGY | Age: 49
End: 2024-07-16

## 2024-07-16 ENCOUNTER — TELEPHONE (OUTPATIENT)
Dept: GYNECOLOGY | Age: 49
End: 2024-07-16

## 2024-07-16 DIAGNOSIS — N89.8 VAGINAL DISCHARGE: Primary | ICD-10-CM

## 2024-07-16 NOTE — TELEPHONE ENCOUNTER
Called and spoke to patient, informed her that Dr Cook would like for her to submit an e- visit through [employer]. Patient has been advised that Dr Cook  has 24- 48 hours to replay back to her e- visit

## 2024-07-16 NOTE — TELEPHONE ENCOUNTER
Patient requesting appt. She belives she has UTI or a bacterial infection. Patient does have an active my chart.     Cramping in her uterus, urine frequency, odor.       Patient can be reached at [phone]

## 2024-07-17 RX ORDER — FLUCONAZOLE 150 MG/1
150 TABLET ORAL ONCE
Qty: 1 TABLET | Refills: 1 | Status: SHIPPED | OUTPATIENT
Start: 2024-07-17 | End: 2024-07-17

## 2024-07-17 RX ORDER — AMOXICILLIN AND CLAVULANATE POTASSIUM 875; 125 MG/1; MG/1
1 TABLET, FILM COATED ORAL 2 TIMES DAILY
Qty: 14 TABLET | Refills: 0 | Status: SHIPPED | OUTPATIENT
Start: 2024-07-17 | End: 2024-07-24

## 2024-07-18 NOTE — PROGRESS NOTES
Patient started an e-visit for foul-smelling discharge. Patient with some vaginal itching. Patient with some pelvic cramping.     Review of Systems: as above  General ROS: negative  Psychological ROS: negative  Ophthalmic ROS: negative  ENT ROS: negative  Allergy and Immunology ROS: negative  Hematological and Lymphatic ROS: negative  Endocrine ROS: negative  Breast ROS: negative  Respiratory ROS: negative  Cardiovascular ROS: negative  Gastrointestinal ROS: negative  Genito-Urinary ROS: as above  Musculoskeletal ROS: negative  Neurological ROS: negative  Dermatological ROS: negative     Date of Birth 1975  Past Medical History:   Diagnosis Date    Bacterial vaginosis     Bartholin's cyst     Bell's palsy     Fibroid     hx [place]    Necrotizing pancreatitis      Past Surgical History:   Procedure Laterality Date    BARTHOLIN GLAND CYST EXCISION       SECTION      DILATION AND CURETTAGE OF UTERUS      UTERINE FIBROID EMBOLIZATION       OB History    Para Term  AB Living   3 3 3     3   SAB IAB Ectopic Molar Multiple Live Births                    # Outcome Date GA Lbr Alexandr/2nd Weight Sex Delivery Anes PTL Lv   3 Term     M CS-LTranv      2 Term     M CS-LTranv      1 Term     M CS-LTranv        Social History     Socioeconomic History    Marital status: Single     Spouse name: Not on file    Number of children: Not on file    Years of education: Not on file    Highest education level: Not on file   Occupational History    Not on file   Tobacco Use    Smoking status: Former     Current packs/day: 0.00     Types: Cigarettes     Quit date: 2007     Years since quittin.0    Smokeless tobacco: Never   Vaping Use    Vaping Use: Never used   Substance and Sexual Activity    Alcohol use: No    Drug use: No    Sexual activity: Not Currently     Partners: Male   Other Topics Concern    Not on file   Social History Narrative    Not on file     Social Determinants of Health     Financial

## 2024-07-25 ENCOUNTER — HOSPITAL ENCOUNTER (OUTPATIENT)
Age: 49
Discharge: HOME | End: 2024-07-25
Payer: COMMERCIAL

## 2024-07-25 ENCOUNTER — TELEPHONE (OUTPATIENT)
Dept: GYNECOLOGY | Age: 49
End: 2024-07-25

## 2024-07-25 PROCEDURE — 87086 URINE CULTURE/COLONY COUNT: CPT

## 2024-07-25 NOTE — TELEPHONE ENCOUNTER
Called and spoke to patient related message to her from Dr. Cook. Told patient she can go to a [employer] lab to have her urine done

## 2024-07-25 NOTE — TELEPHONE ENCOUNTER
Tell patient that I did not fill Ciprofloxacin, I filled Augmentin. Also she never did her urine culture. She needs to do this before I can call anything else in.

## 2024-07-25 NOTE — TELEPHONE ENCOUNTER
Patient did a e- visit for this issues on 7/16/24 she was advised to call our office if she is not better

## 2024-07-25 NOTE — TELEPHONE ENCOUNTER
Patient is requesting another refill on ciprofloxacin pt says her symptoms are slightly still there      [id number] - [place] [address] - P [phone] - F [phone] [[id number]]

## 2024-07-26 ENCOUNTER — TELEPHONE (OUTPATIENT)
Dept: ORTHOPEDIC SURGERY | Age: 49
End: 2024-07-26

## 2024-07-26 LAB — BACTERIA UR CULT: NORMAL

## 2024-08-12 DIAGNOSIS — M51.26 HERNIATION OF INTERVERTEBRAL DISC OF LUMBAR SPINE: ICD-10-CM

## 2024-08-13 ENCOUNTER — OFFICE VISIT (OUTPATIENT)
Dept: ORTHOPEDIC SURGERY | Age: 49
End: 2024-08-13

## 2024-08-13 VITALS — BODY MASS INDEX: 26.56 KG/M2 | WEIGHT: 149.91 LBS | HEIGHT: 63 IN

## 2024-08-13 DIAGNOSIS — M25.511 RIGHT SHOULDER PAIN, UNSPECIFIED CHRONICITY: ICD-10-CM

## 2024-08-13 DIAGNOSIS — S46.011A STRAIN OF RIGHT ROTATOR CUFF CAPSULE, INITIAL ENCOUNTER: ICD-10-CM

## 2024-08-13 DIAGNOSIS — M25.512 LEFT SHOULDER PAIN, UNSPECIFIED CHRONICITY: Primary | ICD-10-CM

## 2024-08-13 DIAGNOSIS — M75.31 CALCIFIC TENDINITIS OF RIGHT SHOULDER: ICD-10-CM

## 2024-08-13 DIAGNOSIS — M51.26 HERNIATION OF INTERVERTEBRAL DISC OF LUMBAR SPINE: ICD-10-CM

## 2024-08-13 DIAGNOSIS — S46.012A STRAIN OF LEFT ROTATOR CUFF CAPSULE, INITIAL ENCOUNTER: ICD-10-CM

## 2024-08-13 RX ORDER — LIDOCAINE 50 MG/G
PATCH TOPICAL
Qty: 30 PATCH | Refills: 1 | Status: SHIPPED | OUTPATIENT
Start: 2024-08-13

## 2024-08-13 RX ORDER — MELOXICAM 15 MG/1
15 TABLET ORAL DAILY
Qty: 30 TABLET | Refills: 1 | Status: SHIPPED | OUTPATIENT
Start: 2024-08-13

## 2024-08-13 RX ORDER — TIZANIDINE 4 MG/1
TABLET ORAL
Qty: 40 TABLET | Refills: 0 | OUTPATIENT
Start: 2024-08-13

## 2024-08-13 RX ORDER — TIZANIDINE 4 MG/1
4 TABLET ORAL 3 TIMES DAILY PRN
Qty: 40 TABLET | Refills: 1 | Status: SHIPPED | OUTPATIENT
Start: 2024-08-13

## 2024-08-13 RX ORDER — TRAMADOL HYDROCHLORIDE 50 MG/1
50 TABLET ORAL EVERY 8 HOURS PRN
Qty: 15 TABLET | Refills: 0 | Status: SHIPPED | OUTPATIENT
Start: 2024-08-13 | End: 2024-08-18

## 2024-08-13 NOTE — PROGRESS NOTES
Chief Complaint:   Chief Complaint   Patient presents with    Shoulder Pain     bilat, R=L, NKI, pt states that she was working out doing the chest press at the gym and started to feel tightness in her shoulders about 9 months ago which turned into aching pain that never went away and it aches at night, pt also states she is here for her medication refills, missed her initial appt for pain management          History of Present Illness:       Patient is a 49 y.o. female presents with the above complaint. The symptoms began 9 monthsago and started without an injury but seem to correlate with vertical benchpress weight training. The patient describes a aching, stiffness pain that does not radiate and is typically more notable as nocturnal pain.  The symptoms are intermittent  and are show no change since the onset.       The symptoms do show a typical rotator cuff provacative pattern. The pain is  anterior about the shoulder.There are not associated mechanical symptoms localizing to the shoulder. The patient denies symptoms of instability about the shoulder.     Treatment to date has included nothing specific.      Pain levels 10.    The symptoms do not correlate with head and neck movement. The patient denies new onset weakness of the upper extremity.    The patient does not have history of prior shoulder trauma.     Workup has included: none    There is no history or autoimmune disease, inflammatory arthropathy or crystal arthropathy.          Past Medical History:        Past Medical History:   Diagnosis Date    Bacterial vaginosis     Bartholin's cyst     Bell's palsy     Fibroid     hx [place]    Necrotizing pancreatitis          Past Surgical History:   Procedure Laterality Date    BARTHOLIN GLAND CYST EXCISION       SECTION      DILATION AND CURETTAGE OF UTERUS      UTERINE FIBROID EMBOLIZATION           Present Medications:         Current Outpatient Medications   Medication Sig Dispense Refill

## 2024-10-08 ENCOUNTER — OFFICE VISIT (OUTPATIENT)
Dept: GYNECOLOGY | Age: 49
End: 2024-10-08
Payer: COMMERCIAL

## 2024-10-08 ENCOUNTER — TELEPHONE (OUTPATIENT)
Dept: ORTHOPEDIC SURGERY | Age: 49
End: 2024-10-08

## 2024-10-08 VITALS
DIASTOLIC BLOOD PRESSURE: 78 MMHG | RESPIRATION RATE: 17 BRPM | OXYGEN SATURATION: 98 % | HEART RATE: 92 BPM | SYSTOLIC BLOOD PRESSURE: 130 MMHG | BODY MASS INDEX: 25.8 KG/M2 | HEIGHT: 63 IN | WEIGHT: 145.6 LBS

## 2024-10-08 DIAGNOSIS — N89.8 VAGINAL DISCHARGE: ICD-10-CM

## 2024-10-08 DIAGNOSIS — R82.90 ABNORMAL URINE ODOR: ICD-10-CM

## 2024-10-08 DIAGNOSIS — Z01.419 WELL WOMAN EXAM: Primary | ICD-10-CM

## 2024-10-08 PROCEDURE — G8484 FLU IMMUNIZE NO ADMIN: HCPCS | Performed by: OBSTETRICS & GYNECOLOGY

## 2024-10-08 PROCEDURE — 99396 PREV VISIT EST AGE 40-64: CPT | Performed by: OBSTETRICS & GYNECOLOGY

## 2024-10-08 ASSESSMENT — ENCOUNTER SYMPTOMS
EYES NEGATIVE: 1
GASTROINTESTINAL NEGATIVE: 1
ALLERGIC/IMMUNOLOGIC NEGATIVE: 1
RESPIRATORY NEGATIVE: 1

## 2024-10-08 NOTE — TELEPHONE ENCOUNTER
Prescription Refill     Medication Name:  MELOXICAM AND TIZANIDINE  Pharmacy: [facility]   Patient Contact Number:  [phone]

## 2024-10-09 LAB
BACTERIA GENITAL QL WET PREP: NORMAL
CLUE CELLS SPEC QL WET PREP: NORMAL
EPI CELLS SPEC QL WET PREP: NORMAL
RBC SPEC QL WET PREP: NORMAL
SPECIMEN SOURCE FLD: NORMAL
T VAGINALIS GENITAL QL WET PREP: NORMAL
WBC SPEC QL WET PREP: NORMAL
YEAST GENITAL QL WET PREP: NORMAL

## 2024-10-09 RX ORDER — MELOXICAM 15 MG/1
15 TABLET ORAL DAILY
Qty: 30 TABLET | Refills: 1 | OUTPATIENT
Start: 2024-10-09

## 2024-10-09 NOTE — PROGRESS NOTES
Subjective   Patient ID: Paz Romo is a 49 y.o. female.    Patient is here for annual. Patient with some vaginal discharge/odor. Patient with? Urinary symptoms.     Gynecologic Exam        Review of Systems   Constitutional: Negative.    HENT: Negative.     Eyes: Negative.    Respiratory: Negative.     Cardiovascular: Negative.    Gastrointestinal: Negative.    Endocrine: Negative.    Genitourinary:  Positive for difficulty urinating and vaginal discharge.   Musculoskeletal: Negative.    Skin: Negative.    Allergic/Immunologic: Negative.    Neurological: Negative.    Hematological: Negative.    Psychiatric/Behavioral: Negative.       Date of Birth 1975  Past Medical History:   Diagnosis Date    Bacterial vaginosis     Bartholin's cyst     Bell's palsy     Fibroid     hx [place]    Necrotizing pancreatitis      Past Surgical History:   Procedure Laterality Date    BARTHOLIN GLAND CYST EXCISION       SECTION      DILATION AND CURETTAGE OF UTERUS      UTERINE FIBROID EMBOLIZATION       OB History    Para Term  AB Living   3 3 3     3   SAB IAB Ectopic Molar Multiple Live Births                    # Outcome Date GA Lbr Alexandr/2nd Weight Sex Type Anes PTL Lv   3 Term     M CS-LTranv      2 Term     M CS-LTranv      1 Term     M CS-LTranv        Social History     Socioeconomic History    Marital status: Single     Spouse name: Not on file    Number of children: Not on file    Years of education: Not on file    Highest education level: Not on file   Occupational History    Not on file   Tobacco Use    Smoking status: Former     Current packs/day: 0.00     Types: Cigarettes     Quit date: 2007     Years since quittin.3    Smokeless tobacco: Never   Vaping Use    Vaping status: Never Used   Substance and Sexual Activity    Alcohol use: No    Drug use: No    Sexual activity: Not Currently     Partners: Male   Other Topics Concern    Not on file   Social History Narrative    Not on

## 2024-10-09 NOTE — TELEPHONE ENCOUNTER
Rx Request: Tizanidine    Last Filled: 08/13/2024 for 13 days  Refill Due: 08/26/2024  Last OV: 08/13/2024  Follow Up:  None      Rx Request: Meloxicam    Last Filled: 08/13/2024  Refill Due: 09/12/2024  Last OV: 08/13/2024  Follow Up:  None

## 2024-10-12 LAB — BACTERIA GENITAL AEROBE CULT: NORMAL

## 2024-11-27 NOTE — TELEPHONE ENCOUNTER
Prescription Refill     Medication Name:  MELOXICAM & TIZANIDINE  Pharmacy: [id number] [place] [address] - P [phone] - F [phone]   Patient Contact Number:  [phone]

## 2024-12-10 ENCOUNTER — TELEPHONE (OUTPATIENT)
Dept: GYNECOLOGY | Age: 49
End: 2024-12-10

## 2024-12-10 DIAGNOSIS — R10.2 PELVIC CRAMPING: Primary | ICD-10-CM

## 2024-12-10 NOTE — TELEPHONE ENCOUNTER
Patient believes she has a bacterial infection    Symptoms:   Has a strong odor and cramping in pelvic area      Patient requesting antibiotics and a diflucan pill    [facility] #[id number] [place] [address] - P [phone] - F [phone] [[id number]]     Patient can be reached at [phone]

## 2024-12-10 NOTE — TELEPHONE ENCOUNTER
Called and spoke  to patient, informed her  that Dr cook would like for her to have a pelvic US done for pelvic cramping. Placed order in system gave patient phone number to call [facility] scheduling.     Also informed patient that Dr Cook would like for her to submit an e-visit to [employer].  Patient stated okay she will submit e- visit and call to schedule her US

## 2024-12-21 ENCOUNTER — HOSPITAL ENCOUNTER (EMERGENCY)
Age: 49
Discharge: HOME | End: 2024-12-21
Payer: COMMERCIAL

## 2024-12-21 VITALS
TEMPERATURE: 97.9 F | HEIGHT: 63 IN | WEIGHT: 145 LBS | RESPIRATION RATE: 16 BRPM | OXYGEN SATURATION: 96 % | SYSTOLIC BLOOD PRESSURE: 135 MMHG | DIASTOLIC BLOOD PRESSURE: 81 MMHG | BODY MASS INDEX: 25.69 KG/M2 | HEART RATE: 79 BPM

## 2024-12-21 DIAGNOSIS — R82.90 ABNORMAL URINE ODOR: ICD-10-CM

## 2024-12-21 DIAGNOSIS — R30.0 DYSURIA: Primary | ICD-10-CM

## 2024-12-21 LAB
BILIRUB UR QL STRIP.AUTO: NEGATIVE
CLARITY UR: CLEAR
COLOR UR: ABNORMAL
GLUCOSE UR STRIP.AUTO-MCNC: NEGATIVE MG/DL
HCG UR QL: NEGATIVE
HGB UR QL STRIP.AUTO: NEGATIVE
KETONES UR STRIP.AUTO-MCNC: NEGATIVE MG/DL
LEUKOCYTE ESTERASE UR QL STRIP.AUTO: NEGATIVE
NITRITE UR QL STRIP.AUTO: NEGATIVE
PH UR STRIP.AUTO: 5.5 [PH] (ref 5–8)
PROT UR STRIP.AUTO-MCNC: NEGATIVE MG/DL
SP GR UR STRIP.AUTO: 1.02 (ref 1–1.03)
UA COMPLETE W REFLEX CULTURE PNL UR: ABNORMAL
UA DIPSTICK W REFLEX MICRO PNL UR: ABNORMAL
URN SPEC COLLECT METH UR: ABNORMAL
UROBILINOGEN UR STRIP-ACNC: 0.2 E.U./DL

## 2024-12-21 PROCEDURE — 81003 URINALYSIS AUTO W/O SCOPE: CPT

## 2024-12-21 PROCEDURE — 99283 EMERGENCY DEPT VISIT LOW MDM: CPT

## 2024-12-21 PROCEDURE — 84703 CHORIONIC GONADOTROPIN ASSAY: CPT

## 2024-12-21 ASSESSMENT — PAIN - FUNCTIONAL ASSESSMENT: PAIN_FUNCTIONAL_ASSESSMENT: 0-10

## 2024-12-21 ASSESSMENT — ENCOUNTER SYMPTOMS
COUGH: 0
ABDOMINAL PAIN: 1
CHEST TIGHTNESS: 0
SHORTNESS OF BREATH: 0
NAUSEA: 0
VOMITING: 0
DIARRHEA: 0

## 2024-12-21 ASSESSMENT — PAIN SCALES - GENERAL: PAINLEVEL_OUTOF10: 8

## 2024-12-21 NOTE — ED PROVIDER NOTES
components:       Result Value    Color, UA DARK YELLOW (*)     All other components within normal limits   C.TRACHOMATIS N.GONORRHOEAE DNA   WET PREP, GENITAL   PREGNANCY, URINE   CBC WITH AUTO DIFFERENTIAL   COMPREHENSIVE METABOLIC PANEL   LIPASE       When ordered only abnormal lab results are displayed. All other labs were within normal range or not returned as of this dictation.    EKG: When ordered, EKG's are interpreted by the Emergency Department Physician in the absence of a cardiologist.  Please see their note for interpretation of EKG.    RADIOLOGY:   Non-plain film images such as CT, Ultrasound and MRI are read by the radiologist. Plain radiographic images are visualized and preliminarily interpreted by the ED Provider with the below findings:        Interpretation per the Radiologist below, if available at the time of this note:    No orders to display     No results found.    No results found.    PROCEDURES   Unless otherwise noted below, none     Procedures    CRITICAL CARE TIME (.cctime)       PAST MEDICAL HISTORY      has a past medical history of Bacterial vaginosis, Bartholin's cyst, Bell's palsy, Fibroid, and Necrotizing pancreatitis.     Chronic Conditions affecting Care: None    EMERGENCY DEPARTMENT COURSE and DIFFERENTIAL DIAGNOSIS/MDM:   Vitals:    Vitals:    12/21/24 1207   BP: 135/81   Pulse: 79   Resp: 16   Temp: 97.9 °F (36.6 °C)   TempSrc: Oral   SpO2: 96%   Weight: 65.8 kg (145 lb)   Height: 1.6 m (5' 3\")       Patient was given the following medications:  Medications - No data to display          Is this patient to be included in the SEP-1 Core Measure due to severe sepsis or septic shock?   No   Exclusion criteria - the patient is NOT to be included for SEP-1 Core Measure due to:  2+ SIRS criteria are not met    CONSULTS: (Who and What was discussed)  None  Discussion with Other Profesionals : None    Social Determinants : None    Records Reviewed : None    CC/HPI Summary, DDx, ED  Course, and Reassessment: Patient with a history of bacterial vaginosis presented to the emergency department today complaining of dysuria as well as urinary urgency and frequency and urinary odor she has had for the last 2 weeks.  She does report mild suprapubic abdominal discomfort.  She denies nausea, vomiting, diarrhea or constipation.  She denies vaginal pain, discharge or bleeding.  She denies fever, chills or flank pain.    Patient has very minimal tenderness on palpation of the suprapubic region of her abdomen.  There is no abdominal distention, rigidity or guarding.  Her abdomen is soft with bowel sounds present in all 4 quadrants.    Urine analysis with no signs of infection.  Qualitative hCG is negative.      Disposition Considerations (include 1 Tests not done, Shared Decision Making, Pt Expectation of Test or Tx.): I had a lengthy discussion with the patient regarding testing completed in the emergency department today as well as the results.  Advised patient with suprapubic abdominal pain and urinary symptoms with a clean urine sample, I would recommend performing some blood test.  Patient declines stating she would like to just leave and follow-up with her PCP.  I do not feel this is unreasonable.  She is given strict return precautions    Appropriate for outpatient management        I am the Primary Clinician of Record.    FINAL IMPRESSION      1. Dysuria    2. Abnormal urine odor          DISPOSITION/PLAN     DISPOSITION Decision To Discharge 12/21/2024 12:59:31 PM   DISPOSITION CONDITION Stable           PATIENT REFERRED TO:  Nila Zafar MD  [address] [facility]  [id number] [address]  [phone]    Schedule an appointment as soon as possible for a visit         DISCHARGE MEDICATIONS:  Discharge Medication List as of 12/21/2024  1:00 PM          DISCONTINUED MEDICATIONS:  Discharge Medication List as of 12/21/2024  1:00 PM        STOP taking these medications       RESTASIS 0.05 %

## 2025-01-28 ENCOUNTER — OFFICE VISIT (OUTPATIENT)
Dept: ORTHOPEDIC SURGERY | Age: 50
End: 2025-01-28

## 2025-01-28 VITALS — HEIGHT: 63 IN | WEIGHT: 145 LBS | BODY MASS INDEX: 25.69 KG/M2

## 2025-01-28 DIAGNOSIS — M47.816 LUMBAR SPONDYLOSIS: ICD-10-CM

## 2025-01-28 DIAGNOSIS — M51.362 DEGENERATION OF INTERVERTEBRAL DISC OF LUMBAR REGION WITH DISCOGENIC BACK PAIN AND LOWER EXTREMITY PAIN: ICD-10-CM

## 2025-01-28 DIAGNOSIS — M51.26 HERNIATION OF INTERVERTEBRAL DISC OF LUMBAR SPINE: Primary | ICD-10-CM

## 2025-01-28 RX ORDER — TRAMADOL HYDROCHLORIDE 50 MG/1
50 TABLET ORAL
Qty: 15 TABLET | Refills: 0 | Status: SHIPPED | OUTPATIENT
Start: 2025-01-28 | End: 2025-02-02

## 2025-01-28 RX ORDER — MELOXICAM 15 MG/1
15 TABLET ORAL DAILY PRN
Qty: 30 TABLET | Refills: 2 | Status: SHIPPED | OUTPATIENT
Start: 2025-01-28

## 2025-01-28 NOTE — PROGRESS NOTES
Chief Complaint:   Chief Complaint   Patient presents with    Lower Back Pain     LUMBAR SPINE-STILL HAVING PAIN, WOULD LIKE A REFILL ON ZANAFLEX          History of Present Illness:       Patient is a 49 y.o. female returns follow up for the above complaint. The patient was last seen approximately 1 yearsago related to her low back condition and lost to follow-up. The symptoms remain problematic since the last visit. The patient has had no further testing for the problem.      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     She elected not to attend supervised PT in the interim since her last visit     Back:Left leg pain 20:80. Pain in back and leg is aching- soreness in quality.  Leg pain follows a L3/L4 dermatomal distribution radiating below the knee    Pain levels: 4-9 .     The patient denies new onset or progressive weakness of the lower extremities.  The patient denies new onset bowel or bladder dysfunction.    She is requesting a refill of meloxicam and Zanaflex and Ultram         Past Medical History:        Past Medical History:   Diagnosis Date    Bacterial vaginosis     Bartholin's cyst     Bell's palsy     Fibroid     hx [place]    Necrotizing pancreatitis         Present Medications:         Current Outpatient Medications   Medication Sig Dispense Refill    meloxicam (MOBIC) 15 MG tablet Take 1 tablet by mouth daily as needed for Pain 30 tablet 2    tiZANidine (ZANAFLEX) 4 MG tablet Take 1 tablet by mouth 3 times daily as needed (Muscle tightness/spasm) 50 tablet 1    traMADol (ULTRAM) 50 MG tablet Take 1 tablet by mouth every 6-8 hours as needed for Pain (Moderate to severe pain) for up to 5 days. Max Daily Amount: 200 mg 15 tablet 0    lidocaine (LIDODERM) 5 % Apply as needed for pain 1-3

## 2025-02-19 ENCOUNTER — TELEPHONE (OUTPATIENT)
Dept: GYNECOLOGY | Age: 50
End: 2025-02-19

## 2025-02-19 NOTE — TELEPHONE ENCOUNTER
Patient has not done her pelvic US for the pelvic cramping she stated she is experiencing in previous messages.

## 2025-02-19 NOTE — TELEPHONE ENCOUNTER
Patient called in. Was in route to her 9:00 appt but is having Car issues. Now she's stranded waiting for help. Would like to reschedule. Please advise on scheduling.     Patient can be reached at [phone]

## 2025-03-26 NOTE — TELEPHONE ENCOUNTER
Rx Request: Meloxicam    Last Filled: 01/28/2025 with 2 refills  Refill Due: 04/27/2025  Last OV: 01/28/2025  Follow Up:  None      Rx Request: Tizanidine    Last Filled: 01/28/2025 for 16 days with 1 refill  Refill Due: 03/01/2025  Last OV: 01/28/2025  Follow Up:  None

## 2025-03-28 RX ORDER — MELOXICAM 15 MG/1
15 TABLET ORAL DAILY PRN
Qty: 30 TABLET | Refills: 1 | Status: SHIPPED | OUTPATIENT
Start: 2025-03-28

## 2025-04-28 ENCOUNTER — OFFICE VISIT (OUTPATIENT)
Dept: ORTHOPEDIC SURGERY | Age: 50
End: 2025-04-28
Payer: COMMERCIAL

## 2025-04-28 VITALS — BODY MASS INDEX: 25.7 KG/M2 | WEIGHT: 145.06 LBS | HEIGHT: 63 IN

## 2025-04-28 DIAGNOSIS — M51.362 DEGENERATION OF INTERVERTEBRAL DISC OF LUMBAR REGION WITH DISCOGENIC BACK PAIN AND LOWER EXTREMITY PAIN: ICD-10-CM

## 2025-04-28 DIAGNOSIS — M51.26 HERNIATION OF INTERVERTEBRAL DISC OF LUMBAR SPINE: Primary | ICD-10-CM

## 2025-04-28 DIAGNOSIS — M47.816 LUMBAR SPONDYLOSIS: ICD-10-CM

## 2025-04-28 PROCEDURE — 1036F TOBACCO NON-USER: CPT | Performed by: INTERNAL MEDICINE

## 2025-04-28 PROCEDURE — G8428 CUR MEDS NOT DOCUMENT: HCPCS | Performed by: INTERNAL MEDICINE

## 2025-04-28 PROCEDURE — 3017F COLORECTAL CA SCREEN DOC REV: CPT | Performed by: INTERNAL MEDICINE

## 2025-04-28 PROCEDURE — G8419 CALC BMI OUT NRM PARAM NOF/U: HCPCS | Performed by: INTERNAL MEDICINE

## 2025-04-28 PROCEDURE — 99214 OFFICE O/P EST MOD 30 MIN: CPT | Performed by: INTERNAL MEDICINE

## 2025-04-28 RX ORDER — METHYLPREDNISOLONE 4 MG/1
TABLET ORAL
Qty: 1 KIT | Refills: 0 | Status: SHIPPED | OUTPATIENT
Start: 2025-04-28

## 2025-04-28 RX ORDER — TRAMADOL HYDROCHLORIDE 50 MG/1
50 TABLET ORAL EVERY 6 HOURS PRN
Qty: 20 TABLET | Refills: 0 | Status: SHIPPED | OUTPATIENT
Start: 2025-04-28 | End: 2025-05-03

## 2025-04-28 RX ORDER — MELOXICAM 15 MG/1
15 TABLET ORAL DAILY PRN
Qty: 30 TABLET | Refills: 2 | Status: SHIPPED | OUTPATIENT
Start: 2025-04-28

## 2025-04-28 NOTE — PROGRESS NOTES
Chief Complaint:   Chief Complaint   Patient presents with    Lower Back Pain     pt states that LBP is typically manageable unless she has a heavy housework day          History of Present Illness:       Patient is a 50 y.o. female returns follow up for the above complaint. The patient was last seen approximately 3 monthsago. The symptoms show no change since the last visit-cycling good days and bad days. The patient has had no further testing for the problem.      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      She elected against spine intervention injection as a treatment option at the time of her last visit.    Back:Left thigh pain 50:50. Pain in back and thigh is aching or pins-and-needles and pressure in quality.  Lower limb symptoms following L3-L4 dermatomal pattern without radiation below the knee.    Pain levels: 1-10.     The patient has not started supervised PT but continues with HEP.    The patient denies new onset or progressive weakness of the lower extremities.  The patient denies new onset bowel or bladder dysfunction.    She continues on medical pain management as per previous inclusive of NSAID- , analgesic-only sparing use of Ultram, muscle relaxant-     Past Medical History:        Past Medical History:   Diagnosis Date    Bacterial vaginosis     Bartholin's cyst     Bell's palsy     Fibroid     hx [place]    Necrotizing pancreatitis         Present Medications:         Current Outpatient Medications   Medication Sig Dispense Refill    tiZANidine (ZANAFLEX) 4 MG tablet TAKE 1 TABLET BY MOUTH THREE TIMES DAILY AS NEEDED FOR MUSCLE SPASMS OR TIGHTNESS 50 tablet 0    meloxicam (MOBIC) 15 MG tablet TAKE 1 TABLET BY MOUTH DAILY AS NEEDED FOR PAIN 30 tablet 1    lidocaine (LIDODERM) 5 %

## 2025-06-10 NOTE — TELEPHONE ENCOUNTER
Rx Request: Tizanidine    Last Filled: 04/28/2025 for 16 days with 1 refill  Refill Due: 05/30/2025  Last OV: 04/28/2025  Follow Up:  None